# Patient Record
Sex: FEMALE | Race: WHITE | NOT HISPANIC OR LATINO | Employment: OTHER | URBAN - METROPOLITAN AREA
[De-identification: names, ages, dates, MRNs, and addresses within clinical notes are randomized per-mention and may not be internally consistent; named-entity substitution may affect disease eponyms.]

---

## 2017-03-01 ENCOUNTER — OFFICE VISIT (OUTPATIENT)
Dept: CARDIOLOGY | Facility: MEDICAL CENTER | Age: 57
End: 2017-03-01
Payer: COMMERCIAL

## 2017-03-01 VITALS
OXYGEN SATURATION: 93 % | BODY MASS INDEX: 31.07 KG/M2 | WEIGHT: 217 LBS | HEIGHT: 70 IN | HEART RATE: 72 BPM | SYSTOLIC BLOOD PRESSURE: 128 MMHG | DIASTOLIC BLOOD PRESSURE: 78 MMHG

## 2017-03-01 DIAGNOSIS — I47.10 SVT (SUPRAVENTRICULAR TACHYCARDIA): ICD-10-CM

## 2017-03-01 DIAGNOSIS — Z86.79 S/P RADIOFREQUENCY ABLATION OPERATION FOR ARRHYTHMIA: ICD-10-CM

## 2017-03-01 DIAGNOSIS — E11.9 TYPE 2 DIABETES MELLITUS WITHOUT COMPLICATION, WITHOUT LONG-TERM CURRENT USE OF INSULIN (HCC): ICD-10-CM

## 2017-03-01 DIAGNOSIS — Z98.890 S/P RADIOFREQUENCY ABLATION OPERATION FOR ARRHYTHMIA: ICD-10-CM

## 2017-03-01 DIAGNOSIS — J43.1 PANLOBULAR EMPHYSEMA (HCC): ICD-10-CM

## 2017-03-01 LAB — EKG IMPRESSION: NORMAL

## 2017-03-01 PROCEDURE — 93000 ELECTROCARDIOGRAM COMPLETE: CPT | Performed by: INTERNAL MEDICINE

## 2017-03-01 PROCEDURE — 99214 OFFICE O/P EST MOD 30 MIN: CPT | Performed by: INTERNAL MEDICINE

## 2017-03-01 NOTE — PROGRESS NOTES
"Subjective:   Lc Burns is a 56 y.o. female who presents today with h/o RFA of arrhythmia by Dr Gee for a tachycardia ( [facility] AT) in Oct. Arrhythmias have been good. Had an episode of bradycardia Monday after procedure. Event recorder put on by Dr Etienne. Minimal arrhythmias. Has had atypical chest pain since procedure. [facility] forth [facility] cardiology. Has O2 dependent COPD.    Past Medical History   Diagnosis Date   • Cancer (CMS-HCC) 1980s     Hodgkins lymphoma   • Bronchitis    • ASTHMA 2008     recurring   • Pneumonia    • Indigestion current     tx with OTC rolaids   • Arthritis current     knees, hands, elbows   • MI (myocardial infarction) (CMS-HCC)    • Sepsis (CMS-HCC) 2/25/2015   • COPD with acute exacerbation (CMS-HCC) 2/25/2015   • Persistent atrial fibrillation (CMS-HCC) 2/9/2016     History reviewed. No pertinent past surgical history.  Family History   Problem Relation Age of Onset   • Heart Disease Mother    • Other Mother 72     pacemaker   • Lung Disease Father      severe asthma   • Alcohol/Drug Maternal Grandfather    • Lung Disease Paternal Grandmother      History   Smoking status   • Former Smoker   • Quit date: 03/21/2013   Smokeless tobacco   • Not on file     Comment: quit 4 days ago, smoked less than 1ppd     Allergies   Allergen Reactions   • Cephalexin Anaphylaxis     Rxn date: 2013.   • Clindamycin Anaphylaxis     Rxn date: 2013.   • Codeine Hives, Rash and Itching     Rxn date: \"As a child.\"   • Penicillins Hives, Rash and Itching     Rxn date: \"As a child.\"     Outpatient Encounter Prescriptions as of 3/1/2017   Medication Sig Dispense Refill   • atenolol (TENORMIN) 50 MG Tab      • triamcinolone (KENALOG) 0.025 % ointment      • aspirin EC (ECOTRIN) 81 MG Tablet Delayed Response Take 81 mg by mouth every day.     • acetaminophen (TYLENOL) 500 MG Tab Take 1,000 mg by mouth every 6 hours as needed for Moderate Pain.     • metformin " "(GLUCOPHAGE) 1000 MG tablet Take 1 Tab by mouth 2 times a day, with meals. 60 Tab 0   • simvastatin (ZOCOR) 20 MG TABS Take 1 Tab by mouth every evening. 30 Tab 0   • albuterol (VENTOLIN OR PROVENTIL) 108 (90 BASE) MCG/ACT AERS inhalation aerosol Inhale 1-2 Puffs by mouth every four hours as needed for Shortness of Breath. Indications: SOB     • aspirin (ASA) 81 MG Chew Tab chewable tablet Take 81 mg by mouth every day.     • ciprofloxacin (CIPRO) 500 MG Tab 1 TAB TWICE A DAY X 14 DAYS. 28 Tab 0   • triamcinolone acetonide (KENALOG) 0.1 % Ointment        No facility-administered encounter medications on file as of 3/1/2017.     ROS     Objective:   /78 mmHg  Pulse 72  Ht 1.778 m (5' 10\")  Wt 98.431 kg (217 lb)  BMI 31.14 kg/m2  SpO2 93%  LMP 01/01/2006    Physical Exam   Constitutional: She is oriented to person, place, and time. She appears well-developed and well-nourished. No distress.   HENT:   Mouth/Throat: Oropharynx is clear and moist.   Eyes: Conjunctivae and EOM are normal.   Neck: Neck supple. No JVD present. No thyroid mass present.   Cardiovascular: Normal rate, regular rhythm, S1 normal, S2 normal and normal pulses.  PMI is not displaced.  Exam reveals no gallop.    No murmur heard.  Pulses:       Carotid pulses are 2+ on the right side, and 2+ on the left side.       Radial pulses are 2+ on the right side, and 2+ on the left side.        Femoral pulses are 2+ on the right side, and 2+ on the left side.       Dorsalis pedis pulses are 2+ on the right side, and 2+ on the left side.   No peripheral edema.   Pulmonary/Chest: Effort normal and breath sounds normal.   Abdominal: Soft. Normal appearance. She exhibits no abdominal bruit and no mass. There is no hepatosplenomegaly. There is no tenderness.   Musculoskeletal: Normal range of motion. She exhibits no edema.        Lumbar back: She exhibits no tenderness and no spasm.   Neurological: She is alert and oriented to person, place, and time. " She has normal strength.   Skin: Skin is warm and dry. No rash noted. No cyanosis. Nails show no clubbing.   Psychiatric: She has a normal mood and affect.       Assessment:     1. Panlobular emphysema (CMS-[facility])     2. SVT (supraventricular tachycardia) (CMS-[facility])     3. Type 2 diabetes mellitus without complication, without long-term current use of insulin (CMS-HCC)         Medical Decision Making:  Today's Assessment / Status / Plan:     1. Atypical chest pain since procedure. Will get old records for review including RFA. Have encouraged patient to continue to followup with [facility].  2. SVT appears improved or eliminated post RFA. Get old report.  3. Will call her post reviewing records.

## 2017-03-01 NOTE — Clinical Note
"     [facility]   [address]  [place] [address]  Phone: [phone]  Fax: [phone]              Lc Burns  1960    Encounter Date: 3/1/2017    Howard Lou M.D.          PROGRESS NOTE:  Subjective:   Lc Burns is a 56 y.o. female who presents today with h/o RFA of arrhythmia by Dr Gee for a tachycardia ( [facility] AT) in Oct. Arrhythmias have been good. Had an episode of bradycardia Monday after procedure. Event recorder put on by Dr Etienne. Minimal arrhythmias. Has had atypical chest pain since procedure. Has been going [facility] and forth [facility]. Has O2 dependent COPD.    Past Medical History   Diagnosis Date   • Cancer (CMS-HCC) 1980s     Hodgkins lymphoma   • Bronchitis    • ASTHMA 2008     recurring   • Pneumonia    • Indigestion current     tx with OTC rolaids   • Arthritis current     knees, hands, elbows   • MI (myocardial infarction) (CMS-HCC)    • Sepsis (CMS-HCC) 2/25/2015   • COPD with acute exacerbation (CMS-HCC) 2/25/2015   • Persistent atrial fibrillation (CMS-HCC) 2/9/2016     History reviewed. No pertinent past surgical history.  Family History   Problem Relation Age of Onset   • Heart Disease Mother    • Other Mother 72     pacemaker   • Lung Disease Father      severe asthma   • Alcohol/Drug Maternal Grandfather    • Lung Disease Paternal Grandmother      History   Smoking status   • Former Smoker   • Quit date: 03/21/2013   Smokeless tobacco   • Not on file     Comment: quit 4 days ago, smoked less than 1ppd     Allergies   Allergen Reactions   • Cephalexin Anaphylaxis     Rxn date: 2013.   • Clindamycin Anaphylaxis     Rxn date: 2013.   • Codeine Hives, Rash and Itching     Rxn date: \"As a child.\"   • Penicillins Hives, Rash and Itching     Rxn date: \"As a child.\"     Outpatient Encounter Prescriptions as of 3/1/2017   Medication Sig Dispense Refill   • atenolol (TENORMIN) 50 MG Tab      " "  • triamcinolone (KENALOG) 0.025 % ointment      • aspirin EC (ECOTRIN) 81 MG Tablet Delayed Response Take 81 mg by mouth every day.     • acetaminophen (TYLENOL) 500 MG Tab Take 1,000 mg by mouth every 6 hours as needed for Moderate Pain.     • metformin (GLUCOPHAGE) 1000 MG tablet Take 1 Tab by mouth 2 times a day, with meals. 60 Tab 0   • simvastatin (ZOCOR) 20 MG TABS Take 1 Tab by mouth every evening. 30 Tab 0   • albuterol (VENTOLIN OR PROVENTIL) 108 (90 BASE) MCG/ACT AERS inhalation aerosol Inhale 1-2 Puffs by mouth every four hours as needed for Shortness of Breath. Indications: SOB     • aspirin (ASA) 81 MG Chew Tab chewable tablet Take 81 mg by mouth every day.     • ciprofloxacin (CIPRO) 500 MG Tab 1 TAB TWICE A DAY X 14 DAYS. 28 Tab 0   • triamcinolone acetonide (KENALOG) 0.1 % Ointment        No facility-administered encounter medications on file as of 3/1/2017.     ROS     Objective:   /78 mmHg  Pulse 72  Ht 1.778 m (5' 10\")  Wt 98.431 kg (217 lb)  BMI 31.14 kg/m2  SpO2 93%  LMP 01/01/2006    Physical Exam   Constitutional: She is oriented to person, place, and time. She appears well-developed and well-nourished. No distress.   HENT:   Mouth/Throat: Oropharynx is clear and moist.   Eyes: Conjunctivae and EOM are normal.   Neck: Neck supple. No JVD present. No thyroid mass present.   Cardiovascular: Normal rate, regular rhythm, S1 normal, S2 normal and normal pulses.  PMI is not displaced.  Exam reveals no gallop.    No murmur heard.  Pulses:       Carotid pulses are 2+ on the right side, and 2+ on the left side.       Radial pulses are 2+ on the right side, and 2+ on the left side.        Femoral pulses are 2+ on the right side, and 2+ on the left side.       Dorsalis pedis pulses are 2+ on the right side, and 2+ on the left side.   No peripheral edema.   Pulmonary/Chest: Effort normal and breath sounds normal.   Abdominal: Soft. Normal appearance. She exhibits no abdominal bruit and no " mass. There is no hepatosplenomegaly. There is no tenderness.   Musculoskeletal: Normal range of motion. She exhibits no edema.        Lumbar back: She exhibits no tenderness and no spasm.   Neurological: She is alert and oriented to person, place, and time. She has normal strength.   Skin: Skin is warm and dry. No rash noted. No cyanosis. Nails show no clubbing.   Psychiatric: She has a normal mood and affect.       Assessment:     1. Panlobular emphysema (CMS-HCC)     2. SVT (supraventricular tachycardia) (CMS-[facility])     3. Type 2 diabetes mellitus without complication, without long-term current use of insulin (CMS-[facility])         Medical Decision Making:  Today's Assessment / Status / Plan:     1. Atypical chest pain since procedure. Will get old records for review including RFA. Have encouraged patient to continue to followup with [facility].  2. SVT appears improved or eliminated post RFA. Get old report.  3. Will call her post reviewing records.      Gaetano Guardado M.D.  [address] [facility] [address]  VIA Facsimile: [phone]     Brayden Etienne M.D.  [address] [address]  VIA Facsimile: [phone]

## 2017-03-01 NOTE — MR AVS SNAPSHOT
"        Lc Burns   3/1/2017 8:40 AM   Office Visit   MRN: [id number]    Department:  [facility]   Dept Phone:  [phone]    Description:  Female : 1960   Provider:  Howard Lou M.D.           Reason for Visit     Follow-Up           Allergies as of 3/1/2017     Allergen Noted Reactions    Cephalexin 2015   Anaphylaxis    Rxn date: .    Clindamycin 2015   Anaphylaxis    Rxn date: .    Codeine 2015   Hives, Rash, Itching    Rxn date: \"As a child.\"    Penicillins 2015   Hives, Rash, Itching    Rxn date: \"As a child.\"      You were diagnosed with     Panlobular emphysema (CMS-HCC)   [115113]       SVT (supraventricular tachycardia) (CMS-HCC)   [887585]       Type 2 diabetes mellitus without complication, without long-term current use of insulin (CMS-HCC)   [9845443]       S/P radiofrequency ablation operation for arrhythmia   [987134]         Vital Signs     Blood Pressure Pulse Height Weight Body Mass Index Oxygen Saturation    128/78 mmHg 72 1.778 m (5' 10\") 98.431 kg (217 lb) 31.14 kg/m2 93%    Last Menstrual Period Smoking Status                2006 Former Smoker          Basic Information     Date Of Birth Sex Race Ethnicity Preferred Language    1960 Female White Non- English      Problem List              ICD-10-CM Priority Class Noted - Resolved    Panlobular emphysema (CMS-HCC) J43.1   2016 - Present    Overweight E66.3   2016 - Present    Sleep apnea G47.30   2016 - Present    Type 2 diabetes mellitus without complication (CMS-HCC) E11.9   2016 - Present    Coronary artery disease involving native coronary artery I25.10   2016 - Present    Anxiety F41.9   3/21/2016 - Present    Atypical chest pain R07.89   3/21/2016 - Present    Coronary atherosclerosis of native coronary artery I25.10   3/21/2016 - Present    SVT (supraventricular tachycardia) (CMS-HCC) I47.1   2016 - Present    S/P radiofrequency ablation " operation for arrhythmia Z98.890, Z86.79   3/1/2017 - Present      Health Maintenance        Date Due Completion Dates    IMM HEP B VACCINE (1 of 3 - Primary Series) 1960 ---    DIABETES MONOFILAMENT / LE EXAM 4/2/1959 ---    RETINAL SCREENING 10/2/1976 ---    URINE ACR / MICROALBUMIN 10/2/1976 ---    IMM DTaP/Tdap/Td Vaccine (1 - Tdap) 10/2/1977 ---    IMM PNEUMOCOCCAL 19-64 (ADULT) MEDIUM RISK SERIES (1 of 1 - PPSV23) 10/2/1977 ---    PAP SMEAR 10/2/1979 ---    COLONOSCOPY 10/2/2008 ---    FASTING LIPID PROFILE 8/25/2010 8/25/2009    A1C SCREENING 8/25/2015 2/25/2015    IMM INFLUENZA (1) 9/1/2016 ---    MAMMOGRAM 12/15/2016 12/15/2015, 12/9/2015    SERUM CREATININE 3/18/2017 3/18/2016, 11/25/2015, 8/19/2015, 2/26/2015, 2/25/2015, 8/26/2009, 8/25/2009, 8/24/2009            Results       Current Immunizations     No immunizations on file.      Below and/or attached are the medications your provider expects you to take. Review all of your home medications and newly ordered medications with your provider and/or pharmacist. Follow medication instructions as directed by your provider and/or pharmacist. Please keep your medication list with you and share with your provider. Update the information when medications are discontinued, doses are changed, or new medications (including over-the-counter products) are added; and carry medication information at all times in the event of emergency situations     Allergies:  CEPHALEXIN - Anaphylaxis     CLINDAMYCIN - Anaphylaxis     CODEINE - Hives,Rash,Itching     PENICILLINS - Hives,Rash,Itching               Medications  Valid as of: March 01, 2017 -  9:18 AM    Generic Name Brand Name Tablet Size Instructions for use    Acetaminophen (Tab) TYLENOL 500 MG Take 1,000 mg by mouth every 6 hours as needed for Moderate Pain.        Albuterol Sulfate (Aero Soln) albuterol 108 (90 BASE) MCG/ACT Inhale 1-2 Puffs by mouth every four hours as needed for Shortness of Breath.  Indications: SOB        Aspirin (Tablet Delayed Response) ECOTRIN 81 MG Take 81 mg by mouth every day.        Aspirin (Chew Tab) ASA 81 MG Take 81 mg by mouth every day.        Atenolol (Tab) TENORMIN 50 MG         Ciprofloxacin HCl (Tab) CIPRO 500 MG 1 TAB TWICE A DAY X 14 DAYS.        MetFORMIN HCl (Tab) GLUCOPHAGE 1000 MG Take 1 Tab by mouth 2 times a day, with meals.        Simvastatin (Tab) ZOCOR 20 MG Take 1 Tab by mouth every evening.        Triamcinolone Acetonide (Ointment) KENALOG 0.1 %         Triamcinolone Acetonide (Ointment) KENALOG 0.025 %         .                 Medicines prescribed today were sent to:     [id number] - [place], [place] - [address] [place] [address]    Phone: [phone] Fax: [phone]    Open 24 Hours?: No      Medication refill instructions:       If your prescription bottle indicates you have medication refills left, it is not necessary to call your provider’s office. Please contact your pharmacy and they will refill your medication.    If your prescription bottle indicates you do not have any refills left, you may request refills at any time through one of the following ways: The online [employer] system (except Urgent Care), by calling your provider’s office, or by asking your pharmacy to contact your provider’s office with a refill request. Medication refills are processed only during regular business hours and may not be available until the next business day. Your provider may request additional information or to have a follow-up visit with you prior to refilling your medication.   *Please Note: Medication refills are assigned a new Rx number when refilled electronically. Your pharmacy may indicate that no refills were authorized even though a new prescription for the same medication is available at the pharmacy. Please request the medicine by name with the pharmacy before contacting your provider for a refill.           [employer] Access Code:  [email or web address]  Expires: 3/6/2017  9:29 AM    [employer]Del[employer]Mx  A secure, online tool to manage your health information     [employer]’s SinDelantal.Mx® is a secure, online tool that connects you to your personalized health information from the privacy of your home -- day or night - making it very easy for you to manage your healthcare. Once the activation process is completed, you can even access your medical information using the SinDelantal.Mx whitney, which is available for free in the [employer] store or Google Play store.     [email or web address].Mx provides the following levels of access (as shown below):   My Chart Features   [facility] Primary Care Doctor [facility]  Specialists [facility]  Urgent  Care [facility]  Primary Care  Doctor   Email your healthcare team securely and privately 24/7 X X X    Manage appointments: schedule your next appointment; view details of past/upcoming appointments X      Request prescription refills. X      View recent personal medical records, including lab and immunizations X X X X   View health record, including health history, allergies, medications X X X X   Read reports about your outpatient visits, procedures, consult and ER notes X X X X   See your discharge summary, which is a recap of your hospital and/or ER visit that includes your diagnosis, lab results, and care plan. X X       How to register for [email or web address]:  1. Go to  [email or web address].  2. Click on the Sign Up Now box, which takes you to the New Member Sign Up page. You will need to provide the following information:  a. Enter your [email or web address][employer]Mx Access Code exactly as it appears at the top of this page. (You will not need to use this code after you’ve completed the sign-up process. If you do not sign up before the expiration date, you must request a new code.)   b. Enter your date of birth.   c. Enter your home email address.   d. Click Submit, and follow the next screen’s instructions.  3. Create a SinDelantal[employer]Mx ID. This will be your [email or web address][employer]Mx login ID and cannot be  changed, so think of one that is secure and easy to remember.  4. Create a [employer] password. You can change your password at any time.  5. Enter your Password Reset Question and Answer. This can be used at a later time if you forget your password.   6. Enter your e-mail address. This allows you to receive e-mail notifications when new information is available in [employer].  7. Click Sign Up. You can now view your health information.    For assistance activating your [employer] account, call [phone]

## 2017-08-05 ENCOUNTER — HOSPITAL ENCOUNTER (OUTPATIENT)
Facility: MEDICAL CENTER | Age: 57
End: 2017-08-06
Attending: EMERGENCY MEDICINE | Admitting: INTERNAL MEDICINE
Payer: COMMERCIAL

## 2017-08-05 ENCOUNTER — APPOINTMENT (OUTPATIENT)
Dept: RADIOLOGY | Facility: MEDICAL CENTER | Age: 57
End: 2017-08-05
Attending: EMERGENCY MEDICINE
Payer: COMMERCIAL

## 2017-08-05 ENCOUNTER — RESOLUTE PROFESSIONAL BILLING HOSPITAL PROF FEE (OUTPATIENT)
Dept: HOSPITALIST | Facility: MEDICAL CENTER | Age: 57
End: 2017-08-05
Payer: COMMERCIAL

## 2017-08-05 DIAGNOSIS — R07.9 CHEST PAIN, UNSPECIFIED TYPE: ICD-10-CM

## 2017-08-05 LAB
ALBUMIN SERPL BCP-MCNC: 4.2 G/DL (ref 3.2–4.9)
ALBUMIN/GLOB SERPL: 1.3 G/DL
ALP SERPL-CCNC: 75 U/L (ref 30–99)
ALT SERPL-CCNC: 26 U/L (ref 2–50)
ANION GAP SERPL CALC-SCNC: 8 MMOL/L (ref 0–11.9)
AST SERPL-CCNC: 18 U/L (ref 12–45)
BILIRUB SERPL-MCNC: 0.5 MG/DL (ref 0.1–1.5)
BUN SERPL-MCNC: 14 MG/DL (ref 8–22)
CALCIUM SERPL-MCNC: 9.6 MG/DL (ref 8.5–10.5)
CHLORIDE SERPL-SCNC: 104 MMOL/L (ref 96–112)
CO2 SERPL-SCNC: 26 MMOL/L (ref 20–33)
CREAT SERPL-MCNC: 0.72 MG/DL (ref 0.5–1.4)
EKG IMPRESSION: NORMAL
ERYTHROCYTE [DISTWIDTH] IN BLOOD BY AUTOMATED COUNT: 40.6 FL (ref 35.9–50)
GFR SERPL CREATININE-BSD FRML MDRD: >60 ML/MIN/1.73 M 2
GLOBULIN SER CALC-MCNC: 3.3 G/DL (ref 1.9–3.5)
GLUCOSE BLD-MCNC: 149 MG/DL (ref 65–99)
GLUCOSE BLD-MCNC: 184 MG/DL (ref 65–99)
GLUCOSE SERPL-MCNC: 205 MG/DL (ref 65–99)
HCT VFR BLD AUTO: 40.5 % (ref 37–47)
HGB BLD-MCNC: 13.6 G/DL (ref 12–16)
MCH RBC QN AUTO: 30.6 PG (ref 27–33)
MCHC RBC AUTO-ENTMCNC: 33.6 G/DL (ref 33.6–35)
MCV RBC AUTO: 91.2 FL (ref 81.4–97.8)
PLATELET # BLD AUTO: 184 K/UL (ref 164–446)
PMV BLD AUTO: 10.1 FL (ref 9–12.9)
POTASSIUM SERPL-SCNC: 4 MMOL/L (ref 3.6–5.5)
PROT SERPL-MCNC: 7.5 G/DL (ref 6–8.2)
RBC # BLD AUTO: 4.44 M/UL (ref 4.2–5.4)
SODIUM SERPL-SCNC: 138 MMOL/L (ref 135–145)
TROPONIN I SERPL-MCNC: <0.01 NG/ML (ref 0–0.04)
WBC # BLD AUTO: 7.7 K/UL (ref 4.8–10.8)

## 2017-08-05 PROCEDURE — A9270 NON-COVERED ITEM OR SERVICE: HCPCS | Performed by: INTERNAL MEDICINE

## 2017-08-05 PROCEDURE — 99285 EMERGENCY DEPT VISIT HI MDM: CPT

## 2017-08-05 PROCEDURE — 93005 ELECTROCARDIOGRAM TRACING: CPT

## 2017-08-05 PROCEDURE — 36415 COLL VENOUS BLD VENIPUNCTURE: CPT

## 2017-08-05 PROCEDURE — 93005 ELECTROCARDIOGRAM TRACING: CPT | Performed by: INTERNAL MEDICINE

## 2017-08-05 PROCEDURE — 94760 N-INVAS EAR/PLS OXIMETRY 1: CPT

## 2017-08-05 PROCEDURE — 93010 ELECTROCARDIOGRAM REPORT: CPT | Performed by: INTERNAL MEDICINE

## 2017-08-05 PROCEDURE — 84484 ASSAY OF TROPONIN QUANT: CPT | Mod: 91

## 2017-08-05 PROCEDURE — G0378 HOSPITAL OBSERVATION PER HR: HCPCS

## 2017-08-05 PROCEDURE — 82962 GLUCOSE BLOOD TEST: CPT

## 2017-08-05 PROCEDURE — 99220 PR INITIAL OBSERVATION CARE,LEVL III: CPT | Performed by: INTERNAL MEDICINE

## 2017-08-05 PROCEDURE — 71010 DX-CHEST-PORTABLE (1 VIEW): CPT

## 2017-08-05 PROCEDURE — 700105 HCHG RX REV CODE 258: Performed by: INTERNAL MEDICINE

## 2017-08-05 PROCEDURE — 700102 HCHG RX REV CODE 250 W/ 637 OVERRIDE(OP): Performed by: INTERNAL MEDICINE

## 2017-08-05 PROCEDURE — 85027 COMPLETE CBC AUTOMATED: CPT

## 2017-08-05 PROCEDURE — 80053 COMPREHEN METABOLIC PANEL: CPT

## 2017-08-05 RX ORDER — ASPIRIN 81 MG/1
324 TABLET, CHEWABLE ORAL DAILY
Status: DISCONTINUED | OUTPATIENT
Start: 2017-08-05 | End: 2017-08-06 | Stop reason: HOSPADM

## 2017-08-05 RX ORDER — PROMETHAZINE HYDROCHLORIDE 25 MG/1
12.5-25 TABLET ORAL EVERY 4 HOURS PRN
Status: DISCONTINUED | OUTPATIENT
Start: 2017-08-05 | End: 2017-08-06 | Stop reason: HOSPADM

## 2017-08-05 RX ORDER — ALBUTEROL SULFATE 90 UG/1
1-2 AEROSOL, METERED RESPIRATORY (INHALATION) EVERY 4 HOURS PRN
Status: DISCONTINUED | OUTPATIENT
Start: 2017-08-05 | End: 2017-08-06 | Stop reason: HOSPADM

## 2017-08-05 RX ORDER — PROMETHAZINE HYDROCHLORIDE 12.5 MG/1
12.5-25 SUPPOSITORY RECTAL EVERY 4 HOURS PRN
Status: DISCONTINUED | OUTPATIENT
Start: 2017-08-05 | End: 2017-08-06 | Stop reason: HOSPADM

## 2017-08-05 RX ORDER — ASPIRIN 81 MG/1
81 TABLET, CHEWABLE ORAL DAILY
Status: DISCONTINUED | OUTPATIENT
Start: 2017-08-05 | End: 2017-08-05

## 2017-08-05 RX ORDER — BISACODYL 10 MG
10 SUPPOSITORY, RECTAL RECTAL
Status: DISCONTINUED | OUTPATIENT
Start: 2017-08-05 | End: 2017-08-06 | Stop reason: HOSPADM

## 2017-08-05 RX ORDER — SIMVASTATIN 20 MG
20 TABLET ORAL EVERY EVENING
Status: DISCONTINUED | OUTPATIENT
Start: 2017-08-05 | End: 2017-08-06 | Stop reason: HOSPADM

## 2017-08-05 RX ORDER — ATENOLOL 25 MG/1
25 TABLET ORAL DAILY
COMMUNITY
End: 2017-08-15

## 2017-08-05 RX ORDER — ONDANSETRON 2 MG/ML
4 INJECTION INTRAMUSCULAR; INTRAVENOUS EVERY 4 HOURS PRN
Status: DISCONTINUED | OUTPATIENT
Start: 2017-08-05 | End: 2017-08-06 | Stop reason: HOSPADM

## 2017-08-05 RX ORDER — AMOXICILLIN 250 MG
2 CAPSULE ORAL 2 TIMES DAILY
Status: DISCONTINUED | OUTPATIENT
Start: 2017-08-05 | End: 2017-08-06 | Stop reason: HOSPADM

## 2017-08-05 RX ORDER — HEPARIN SODIUM 5000 [USP'U]/ML
5000 INJECTION, SOLUTION INTRAVENOUS; SUBCUTANEOUS EVERY 8 HOURS
Status: DISCONTINUED | OUTPATIENT
Start: 2017-08-05 | End: 2017-08-06

## 2017-08-05 RX ORDER — ASPIRIN 81 MG/1
81 TABLET, CHEWABLE ORAL DAILY
Status: SHIPPED | COMMUNITY
End: 2022-01-10

## 2017-08-05 RX ORDER — SODIUM CHLORIDE 9 MG/ML
INJECTION, SOLUTION INTRAVENOUS CONTINUOUS
Status: DISCONTINUED | OUTPATIENT
Start: 2017-08-05 | End: 2017-08-06

## 2017-08-05 RX ORDER — ONDANSETRON 4 MG/1
4 TABLET, ORALLY DISINTEGRATING ORAL EVERY 4 HOURS PRN
Status: DISCONTINUED | OUTPATIENT
Start: 2017-08-05 | End: 2017-08-06 | Stop reason: HOSPADM

## 2017-08-05 RX ORDER — POLYETHYLENE GLYCOL 3350 17 G/17G
1 POWDER, FOR SOLUTION ORAL
Status: DISCONTINUED | OUTPATIENT
Start: 2017-08-05 | End: 2017-08-06 | Stop reason: HOSPADM

## 2017-08-05 RX ORDER — ACETAMINOPHEN 325 MG/1
650 TABLET ORAL EVERY 6 HOURS PRN
Status: DISCONTINUED | OUTPATIENT
Start: 2017-08-05 | End: 2017-08-06 | Stop reason: HOSPADM

## 2017-08-05 RX ORDER — ASPIRIN 300 MG/1
300 SUPPOSITORY RECTAL DAILY
Status: DISCONTINUED | OUTPATIENT
Start: 2017-08-05 | End: 2017-08-06 | Stop reason: HOSPADM

## 2017-08-05 RX ORDER — DEXTROSE MONOHYDRATE 25 G/50ML
25 INJECTION, SOLUTION INTRAVENOUS
Status: DISCONTINUED | OUTPATIENT
Start: 2017-08-05 | End: 2017-08-06 | Stop reason: HOSPADM

## 2017-08-05 RX ORDER — ATENOLOL 50 MG/1
50 TABLET ORAL
Status: DISCONTINUED | OUTPATIENT
Start: 2017-08-05 | End: 2017-08-06

## 2017-08-05 RX ORDER — ASPIRIN 325 MG
325 TABLET ORAL DAILY
Status: DISCONTINUED | OUTPATIENT
Start: 2017-08-05 | End: 2017-08-06 | Stop reason: HOSPADM

## 2017-08-05 RX ADMIN — ASPIRIN 325 MG: 325 TABLET, COATED ORAL at 17:23

## 2017-08-05 RX ADMIN — SODIUM CHLORIDE: 9 INJECTION, SOLUTION INTRAVENOUS at 17:25

## 2017-08-05 ASSESSMENT — ENCOUNTER SYMPTOMS
BACK PAIN: 0
CHILLS: 0
PALPITATIONS: 0
FEVER: 0
DIZZINESS: 0
SEIZURES: 0
EYE DISCHARGE: 0
COUGH: 0
INSOMNIA: 0
MYALGIAS: 0
FOCAL WEAKNESS: 0
NAUSEA: 0
ORTHOPNEA: 0
BLURRED VISION: 0
STRIDOR: 0
WEIGHT LOSS: 0
NECK PAIN: 0
HEARTBURN: 0
EYE PAIN: 0
DEPRESSION: 0
SPUTUM PRODUCTION: 0
SHORTNESS OF BREATH: 0
NERVOUS/ANXIOUS: 0
VOMITING: 0
ABDOMINAL PAIN: 0
HEADACHES: 0
EYE REDNESS: 0
DIARRHEA: 0

## 2017-08-05 ASSESSMENT — PATIENT HEALTH QUESTIONNAIRE - PHQ9
1. LITTLE INTEREST OR PLEASURE IN DOING THINGS: NOT AT ALL
2. FEELING DOWN, DEPRESSED, IRRITABLE, OR HOPELESS: NOT AT ALL
SUM OF ALL RESPONSES TO PHQ9 QUESTIONS 1 AND 2: 0
SUM OF ALL RESPONSES TO PHQ QUESTIONS 1-9: 0

## 2017-08-05 ASSESSMENT — LIFESTYLE VARIABLES
EVER_SMOKED: YES
ALCOHOL_USE: NO

## 2017-08-05 ASSESSMENT — PAIN SCALES - GENERAL
PAINLEVEL_OUTOF10: 3
PAINLEVEL_OUTOF10: 6
PAINLEVEL_OUTOF10: 2

## 2017-08-05 NOTE — IP AVS SNAPSHOT
Home Care Instructions                                                                                                                  Name:Lc Burns  Medical Record Number:[id number]  CSN: [id number]    YOB: 1960   Age: 56 y.o.  Sex: female  HT:1.829 m (6') WT: 97.4 kg (214 lb 11.7 oz)          Admit Date: 8/5/2017     Discharge Date:   Today's Date: 8/6/2017  Attending Doctor:  Liseth Hutchinson M.D.                  Allergies:  Cephalexin; Clindamycin; Codeine; and Penicillins            Discharge Instructions       Discharge Instructions    Discharged to home by car with relative. Discharged via walking, hospital escort: Yes.  Special equipment needed: Not Applicable    Be sure to schedule a follow-up appointment with your primary care doctor or any specialists as instructed.     Discharge Plan:   Diet Plan: Discussed  Activity Level: Discussed  Confirmed Follow up Appointment: Patient to Call and Schedule Appointment  Confirmed Symptoms Management: Discussed  Medication Reconciliation Updated: Yes  Influenza Vaccine Indication: Patient Refuses    I understand that a diet low in cholesterol, fat, and sodium is recommended for good health. Unless I have been given specific instructions below for another diet, I accept this instruction as my diet prescription.   Other diet:     Special Instructions: None    · Is patient discharged on Warfarin / Coumadin?   No     · Is patient Post Blood Transfusion?  No    Depression / Suicide Risk    As you are discharged from this [facility] facility, it is important to learn how to keep safe from harming yourself.    Recognize the warning signs:  · Abrupt changes in personality, positive or negative- including increase in energy   · Giving away possessions  · Change in eating patterns- significant weight changes-  positive or negative  · Change in sleeping patterns- unable to sleep or sleeping all the time   · Unwillingness or inability to  communicate  · Depression  · Unusual sadness, discouragement and loneliness  · Talk of wanting to die  · Neglect of personal appearance   · Rebelliousness- reckless behavior  · Withdrawal from people/activities they love  · Confusion- inability to concentrate     If you or a loved one observes any of these behaviors or has concerns about self-harm, here's what you can do:  · Talk about it- your feelings and reasons for harming yourself  · Remove any means that you might use to hurt yourself (examples: pills, rope, extension cords, firearm)  · Get professional help from the community (Mental Health, Substance Abuse, psychological counseling)  · Do not be alone:Call your Safe Contact- someone whom you trust who will be there for you.  · Call your local CRISIS HOTLINE 964-7306 or 677-491-4100  · Call your local Children's Mobile Crisis Response Team [place] (269) 495-8110 or www.Hinge  · Call the toll free National Suicide Prevention Hotlines   · National Suicide Prevention Lifeline 854-145-JTSO (6784)  · [employer] Line Network 800-SUICIDE (025-2701)        Your appointments     Oct 11, 2017  8:40 AM   PREVIOUS PATIENT with Heriberto Caceres M.D.   [facility] (--)    [address] [place] [address] [address]  [place] [address] [phone]                 Discharge Medication Instructions:    Below are the medications your physician expects you to take upon discharge:    Review all your home medications and newly ordered medications with your doctor and/or pharmacist. Follow medication instructions as directed by your doctor and/or pharmacist.    Please keep your medication list with you and share with your physician.               Medication List      CONTINUE taking these medications        Instructions    Morning Afternoon Evening Bedtime    acetaminophen 500 MG Tabs   Commonly known as:  TYLENOL        Take 1,000 mg by mouth 1 time daily as needed for Mild Pain or Moderate  Pain.   Dose:  1000 mg                        albuterol 108 (90 BASE) MCG/ACT Aers inhalation aerosol        Inhale 1-2 Puffs by mouth every four hours as needed for Shortness of Breath. Indications: SOB   Dose:  1-2 Puff                        aspirin 81 MG Chew chewable tablet   Commonly known as:  ASA        Take 81 mg by mouth every day.   Dose:  81 mg                        atenolol 25 MG Tabs   Commonly known as:  TENORMIN        Take 25 mg by mouth every day.   Dose:  25 mg                        metformin 500 MG Tabs   Commonly known as:  GLUCOPHAGE        Take 500 mg by mouth 2 times a day, with meals.   Dose:  500 mg                        simvastatin 20 MG Tabs   Commonly known as:  ZOCOR        Take 1 Tab by mouth every evening.   Dose:  20 mg                                Instructions           Diet / Nutrition:    Follow any diet instructions given to you by your doctor or the dietician, including how much salt (sodium) you are allowed each day.    If you are overweight, talk to your doctor about a weight reduction plan.    Activity:    Remain physically active following your doctor's instructions about exercise and activity.    Rest often.     Any time you become even a little tired or short of breath, SIT DOWN and rest.    Worsening Symptoms:    Report any of the following signs and symptoms to the doctor's office immediately:    *Pain of jaw, arm, or neck  *Chest pain not relieved by medication                               *Dizziness or loss of consciousness  *Difficulty breathing even when at rest   *More tired than usual                                       *Bleeding drainage or swelling of surgical site  *Swelling of feet, ankles, legs or stomach                 *Fever (>100ºF)  *Pink or blood tinged sputum  *Weight gain (3lbs/day or 5lbs /week)           *Shock from internal defibrillator (if applicable)  *Palpitations or irregular heartbeats                *Cool and/or numb  extremities    Stroke Awareness    Common Risk Factors for Stroke include:    Age  Atrial Fibrillation  Carotid Artery Stenosis  Diabetes Mellitus  Excessive alcohol consumption  High blood pressure  Overweight   Physical inactivity  Smoking    Warning signs and symptoms of a stroke include:    *Sudden numbness or weakness of the face, arm or leg (especially on one side of the body).  *Sudden confusion, trouble speaking or understanding.  *Sudden trouble seeing in one or both eyes.  *Sudden trouble walking, dizziness, loss of balance or coordination.Sudden severe headache with no known cause.    It is very important to get treatment quickly when a stroke occurs. If you experience any of the above warning signs, call 911 immediately.                   Disclaimer         Quit Smoking / Tobacco Use:    I understand the use of any tobacco products increases my chance of suffering from future heart disease or stroke and could cause other illnesses which may shorten my life. Quitting the use of tobacco products is the single most important thing I can do to improve my health. For further information on smoking / tobacco cessation call a Toll Free Quit Line at 1-640.717.2663 (*National Cancer [place]) or 1-848.317.2029 (American Lung Association) or you can access the web based program at www.lungusa.org.    [place] Tobacco Users Help Line:  (221) 918-5808       Toll Free: 1-522.155.3721  Quit Tobacco Program [facility] (278)620-1398    Crisis Hotline:    [place] Crisis Hotline:  4-253-SZEZMKD or 1-890.526.8074    [place] Crisis Hotline:    1-403.752.3162 or 120-392-0325    Discharge Survey:   Thank you for choosing [facility]. We hope we did everything we could to make your hospital stay a pleasant one. You may be receiving a phone survey and we would appreciate your time and participation in answering the questions. Your input is very valuable to us in our efforts to improve our service to our  patients and their families.        My signature on this form indicates that:    1. I have reviewed and understand the above information.  2. My questions regarding this information have been answered to my satisfaction.  3. I have formulated a plan with my discharge nurse to obtain my prescribed medications for home.                  Disclaimer         __________________________________                     __________       ________                       Patient Signature                                                 Date                    Time

## 2017-08-05 NOTE — ED NOTES
Verbalizes understanding of POC. PIV established, blood sent to lab. VSS, call light within reach.

## 2017-08-05 NOTE — ED NOTES
Pt to triage after EKG.  Chief Complaint   Patient presents with   • Chest Pain     across front of chest, described as tightness, onset yesterday afternoon while cleaning   • Numbness     left arm, tingling   • Jaw Pain   • Shortness of Breath     Pt asked to wait in lobby. Advised of wait time and triage process. Advised to alert RN w/ any changes in condition. Thanked for patience.

## 2017-08-05 NOTE — H&P
Hospital Medicine History and Physical      Date of Service  8/5/2017    Chief Complaint  Chief Complaint   Patient presents with   • Chest Pain     across front of chest, described as tightness, onset yesterday afternoon while cleaning   • Numbness     left arm, tingling   • Jaw Pain   • Shortness of Breath       History of Presenting Illness  Grant is a 56 y.o. female w/h/o atrial fibrillation post ablation, questionable coronary artery disease who presents with chest pain. Per patient she has been having chest pain over the past 2 years. Intermittent in nature. But this time is more persistent starting yesterday. She described the pain as tightness over her chest 5 out of 10 intensity, radiated to her left arm and jaw area. Denied palpitation, shortness of breath or any neurological deficit. The patient see Dr. Green [facility].    Primary Care Physician  Gaetano Guardado M.D.        Code Status  Full code per patient    Review of Systems  Review of Systems   Constitutional: Negative for fever, chills and weight loss.   HENT: Negative for congestion and nosebleeds.    Eyes: Negative for blurred vision, pain, discharge and redness.   Respiratory: Negative for cough, sputum production, shortness of breath and stridor.    Cardiovascular: Positive for chest pain. Negative for palpitations and orthopnea.   Gastrointestinal: Negative for heartburn, nausea, vomiting, abdominal pain and diarrhea.   Genitourinary: Negative for dysuria, urgency and frequency.   Musculoskeletal: Negative for myalgias, back pain and neck pain.   Skin: Negative for itching and rash.   Neurological: Negative for dizziness, focal weakness, seizures and headaches.   Psychiatric/Behavioral: Negative for depression. The patient is not nervous/anxious and does not have insomnia.      Please see HPI, all other systems were reviewed and are negative (AMA/CMS criteria)     Past Medical History  Past Medical History   Diagnosis Date   • Cancer  "(CMS-HCC) 1980s     Hodgkins lymphoma   • Bronchitis    • ASTHMA 2008     recurring   • Pneumonia    • Indigestion current     tx with OTC rolaids   • Arthritis current     knees, hands, elbows   • MI (myocardial infarction) (CMS-HCC)    • Sepsis (CMS-HCC) 2015   • COPD with acute exacerbation (CMS-HCC) 2015   • Persistent atrial fibrillation (CMS-HCC) 2016       Surgical History  No past surgical history on file.    Medications  No current facility-administered medications on file prior to encounter.     Current Outpatient Prescriptions on File Prior to Encounter   Medication Sig Dispense Refill   • acetaminophen (TYLENOL) 500 MG Tab Take 1,000 mg by mouth 1 time daily as needed for Mild Pain or Moderate Pain.     • simvastatin (ZOCOR) 20 MG TABS Take 1 Tab by mouth every evening. 30 Tab 0   • albuterol (VENTOLIN OR PROVENTIL) 108 (90 BASE) MCG/ACT AERS inhalation aerosol Inhale 1-2 Puffs by mouth every four hours as needed for Shortness of Breath. Indications: SOB       Family History  Family History   Problem Relation Age of Onset   • Heart Disease Mother    • Other Mother 72     pacemaker   • Lung Disease Father      severe asthma   • Alcohol/Drug Maternal Grandfather    • Lung Disease Paternal Grandmother          Social History  Social History   Substance Use Topics   • Smoking status: Former Smoker     Quit date: 2013   • Smokeless tobacco: Not on file      Comment: quit 4 days ago, smoked less than 1ppd   • Alcohol Use: Yes       Allergies  Allergies   Allergen Reactions   • Cephalexin Anaphylaxis     Rxn date: .   • Clindamycin Anaphylaxis     Rxn date: .   • Codeine Hives, Rash and Itching     Rxn date: \"As a child.\"   • Penicillins Hives, Rash and Itching     Rxn date: \"As a child.\"        Physical Exam  Laboratory   Hemodynamics  Temp (24hrs), Av.2 °C (97.1 °F), Min:35.3 °C (95.6 °F), Max:37 °C (98.6 °F)   Temperature: 37 °C (98.6 °F)  Pulse  Av.4  Min: 61  Max: 77 " Heart Rate (Monitored): 61  Blood Pressure: 136/70 mmHg, NIBP: 127/68 mmHg      Respiratory      Respiration: 20, Pulse Oximetry: 98 %             Physical Exam   Constitutional: She is oriented to person, place, and time. No distress.   HENT:   Head: Normocephalic and atraumatic.   Mouth/Throat: Oropharynx is clear and moist.   Eyes: Conjunctivae and EOM are normal. Pupils are equal, round, and reactive to light.   Neck: Normal range of motion. Neck supple. No tracheal deviation present. No thyromegaly present.   Cardiovascular: Normal rate and regular rhythm.    No murmur heard.  Pulmonary/Chest: Effort normal and breath sounds normal. No respiratory distress. She has no wheezes.   Abdominal: Soft. Bowel sounds are normal. She exhibits no distension. There is no tenderness.   Musculoskeletal: She exhibits no edema or tenderness.   Neurological: She is alert and oriented to person, place, and time. No cranial nerve deficit.   Skin: Skin is warm and dry. She is not diaphoretic. No erythema.   Psychiatric: She has a normal mood and affect. Her behavior is normal. Thought content normal.       Recent Labs      08/05/17   1237   WBC  7.7   RBC  4.44   HEMOGLOBIN  13.6   HEMATOCRIT  40.5   MCV  91.2   MCH  30.6   MCHC  33.6   RDW  40.6   PLATELETCT  184   MPV  10.1     Recent Labs      08/05/17   1237   SODIUM  138   POTASSIUM  4.0   CHLORIDE  104   CO2  26   GLUCOSE  205*   BUN  14   CREATININE  0.72   CALCIUM  9.6     Recent Labs      08/05/17   1237   ALTSGPT  26   ASTSGOT  18   ALKPHOSPHAT  75   TBILIRUBIN  0.5   GLUCOSE  205*                 Lab Results   Component Value Date    TROPONINI <0.01 08/05/2017       Imaging  DX-CHEST-PORTABLE (1 VIEW)   Final Result      No acute cardiopulmonary findings.        EKG  per my independant read:  QTc: 434, HR: 64, Normal Sinus Rhythm, no ST/T changes     Assessment/Plan         Chest pain (present on admission)  Assessment & Plan  Concerning fall acute coronary  syndrome  Initial troponin EKG were negative  We'll continue to trend the troponin EKG  Nothing by mouth at midnight  With get exercise stress test for her tomorrow  Do not give atenolol for pending stress test    Type 2 diabetes mellitus without complication (CMS-HCC) (present on admission)  Assessment & Plan  On insulin management  Hypoglycemia protocol        Prophylaxis:  sc heparin         This dictation was created using voice recognition software. The accuracy of the dictation is limited to the abilities of the software. I expect there may be some errors of grammar and possibly content.

## 2017-08-05 NOTE — DISCHARGE PLANNING
Care Transition Team Assessment    Information Source  Orientation : Oriented x 4  Information Given By: Patient  Who is responsible for making decisions for patient? : Patient    Readmission Evaluation  Is this a readmission?: No    Elopement Risk  Legal Hold: No  Elopement Risk: Not at Risk for Elopement    Interdisciplinary Discharge Planning  Does Admitting Nurse Feel This Could be a Complex Discharge?: No  Primary Care Physician: Geo  Lives with - Patient's Self Care Capacity: Spouse  Support Systems: Family Member(s)  Housing / Facility: [address]  Do You Take your Prescribed Medications Regularly: Yes  Able to Return to Previous ADL's: Yes  Mobility Issues: No  Prior Services: Home-Independent  Patient Expects to be Discharged to:: home  Assistance Needed: Unknown at this Time  Durable Medical Equipment: Home Oxygen  DME Provider / Phone: Cal    Discharge Preparedness  What is your plan after discharge?: Uncertain - pending medical team collaboration  What are your discharge supports?: Spouse  Prior Functional Level: Ambulatory  Difficulity with ADLs: None  Difficulity with IADLs: None    Functional Assesment  Prior Functional Level: Ambulatory    Finances  Financial Barriers to Discharge: No  Prescription Coverage: No  Prescription Coverage Comments: Patient may need assit with meds    Vision / Hearing Impairment  Vision Impairment : No  Hearing Impairment : No    Values / Beliefs / Concerns  Values / Beliefs Concerns : No    Advance Directive  Advance Directive?: None  Advance Directive offered?: AD Booklet refused    Domestic Abuse  Have you ever been the victim of abuse or violence?: No  Physical Abuse or Sexual Abuse: No  Verbal Abuse or Emotional Abuse: No  Possible Abuse Reported to:: Not Applicable    Psychological Assessment  History of Substance Abuse: Amphetamines  Date Last Used - Amphetamines: 8 years ago         Anticipated Discharge Information  Anticipated discharge disposition:  Home  Discharge Address: face sheet

## 2017-08-05 NOTE — PROGRESS NOTES
Patient up on the floor, Sinus Rhythm on cardiac monitor. Chest pain 3 / 10. Concern about her BP. Discuss to patient about plan of care.

## 2017-08-05 NOTE — ASSESSMENT & PLAN NOTE
Concerning fall acute coronary syndrome  Initial troponin EKG were negative  We'll continue to trend the troponin EKG  Nothing by mouth at midnight  With get exercise stress test for her tomorrow  Do not give atenolol for pending stress test

## 2017-08-05 NOTE — ED PROVIDER NOTES
ED Provider Note    CHIEF COMPLAINT  Chief Complaint   Patient presents with   • Chest Pain     across front of chest, described as tightness, onset yesterday afternoon while cleaning   • Numbness     left arm, tingling   • Jaw Pain   • Shortness of Breath       HPI  Lc Burns is a 56 y.o. female who presents for evaluation of chest pain. The patient states she has developed pain across the front of her chest that started yesterday afternoon while cleaning. She states she has some numbness to left arm some jaw pain, shortness of breath. She's had a history of number of visits to emergency departments for chest pain. She's also had an ablation for dysrhythmia. She is very anxious. She is on chronic oxygen and she's had left lower lobectomy for valley fever. She reportedly 7 angiogram which showed 50 per cent blockage of one of her arteries. She denies any pleuritic pain she denies any nausea vomiting. Nothing specifically makes her symptoms worse or better,    REVIEW OF SYSTEMS  See HPI for further details. All other systems reviewed and negative except as noted above    PAST MEDICAL HISTORY  Past Medical History   Diagnosis Date   • Cancer (CMS-HCC) 1980s     Hodgkins lymphoma   • Bronchitis    • ASTHMA 2008     recurring   • Pneumonia    • Indigestion current     tx with OTC rolaids   • Arthritis current     knees, hands, elbows   • MI (myocardial infarction) (CMS-HCC)    • Sepsis (CMS-HCC) 2/25/2015   • COPD with acute exacerbation (CMS-HCC) 2/25/2015   • Persistent atrial fibrillation (CMS-HCC) 2/9/2016       FAMILY HISTORY  Family History   Problem Relation Age of Onset   • Heart Disease Mother    • Other Mother 72     pacemaker   • Lung Disease Father      severe asthma   • Alcohol/Drug Maternal Grandfather    • Lung Disease Paternal Grandmother        SOCIAL HISTORY  Social History     Social History   • Marital Status:      Spouse Name: N/A   • Number of Children: N/A   • Years of Education: N/A  "    Social History Main Topics   • Smoking status: Former Smoker     Quit date: 03/21/2013   • Smokeless tobacco: Not on file      Comment: quit 4 days ago, smoked less than 1ppd   • Alcohol Use: Yes   • Drug Use: Yes      Comment: former cocaine 'lots of it'   • Sexual Activity: Not on file     Other Topics Concern   • Not on file     Social History Narrative       SURGICAL HISTORY  No past surgical history on file.    CURRENT MEDICATIONS  Home Medications     **Home medications have not yet been reviewed for this encounter**           ALLERGIES  Allergies   Allergen Reactions   • Cephalexin Anaphylaxis     Rxn date: 2013.   • Clindamycin Anaphylaxis     Rxn date: 2013.   • Codeine Hives, Rash and Itching     Rxn date: \"As a child.\"   • Penicillins Hives, Rash and Itching     Rxn date: \"As a child.\"       PHYSICAL EXAM  VITAL SIGNS: /70 mmHg  Pulse 66  Temp(Src) 37 °C (98.6 °F)  Resp 20  Ht 1.829 m (6' 0.01\")  Wt 96.163 kg (212 lb)  BMI 28.75 kg/m2  SpO2 96%  LMP 01/01/2006  Constitutional :  Well developed, Well nourished, No acute distress, Non-toxic appearance.   HENT: Head is atraumatic, moist mucous membranes she has on supplemental oxygen  Eyes: Normal-appearing nonicteric  Neck: Normal range of motion, No tenderness, Supple, No stridor.   Lymphatic: No cervical adenopathy.   Cardiovascular: Normal heart rate, Normal rhythm, No murmurs, No rubs, No gallops.   Thorax & Lungs: Equal breath sounds bilaterally no rales rhonchi  Skin: Warm, Dry, No erythema, No rash.   Abdomen is soft nontender no distention no masses  Extremities no cyanosis or edema  Neurological she is awake alert without focal findings  Psychiatric she is anxious no impairment of judgment is noted    DX-CHEST-PORTABLE (1 VIEW)   Final Result      No acute cardiopulmonary findings.        Results for orders placed or performed during the hospital encounter of 08/05/17   CBC without Differential   Result Value Ref Range    WBC 7.7 " 4.8 - 10.8 K/uL    RBC 4.44 4.20 - 5.40 M/uL    Hemoglobin 13.6 12.0 - 16.0 g/dL    Hematocrit 40.5 37.0 - 47.0 %    MCV 91.2 81.4 - 97.8 fL    MCH 30.6 27.0 - 33.0 pg    MCHC 33.6 33.6 - 35.0 g/dL    RDW 40.6 35.9 - 50.0 fL    Platelet Count 184 164 - 446 K/uL    MPV 10.1 9.0 - 12.9 fL   Complete Metabolic Panel (CMP)   Result Value Ref Range    Sodium 138 135 - 145 mmol/L    Potassium 4.0 3.6 - 5.5 mmol/L    Chloride 104 96 - 112 mmol/L    Co2 26 20 - 33 mmol/L    Anion Gap 8.0 0.0 - 11.9    Glucose 205 (H) 65 - 99 mg/dL    Bun 14 8 - 22 mg/dL    Creatinine 0.72 0.50 - 1.40 mg/dL    Calcium 9.6 8.5 - 10.5 mg/dL    AST(SGOT) 18 12 - 45 U/L    ALT(SGPT) 26 2 - 50 U/L    Alkaline Phosphatase 75 30 - 99 U/L    Total Bilirubin 0.5 0.1 - 1.5 mg/dL    Albumin 4.2 3.2 - 4.9 g/dL    Total Protein 7.5 6.0 - 8.2 g/dL    Globulin 3.3 1.9 - 3.5 g/dL    A-G Ratio 1.3 g/dL   Troponin STAT   Result Value Ref Range    Troponin I <0.01 0.00 - 0.04 ng/mL   ESTIMATED GFR   Result Value Ref Range    GFR If African American >60 >60 mL/min/1.73 m 2    GFR If Non African American >60 >60 mL/min/1.73 m 2   EKG NOW   Result Value Ref Range    Report       [facility] Emergency Dept.    Test Date:  2017  Pt Name:    KATHY NGUYỄN              Department: ER  MRN:        [id number]                      Room:  Gender:     F                            Technician: [id number]  :        [id number]                   Requested By:ER TRIAGE PROTOCOL  Order #:    [id number]                    Reading MD:    Measurements  Intervals                                Axis  Rate:       64                           P:          77  NY:         196                          QRS:        74  QRSD:       78                           T:          77  QT:         420  QTc:        434    Interpretive Statements  SINUS RHYTHM  Compared to ECG 2017 09:09:48  First degree AV block no longer present         EKG Interpretation    Interpreted by  me    Rhythm: normal sinus   Rate: normal  Axis: normal  Ectopy: none  Conduction: normal  ST Segments: no acute change  T Waves: no acute change  Q Waves: none    Clinical Impression: no acute changes and normal EKG, compared to 3/1/2017 1st degree AV block no longer present      COURSE & MEDICAL DECISION MAKING  Pertinent Labs & Imaging studies reviewed. (See chart for details)  Patient is presenting with chest pain. The patient does have a lot of anxiety regarding her underlying cardiac disorder but given description of chest pressure radiating to the arm and jaw I think she should be admitted to rule out acute coronary syndrome. She has no findings consistent with dissection, pulmonary embolus. She will be admitted to the hospital staff who I discussed the patient with. I suspect her pain may be secondary to an atypical etiology and anxiety    FINAL IMPRESSION  1. Chest pain etiology unclear  2.   3.      Electronically signed by: Nahun Escoto, 8/5/2017

## 2017-08-06 ENCOUNTER — APPOINTMENT (OUTPATIENT)
Dept: RADIOLOGY | Facility: MEDICAL CENTER | Age: 57
End: 2017-08-06
Attending: INTERNAL MEDICINE
Payer: COMMERCIAL

## 2017-08-06 VITALS
DIASTOLIC BLOOD PRESSURE: 74 MMHG | SYSTOLIC BLOOD PRESSURE: 139 MMHG | HEIGHT: 72 IN | HEART RATE: 80 BPM | RESPIRATION RATE: 16 BRPM | TEMPERATURE: 98.1 F | OXYGEN SATURATION: 95 % | BODY MASS INDEX: 29.08 KG/M2 | WEIGHT: 214.73 LBS

## 2017-08-06 PROBLEM — R07.9 CHEST PAIN: Status: RESOLVED | Noted: 2017-08-05 | Resolved: 2017-08-06

## 2017-08-06 LAB
EKG IMPRESSION: NORMAL
GLUCOSE BLD-MCNC: 199 MG/DL (ref 65–99)

## 2017-08-06 PROCEDURE — G0378 HOSPITAL OBSERVATION PER HR: HCPCS

## 2017-08-06 PROCEDURE — 82962 GLUCOSE BLOOD TEST: CPT

## 2017-08-06 PROCEDURE — 93017 CV STRESS TEST TRACING ONLY: CPT

## 2017-08-06 ASSESSMENT — PAIN SCALES - GENERAL
PAINLEVEL_OUTOF10: 2
PAINLEVEL_OUTOF10: 2

## 2017-08-06 ASSESSMENT — PATIENT HEALTH QUESTIONNAIRE - PHQ9
SUM OF ALL RESPONSES TO PHQ QUESTIONS 1-9: 0
SUM OF ALL RESPONSES TO PHQ9 QUESTIONS 1 AND 2: 0
1. LITTLE INTEREST OR PLEASURE IN DOING THINGS: NOT AT ALL
2. FEELING DOWN, DEPRESSED, IRRITABLE, OR HOPELESS: NOT AT ALL

## 2017-08-06 ASSESSMENT — LIFESTYLE VARIABLES: REASON UNABLE TO ASSESS: N

## 2017-08-06 NOTE — DISCHARGE INSTRUCTIONS
Discharge Instructions    Discharged to home by car with relative. Discharged via walking, hospital escort: Yes.  Special equipment needed: Not Applicable    Be sure to schedule a follow-up appointment with your primary care doctor or any specialists as instructed.     Discharge Plan:   Diet Plan: Discussed  Activity Level: Discussed  Confirmed Follow up Appointment: Patient to Call and Schedule Appointment  Confirmed Symptoms Management: Discussed  Medication Reconciliation Updated: Yes  Influenza Vaccine Indication: Patient Refuses    I understand that a diet low in cholesterol, fat, and sodium is recommended for good health. Unless I have been given specific instructions below for another diet, I accept this instruction as my diet prescription.   Other diet:     Special Instructions: None    · Is patient discharged on Warfarin / Coumadin?   No     · Is patient Post Blood Transfusion?  No    Depression / Suicide Risk    As you are discharged from this [facility] facility, it is important to learn how to keep safe from harming yourself.    Recognize the warning signs:  · Abrupt changes in personality, positive or negative- including increase in energy   · Giving away possessions  · Change in eating patterns- significant weight changes-  positive or negative  · Change in sleeping patterns- unable to sleep or sleeping all the time   · Unwillingness or inability to communicate  · Depression  · Unusual sadness, discouragement and loneliness  · Talk of wanting to die  · Neglect of personal appearance   · Rebelliousness- reckless behavior  · Withdrawal from people/activities they love  · Confusion- inability to concentrate     If you or a loved one observes any of these behaviors or has concerns about self-harm, here's what you can do:  · Talk about it- your feelings and reasons for harming yourself  · Remove any means that you might use to hurt yourself (examples: pills, rope, extension cords, firearm)  · Get  professional help from the community (Mental Health, Substance Abuse, psychological counseling)  · Do not be alone:Call your Safe Contact- someone whom you trust who will be there for you.  · Call your local CRISIS HOTLINE 815-9021 or 967-878-0769  · Call your local Children's Mobile Crisis Response Team [place] (559) 930-8576 or www.[employer]  · Call the toll free National Suicide Prevention Hotlines   · National Suicide Prevention Lifeline 899-188-FWID (5551)  · National Hope Line Network 800-SUICIDE (417-1754)

## 2017-08-06 NOTE — PROGRESS NOTES
Pt aao x 4. Ricardo. C/o chest discomfort upon assessment, but denies pain medications. Up self. Pt on tele monitor. Poc discussed with pt. Call light within reach. Instructed pt to use call light for assistance. Hourly rounding in use

## 2017-08-06 NOTE — PROGRESS NOTES
MD visited pt,for discharge, IV D/C'd. Discharge instructions provided to pt. Pt states understanding. Pt states all questions have been answered. Copy of discharge provided to pt. Signed copy in chart. Pt states that all personal belongings are in possession. Pt escorted off unit without incident.

## 2017-08-06 NOTE — PROGRESS NOTES
MD visited awaiting for stress test report,NM called,MD cardiologist reminded,pt wants to go home,will call MD when report is ready.

## 2017-08-06 NOTE — PROGRESS NOTES
Pt in bed a&ox4,tele with first degree AVB,no c/o chest pain,poc explained,pt awaiting for stress test.

## 2017-08-06 NOTE — DISCHARGE SUMMARY
[facility] DISCHARGE SUMMARY AND PROGRESS NOTE     PATIENT SUMMARY     Admit Date:  8/5/2017       Discharge Date: 8/06/2017    Service:   [facility]  Attending Physician(s):   Dr. Liseth Hutchinson       Senior Resident(s): Dr. Hong      Primary Diagnosis:   Chest pain    Secondary Diagnoses:                COPD on chronic O2    HPI:       Grant is a 56 y.o. female w/h/o atrial fibrillation post ablation, questionable coronary artery disease who presented with chest pain. Per patient she has been having chest pain over the past 2 years. Intermittent in nature. But this time is more persistent starting yesterday. She described the pain as tightness over her chest 5 out of 10 intensity, radiated to her left arm and jaw area. Denied palpitation, shortness of breath or any neurological deficit. The patient see Dr. Green from [facility].     Hospital Summary:        Patient was improved after ER course but stayed for ACS rule out. She had a treadmill stress test early in the morning on day of discharge, which she tolerated well. She denied new concerns and requested discharge as soon as results were back. Patient had stable vital signs, was ambulating and had stable vital signs on her home O2 of 3L prior to discharge.    Consultants:      None    Procedures:        None    Imaging/ Testing:      EKG with sinus rhythm, First degree AV block    Troponin neg x3    Treadmill stress test preliminary results were faxed over, no evidence of ischemia, some non-specific changes present    Discharge Medications:        (X)  Medication Reconciliation Completed     Medication List      CONTINUE taking these medications       Instructions    acetaminophen 500 MG Tabs   Commonly known as:  TYLENOL    Take 1,000 mg by mouth 1 time daily as needed for Mild Pain or Moderate Pain.   Dose:  1000 mg       albuterol 108 (90 BASE) MCG/ACT Aers inhalation aerosol    Inhale 1-2 Puffs by mouth every four hours as needed  for Shortness of Breath. Indications: SOB   Dose:  1-2 Puff       aspirin 81 MG Chew chewable tablet   Commonly known as:  ASA    Take 81 mg by mouth every day.   Dose:  81 mg       atenolol 25 MG Tabs   Commonly known as:  TENORMIN    Take 25 mg by mouth every day.   Dose:  25 mg       metformin 500 MG Tabs   Commonly known as:  GLUCOPHAGE    Take 500 mg by mouth 2 times a day, with meals.   Dose:  500 mg       simvastatin 20 MG Tabs   Commonly known as:  ZOCOR    Take 1 Tab by mouth every evening.   Dose:  20 mg             Disposition: Stable    Diet: Cardiac    Activity: As tolerated    Instructions:       The patient was instructed to return to the ER in the event of worsening symptoms. I have counseled the patient on the importance of compliance and the patient has agreed to proceed with all medical recommendations and follow up plan indicated above.   The patient understands that all medications come with benefits and risks. Risks may include permanent injury or death and these risks can be minimized with close reassessment and monitoring.        Primary Care Provider:   Dr. Gaetano Guardado  Cardiologist: Dr. Gee    Follow up appointment details :      Follow up with Dr. Guardado within 2 weeks  Follow up with Dr. Gee at soonest available visit    Pending Studies:        Treadmill stress test complete results    #################################################    Interval history/exam for day of discharge:     Patient has no chest pain, no edema, no cough, no fever, no new complaints. She was eating breakfast at time of exam and requests discharge.    Filed Vitals:    08/06/17 0000 08/06/17 0353 08/06/17 0805 08/06/17 1124   BP: 123/55 141/66 130/74 139/74   Pulse: 69 71 79 80   Temp: 36.3 °C (97.3 °F) 36.4 °C (97.5 °F) 36.7 °C (98 °F) 36.7 °C (98.1 °F)   Resp: 14 14 19 16   Height:       Weight:       SpO2: 97% 96% 94% 95%     Weight/BMI: Body mass index is 29.12 kg/(m^2).  Pulse Oximetry: 95 %, O2  (LPM): 3, O2 Delivery: Nasal Cannula    General: well-appearing, obese, seated in chair eating breakfast with O2 in place, pleasant and talkative  CVS: RRR, no extra heart sounds appreciated  PULM: posterior fields with no crackles or wheezing, moving air well  Ext: no edema, clubbing, or cyanosis    Most Recent Labs:    Lab Results   Component Value Date/Time    WBC 7.7 08/05/2017 12:37 PM    RBC 4.44 08/05/2017 12:37 PM    HEMOGLOBIN 13.6 08/05/2017 12:37 PM    HEMATOCRIT 40.5 08/05/2017 12:37 PM    MCV 91.2 08/05/2017 12:37 PM    MCH 30.6 08/05/2017 12:37 PM    MCHC 33.6 08/05/2017 12:37 PM    MPV 10.1 08/05/2017 12:37 PM    NEUTROPHILS-POLYS 59.80 03/18/2016 02:36 AM    LYMPHOCYTES 32.00 03/18/2016 02:36 AM    MONOCYTES 4.70 03/18/2016 02:36 AM    EOSINOPHILS 2.20 03/18/2016 02:36 AM    BASOPHILS 1.10 03/18/2016 02:36 AM      Lab Results   Component Value Date/Time    SODIUM 138 08/05/2017 12:37 PM    POTASSIUM 4.0 08/05/2017 12:37 PM    CHLORIDE 104 08/05/2017 12:37 PM    CO2 26 08/05/2017 12:37 PM    GLUCOSE 205* 08/05/2017 12:37 PM    BUN 14 08/05/2017 12:37 PM    CREATININE 0.72 08/05/2017 12:37 PM      Lab Results   Component Value Date/Time    ALT(SGPT) 26 08/05/2017 12:37 PM    AST(SGOT) 18 08/05/2017 12:37 PM    ALKALINE PHOSPHATASE 75 08/05/2017 12:37 PM    TOTAL BILIRUBIN 0.5 08/05/2017 12:37 PM    LIPASE 18 08/19/2015 05:38 PM    ALBUMIN 4.2 08/05/2017 12:37 PM    GLOBULIN 3.3 08/05/2017 12:37 PM    INR 0.88 03/18/2016 02:36 AM     Lab Results   Component Value Date/Time    PT 12.0 03/18/2016 02:36 AM    INR 0.88 03/18/2016 02:36 AM

## 2017-08-15 ENCOUNTER — OFFICE VISIT (OUTPATIENT)
Dept: CARDIOLOGY | Facility: MEDICAL CENTER | Age: 57
End: 2017-08-15
Payer: COMMERCIAL

## 2017-08-15 VITALS
HEART RATE: 71 BPM | SYSTOLIC BLOOD PRESSURE: 122 MMHG | HEIGHT: 70 IN | BODY MASS INDEX: 31.78 KG/M2 | DIASTOLIC BLOOD PRESSURE: 74 MMHG | OXYGEN SATURATION: 91 % | WEIGHT: 222 LBS

## 2017-08-15 DIAGNOSIS — I47.10 SVT (SUPRAVENTRICULAR TACHYCARDIA): ICD-10-CM

## 2017-08-15 DIAGNOSIS — R07.89 ATYPICAL CHEST PAIN: ICD-10-CM

## 2017-08-15 DIAGNOSIS — I25.10 CORONARY ARTERY DISEASE INVOLVING NATIVE CORONARY ARTERY OF NATIVE HEART WITHOUT ANGINA PECTORIS: ICD-10-CM

## 2017-08-15 PROCEDURE — 93000 ELECTROCARDIOGRAM COMPLETE: CPT | Performed by: INTERNAL MEDICINE

## 2017-08-15 PROCEDURE — 99214 OFFICE O/P EST MOD 30 MIN: CPT | Performed by: INTERNAL MEDICINE

## 2017-08-15 RX ORDER — FLUOCINONIDE 0.5 MG/G
OINTMENT TOPICAL
COMMUNITY
Start: 2017-06-08 | End: 2017-08-15

## 2017-08-15 RX ORDER — ATENOLOL 50 MG/1
50 TABLET ORAL DAILY
COMMUNITY
Start: 2017-08-03 | End: 2018-09-04

## 2017-08-15 RX ORDER — ATORVASTATIN CALCIUM 80 MG/1
80 TABLET, FILM COATED ORAL DAILY
Qty: 90 TAB | Refills: 3 | Status: SHIPPED | OUTPATIENT
Start: 2017-08-15 | End: 2018-03-08

## 2017-08-15 RX ORDER — LISINOPRIL 10 MG/1
TABLET ORAL
COMMUNITY
Start: 2017-07-03 | End: 2017-08-15

## 2017-08-15 ASSESSMENT — ENCOUNTER SYMPTOMS: SHORTNESS OF BREATH: 1

## 2017-08-15 NOTE — PROGRESS NOTES
"Subjective:   Lc Burns is a 56 y.o. female who presents today for follow up of arrhythmia    She had an ablation in [facility] and has not felt good since.      She is 56 yo next month    Everything started 4-5 years ago.  She fell aslepp in chair and woke in sweat.  Went to ER and had a bunch of tests including nuclear stress test and had cardiology care there.  She was seen by [facility] and has not felt good for a couple years.  Heart doctors say lung disease and lung doctors say it is the heart.      Just had ett at [facility] and got to goal hr.  Did not have cp with that.  Sweats profusely.  With cp she gets pain in arm and pain in the jaw.  Poor exercise tolerance.    Chest pain almost every day.  It is not exertional.  She gets occasional palpiations.  She felt this a couple days ago.  She cannot relay whether hr increase and palps is associated with pain.      Past Medical History:   Diagnosis Date   • Persistent atrial fibrillation (CMS-HCC) 2/9/2016   • Sepsis (CMS-HCC) 2/25/2015   • COPD with acute exacerbation (CMS-HCC) 2/25/2015   • ASTHMA 2008    recurring   • Arthritis current    knees, hands, elbows   • Bronchitis    • Cancer (CMS-[facility]) 1980s    Hodgkins lymphoma   • Indigestion current    tx with OTC rolaids   • MI (myocardial infarction)    • Pneumonia      No past surgical history on file.  Family History   Problem Relation Age of Onset   • Heart Disease Mother    • Other Mother 72     pacemaker   • Lung Disease Father      severe asthma   • Alcohol/Drug Maternal Grandfather    • Lung Disease Paternal Grandmother      History   Smoking Status   • Former Smoker   • Quit date: 3/21/2013   Smokeless Tobacco   • Not on file     Comment: quit 4 days ago, smoked less than 1ppd     Allergies   Allergen Reactions   • Cephalexin Anaphylaxis     Rxn date: 2013.   • Clindamycin Anaphylaxis     Rxn date: 2013.   • Codeine Hives, Rash and Itching     Rxn date: \"As a child.\"   • Penicillins Hives, Rash and " "Itching     Rxn date: \"As a child.\"     Outpatient Encounter Prescriptions as of 8/15/2017   Medication Sig Dispense Refill   • atenolol (TENORMIN) 50 MG Tab Take 50 mg by mouth every day.     • atorvastatin (LIPITOR) 80 MG tablet Take 1 Tab by mouth every day. 90 Tab 3   • aspirin (ASA) 81 MG Chew Tab chewable tablet Take 81 mg by mouth every day.     • metformin (GLUCOPHAGE) 500 MG Tab Take 500 mg by mouth every day.     • acetaminophen (TYLENOL) 500 MG Tab Take 1,000 mg by mouth 1 time daily as needed for Mild Pain or Moderate Pain.     • albuterol (VENTOLIN OR PROVENTIL) 108 (90 BASE) MCG/ACT AERS inhalation aerosol Inhale 1-2 Puffs by mouth every four hours as needed for Shortness of Breath. Indications: SOB     • [DISCONTINUED] fluocinonide (LIDEX) 0.05 % Ointment      • [DISCONTINUED] lisinopril (PRINIVIL) 10 MG Tab      • [DISCONTINUED] metformin (GLUCOPHAGE) 1000 MG tablet      • [DISCONTINUED] atenolol (TENORMIN) 25 MG Tab Take 25 mg by mouth every day.     • [DISCONTINUED] simvastatin (ZOCOR) 20 MG TABS Take 1 Tab by mouth every evening. 30 Tab 0     No facility-administered encounter medications on file as of 8/15/2017.      Review of Systems   Respiratory: Positive for shortness of breath.         Objective:   /74   Pulse 71   Ht 1.778 m (5' 10\")   Wt 100.7 kg (222 lb)   LMP 01/01/2006   SpO2 91%   BMI 31.85 kg/m²     Physical Exam   Constitutional: She is oriented to person, place, and time. She appears well-developed and well-nourished. No distress.   HENT:   Head: Normocephalic and atraumatic.   Right Ear: External ear normal.   Left Ear: External ear normal.   Mouth/Throat: Oropharynx is clear and moist.   Eyes: Conjunctivae and EOM are normal. Right eye exhibits no discharge. Left eye exhibits no discharge. No scleral icterus.   Neck: Normal range of motion. Neck supple. No JVD present. No tracheal deviation present. No thyromegaly present.   Cardiovascular: Normal rate, regular rhythm, " normal heart sounds and intact distal pulses.  Exam reveals no gallop and no friction rub.    No murmur heard.  Pulmonary/Chest: Effort normal. No stridor. No respiratory distress. She has no wheezes. She has no rales.   Decreased sounds; long expiratory phase   Abdominal: Soft. She exhibits no distension.   Musculoskeletal: She exhibits no edema or tenderness.   Neurological: She is alert and oriented to person, place, and time. No cranial nerve deficit. Coordination normal.   Skin: Skin is warm and dry. No rash noted. She is not diaphoretic. No erythema. No pallor.   Psychiatric: She has a normal mood and affect. Her behavior is normal. Judgment and thought content normal.   Vitals reviewed.      Assessment:     1. SVT (supraventricular tachycardia) (CMS-HCC)  EKG    atorvastatin (LIPITOR) 80 MG tablet    Echocardiogram Comp w/ Cont   2. Atypical chest pain     3. Coronary artery disease involving native coronary artery of native heart without angina pectoris       50% RCA disease.  rvsp at upper limits normal on echo in 2015; high in 2014  Recent negative stress ecg.  Medical Decision Making:  Today's Assessment / Status / Plan:   Cad- known stenosis- increase intensity of statin.  Her symptoms are really atypical for ischemia though.  Does not appear to be having rapid rates.  Could all be pulmonary htn.  Would like further evaluation for that with echo.

## 2017-08-15 NOTE — MR AVS SNAPSHOT
"        Lc Burns   8/15/2017 2:20 PM   Office Visit   MRN: [id number]    Department:  [facility]   Dept Phone:  [phone]    Description:  Female : 1960   Provider:  Heriberto Caceres M.D.           Reason for Visit     Follow-Up           Allergies as of 8/15/2017     Allergen Noted Reactions    Cephalexin 2015   Anaphylaxis    Rxn date: .    Clindamycin 2015   Anaphylaxis    Rxn date: .    Codeine 2015   Hives, Rash, Itching    Rxn date: \"As a child.\"    Penicillins 2015   Hives, Rash, Itching    Rxn date: \"As a child.\"      You were diagnosed with     SVT (supraventricular tachycardia)   [994186]         Vital Signs     Blood Pressure Pulse Height Weight Body Mass Index Oxygen Saturation    122/74 mmHg 71 1.778 m (5' 10\") 100.699 kg (222 lb) 31.85 kg/m2 91%    Last Menstrual Period Smoking Status                2006 Former Smoker          Basic Information     Date Of Birth Sex Race Ethnicity Preferred Language    1960 Female White Non- English      Problem List              ICD-10-CM Priority Class Noted - Resolved    Panlobular emphysema (CMS-HCC) J43.1   2016 - Present    Overweight E66.3   2016 - Present    Sleep apnea G47.30   2016 - Present    Type 2 diabetes mellitus without complication (CMS-[facility]) E11.9   2016 - Present    Coronary artery disease involving native coronary artery I25.10   2016 - Present    Anxiety F41.9   3/21/2016 - Present    Atypical chest pain R07.89   3/21/2016 - Present    Coronary atherosclerosis of native coronary artery I25.10   3/21/2016 - Present    SVT (supraventricular tachycardia) I47.1   2016 - Present    S/P radiofrequency ablation operation for arrhythmia Z98.890, Z86.79   3/1/2017 - Present      Health Maintenance        Date Due Completion Dates    IMM HEP B VACCINE (1 of 3 - Primary Series) 1960 ---    DIABETES MONOFILAMENT / LE EXAM 1961 ---    RETINAL SCREENING " 10/2/1976 ---    URINE ACR / MICROALBUMIN 10/2/1976 ---    IMM DTaP/Tdap/Td Vaccine (1 - Tdap) 10/2/1977 ---    IMM PNEUMOCOCCAL 19-64 (ADULT) MEDIUM RISK SERIES (1 of 1 - PPSV23) 10/2/1977 ---    PAP SMEAR 10/2/1979 ---    COLONOSCOPY 10/2/2008 ---    FASTING LIPID PROFILE 8/25/2010 8/25/2009    A1C SCREENING 8/25/2015 2/25/2015    MAMMOGRAM 12/9/2016 12/9/2015    IMM INFLUENZA (1) 9/1/2017 ---    SERUM CREATININE 8/5/2018 8/5/2017, 3/18/2016, 11/25/2015, 8/19/2015, 2/26/2015, 2/25/2015, 8/26/2009, 8/25/2009, 8/24/2009            Results       Current Immunizations     No immunizations on file.      Below and/or attached are the medications your provider expects you to take. Review all of your home medications and newly ordered medications with your provider and/or pharmacist. Follow medication instructions as directed by your provider and/or pharmacist. Please keep your medication list with you and share with your provider. Update the information when medications are discontinued, doses are changed, or new medications (including over-the-counter products) are added; and carry medication information at all times in the event of emergency situations     Allergies:  CEPHALEXIN - Anaphylaxis     CLINDAMYCIN - Anaphylaxis     CODEINE - Hives,Rash,Itching     PENICILLINS - Hives,Rash,Itching               Medications  Valid as of: August 15, 2017 -  3:27 PM    Generic Name Brand Name Tablet Size Instructions for use    Acetaminophen (Tab) TYLENOL 500 MG Take 1,000 mg by mouth 1 time daily as needed for Mild Pain or Moderate Pain.        Albuterol Sulfate (Aero Soln) albuterol 108 (90 BASE) MCG/ACT Inhale 1-2 Puffs by mouth every four hours as needed for Shortness of Breath. Indications: SOB        Aspirin (Chew Tab) ASA 81 MG Take 81 mg by mouth every day.        Atenolol (Tab) TENORMIN 50 MG Take 50 mg by mouth every day.        Atorvastatin Calcium (Tab) LIPITOR 80 MG Take 1 Tab by mouth every day.        MetFORMIN  HCl (Tab) GLUCOPHAGE 500 MG Take 500 mg by mouth every day.        .                 Medicines prescribed today were sent to:     [id number] - [place], [place] - [address] [place] [address]    Phone: [phone] Fax: [phone]    Open 24 Hours?: No      Medication refill instructions:       If your prescription bottle indicates you have medication refills left, it is not necessary to call your provider’s office. Please contact your pharmacy and they will refill your medication.    If your prescription bottle indicates you do not have any refills left, you may request refills at any time through one of the following ways: The online [email or web address] system (except Urgent Care), by calling your provider’s office, or by asking your pharmacy to contact your provider’s office with a refill request. Medication refills are processed only during regular business hours and may not be available until the next business day. Your provider may request additional information or to have a follow-up visit with you prior to refilling your medication.   *Please Note: Medication refills are assigned a new Rx number when refilled electronically. Your pharmacy may indicate that no refills were authorized even though a new prescription for the same medication is available at the pharmacy. Please request the medicine by name with the pharmacy before contacting your provider for a refill.        Your To Do List     Future Labs/Procedures Complete By Expires    Echocardiogram Comp w/ Cont  As directed 8/15/2018         [email or web address] Access Code: [email or web address]  Expires: 9/14/2017  3:27 PM    [email or web address]  A secure, online tool to manage your health information     [employer]’s [email or web address]® is a secure, online tool that connects you to your personalized health information from the privacy of your home -- day or night - making it very easy for you to manage your healthcare. Once the activation process is completed, you can  even access your medical information using the Location whitney, which is available for free in the Apple [employer] store or Google Play store.     Location provides the following levels of access (as shown below):   My Chart Features   [facility] Doctor [facility]  Specialists [facility]  Urgent  Care Non-[facility]  Primary Care  Doctor   Email your healthcare team securely and privately 24/7 X X X    Manage appointments: schedule your next appointment; view details of past/upcoming appointments X      Request prescription refills. X      View recent personal medical records, including lab and immunizations X X X X   View health record, including health history, allergies, medications X X X X   Read reports about your outpatient visits, procedures, consult and ER notes X X X X   See your discharge summary, which is a recap of your hospital and/or ER visit that includes your diagnosis, lab results, and care plan. X X       How to register for Location:  1. Go to  [email or web address].  2. Click on the Sign Up Now box, which takes you to the New Member Sign Up page. You will need to provide the following information:  a. Enter your Location Access Code exactly as it appears at the top of this page. (You will not need to use this code after you’ve completed the sign-up process. If you do not sign up before the expiration date, you must request a new code.)   b. Enter your date of birth.   c. Enter your home email address.   d. Click Submit, and follow the next screen’s instructions.  3. Create a Location ID. This will be your Location login ID and cannot be changed, so think of one that is secure and easy to remember.  4. Create a Location password. You can change your password at any time.  5. Enter your Password Reset Question and Answer. This can be used at a later time if you forget your password.   6. Enter your e-mail address. This allows you to receive e-mail notifications when new information is available in  [facility].  7. Click Sign Up. You can now view your health information.    For assistance activating your [facility] account, call [phone]

## 2017-08-16 LAB — EKG IMPRESSION: NORMAL

## 2017-11-17 ENCOUNTER — TELEPHONE (OUTPATIENT)
Dept: CARDIOLOGY | Facility: MEDICAL CENTER | Age: 57
End: 2017-11-17

## 2017-11-18 NOTE — TELEPHONE ENCOUNTER
Attempted to call pt, no answer, detailed vm left, pt has FV with Dr Ang on 11/21/17, informed pt we could just asked Dr Ang during that time.

## 2017-11-18 NOTE — TELEPHONE ENCOUNTER
----- Message from Michael Aguila, Med Ass't sent at 11/17/2017  4:13 PM PST -----  Regarding: Question  Contact: [phone]  SAQIB Patel there L[facility] Martinez wanted to know if Dr. Ang can read her Ech that she is having done on 11/27.    She would like a call back at: [phone].    Thank you so much,    Michael

## 2017-11-27 ENCOUNTER — HOSPITAL ENCOUNTER (OUTPATIENT)
Dept: CARDIOLOGY | Facility: MEDICAL CENTER | Age: 57
End: 2017-11-27
Attending: INTERNAL MEDICINE
Payer: COMMERCIAL

## 2017-11-27 DIAGNOSIS — I47.10 SVT (SUPRAVENTRICULAR TACHYCARDIA): ICD-10-CM

## 2017-11-27 PROCEDURE — 93306 TTE W/DOPPLER COMPLETE: CPT | Mod: 26 | Performed by: INTERNAL MEDICINE

## 2017-11-27 PROCEDURE — 93306 TTE W/DOPPLER COMPLETE: CPT

## 2017-11-28 LAB
LV EJECT FRACT  99904: 60
LV EJECT FRACT MOD 2C 99903: 65.42
LV EJECT FRACT MOD 4C 99902: 67.48
LV EJECT FRACT MOD BP 99901: 65.32

## 2017-12-01 ENCOUNTER — OFFICE VISIT (OUTPATIENT)
Dept: CARDIOLOGY | Facility: MEDICAL CENTER | Age: 57
End: 2017-12-01
Payer: COMMERCIAL

## 2017-12-01 VITALS
HEART RATE: 66 BPM | OXYGEN SATURATION: 92 % | WEIGHT: 220 LBS | DIASTOLIC BLOOD PRESSURE: 82 MMHG | HEIGHT: 70 IN | BODY MASS INDEX: 31.5 KG/M2 | SYSTOLIC BLOOD PRESSURE: 124 MMHG

## 2017-12-01 DIAGNOSIS — I47.10 SVT (SUPRAVENTRICULAR TACHYCARDIA): ICD-10-CM

## 2017-12-01 LAB — EKG IMPRESSION: NORMAL

## 2017-12-01 PROCEDURE — 93000 ELECTROCARDIOGRAM COMPLETE: CPT | Performed by: INTERNAL MEDICINE

## 2017-12-01 PROCEDURE — 99213 OFFICE O/P EST LOW 20 MIN: CPT | Performed by: INTERNAL MEDICINE

## 2017-12-01 RX ORDER — SIMVASTATIN 20 MG
TABLET ORAL
COMMUNITY
Start: 2017-11-16 | End: 2018-11-30

## 2017-12-01 ASSESSMENT — ENCOUNTER SYMPTOMS: PALPITATIONS: 0

## 2017-12-01 NOTE — PROGRESS NOTES
"Subjective:   Lc Burns is a 57 y.o. female who presents today for follow up for a tach and chest pain    She is s/p a tach ablation in the last year. She says she always has cp since that time.  Constant pain in chest.  Echo with rvsp at upper limit of normal.    She Is getting a tingling sensation in left chest and one that goes down the left leg.  Feels like a surge of electricity like when she had the tachycardia.  She can wake with charley horse and cramps        Past Medical History:   Diagnosis Date   • Arthritis current    knees, hands, elbows   • ASTHMA 2008    recurring   • Bronchitis    • Cancer (CMS-HCC) 1980s    Hodgkins lymphoma   • COPD with acute exacerbation (CMS-HCC) 2/25/2015   • Indigestion current    tx with OTC rolaids   • MI (myocardial infarction)    • Persistent atrial fibrillation (CMS-HCC) 2/9/2016   • Pneumonia    • Sepsis (CMS-HCC) 2/25/2015     History reviewed. No pertinent surgical history.  Family History   Problem Relation Age of Onset   • Heart Disease Mother    • Other Mother 72     pacemaker   • Lung Disease Father      severe asthma   • Alcohol/Drug Maternal Grandfather    • Lung Disease Paternal Grandmother      History   Smoking Status   • Former Smoker   • Quit date: 3/21/2013   Smokeless Tobacco   • Never Used     Comment: quit 4 days ago, smoked less than 1ppd     Allergies   Allergen Reactions   • Cephalexin Anaphylaxis     Rxn date: 2013.   • Clindamycin Anaphylaxis     Rxn date: 2013.   • Codeine Hives, Rash and Itching     Rxn date: \"As a child.\"   • Penicillins Hives, Rash and Itching     Rxn date: \"As a child.\"     Outpatient Encounter Prescriptions as of 12/1/2017   Medication Sig Dispense Refill   • simvastatin (ZOCOR) 20 MG Tab      • atenolol (TENORMIN) 50 MG Tab Take 50 mg by mouth every day.     • aspirin (ASA) 81 MG Chew Tab chewable tablet Take 81 mg by mouth every day.     • metformin (GLUCOPHAGE) 500 MG Tab Take 500 mg by mouth every day.     • " "acetaminophen (TYLENOL) 500 MG Tab Take 1,000 mg by mouth 1 time daily as needed for Mild Pain or Moderate Pain.     • albuterol (VENTOLIN OR PROVENTIL) 108 (90 BASE) MCG/ACT AERS inhalation aerosol Inhale 1-2 Puffs by mouth every four hours as needed for Shortness of Breath. Indications: SOB     • atorvastatin (LIPITOR) 80 MG tablet Take 1 Tab by mouth every day. 90 Tab 3     No facility-administered encounter medications on file as of 12/1/2017.      Review of Systems   Cardiovascular: Positive for chest pain. Negative for palpitations.        Objective:   /82   Pulse 66   Ht 1.778 m (5' 10\")   Wt 99.8 kg (220 lb)   LMP 01/01/2006   SpO2 92%   BMI 31.57 kg/m²     Physical Exam   Constitutional: She is oriented to person, place, and time. She appears well-developed and well-nourished. No distress.   HENT:   Head: Normocephalic and atraumatic.   Right Ear: External ear normal.   Left Ear: External ear normal.   Mouth/Throat: Oropharynx is clear and moist.   Eyes: Conjunctivae and EOM are normal. Right eye exhibits no discharge. Left eye exhibits no discharge. No scleral icterus.   Neck: Normal range of motion. Neck supple. No JVD present. No tracheal deviation present. No thyromegaly present.   Cardiovascular: Normal rate, regular rhythm, normal heart sounds and intact distal pulses.  Exam reveals no gallop and no friction rub.    No murmur heard.  Pulmonary/Chest: Effort normal and breath sounds normal. No stridor. No respiratory distress. She has no wheezes. She has no rales.   Abdominal: Soft. She exhibits no distension.   Musculoskeletal: She exhibits no edema or tenderness.   Neurological: She is alert and oriented to person, place, and time. No cranial nerve deficit. Coordination normal.   Skin: Skin is warm and dry. No rash noted. She is not diaphoretic. No erythema. No pallor.   Psychiatric: She has a normal mood and affect. Her behavior is normal. Judgment and thought content normal.   Vitals " reviewed.      Assessment:     1. SVT (supraventricular tachycardia) (CMS-HCC)  EKG       Medical Decision Making:  Today's Assessment / Status / Plan:   A tach appears resolved after ablation  Unclear what sensation in going down the side may be variant migraine  rvsp at upper limits of normal so may be lung pathology.

## 2018-01-12 ENCOUNTER — TELEPHONE (OUTPATIENT)
Dept: CARDIOLOGY | Facility: MEDICAL CENTER | Age: 58
End: 2018-01-12

## 2018-01-12 NOTE — TELEPHONE ENCOUNTER
"Pt walked in stating \" I just don't feel right\".        On O2, appeared in no distress.  Unable to elaborate why she doesn't feel right, but she knows something feels odd.  No c/o pain.  Had written BP's from this AM: 109/63  p 60,  113/66 p 58, 118/59  p 58  109/ 61 p 57.   Gave her reassurance these are normal readings, close to her results from recent OV.    Stated her PCP is not available, does not want to go to ER.      Skin p w d. , no difficulty breathing, sitting quietly.  Advised no appointments available but recommend urgent care for eval of symptoms.  She said she lives close to an urgent care and will go there if she feels it is needed.  "

## 2018-03-08 ENCOUNTER — TELEPHONE (OUTPATIENT)
Dept: CARDIOLOGY | Facility: MEDICAL CENTER | Age: 58
End: 2018-03-08

## 2018-03-08 NOTE — TELEPHONE ENCOUNTER
----- Message from Kristie Webb sent at 3/8/2018  1:45 PM PST -----  Regarding: low h/r, erratic b/p  Contact: [phone]  BETH/brenda    Pt calling to report she is having very low h/r, in mid 50's, b/p is 130/60 - 112/60, erratic.  Pt states she is not getting strong breaths, nausea for a few days, a feeling of fullness in abdomen.  Oxygen 94%, on 3 litres oxygen.   Please call Lc at [phone]

## 2018-03-08 NOTE — TELEPHONE ENCOUNTER
Pt notes HR is low for her 50-55.  She is not sure if it is irregular.  Has been SOB, some nausea and is concerned about erratic BP and low HR.  On atenolol.  Agreed to go to ER if symptoms worsen.  Accepted appt tomorrow morning with AKUA STATON.

## 2018-03-09 ENCOUNTER — OFFICE VISIT (OUTPATIENT)
Dept: CARDIOLOGY | Facility: MEDICAL CENTER | Age: 58
End: 2018-03-09
Payer: COMMERCIAL

## 2018-03-09 VITALS
SYSTOLIC BLOOD PRESSURE: 110 MMHG | DIASTOLIC BLOOD PRESSURE: 70 MMHG | HEART RATE: 72 BPM | WEIGHT: 220 LBS | OXYGEN SATURATION: 92 % | HEIGHT: 70 IN | BODY MASS INDEX: 31.5 KG/M2

## 2018-03-09 DIAGNOSIS — I47.10 SVT (SUPRAVENTRICULAR TACHYCARDIA): ICD-10-CM

## 2018-03-09 DIAGNOSIS — R07.89 ATYPICAL CHEST PAIN: ICD-10-CM

## 2018-03-09 DIAGNOSIS — Z86.79 S/P RADIOFREQUENCY ABLATION OPERATION FOR ARRHYTHMIA: ICD-10-CM

## 2018-03-09 DIAGNOSIS — I25.10 CORONARY ARTERY DISEASE INVOLVING NATIVE CORONARY ARTERY OF NATIVE HEART WITHOUT ANGINA PECTORIS: ICD-10-CM

## 2018-03-09 DIAGNOSIS — Z98.890 S/P RADIOFREQUENCY ABLATION OPERATION FOR ARRHYTHMIA: ICD-10-CM

## 2018-03-09 LAB — EKG IMPRESSION: NORMAL

## 2018-03-09 PROCEDURE — 93000 ELECTROCARDIOGRAM COMPLETE: CPT | Performed by: NURSE PRACTITIONER

## 2018-03-09 PROCEDURE — 99213 OFFICE O/P EST LOW 20 MIN: CPT | Performed by: NURSE PRACTITIONER

## 2018-03-09 ASSESSMENT — ENCOUNTER SYMPTOMS
SHORTNESS OF BREATH: 1
NAUSEA: 1
CHILLS: 0
FEVER: 0
PALPITATIONS: 0
HEADACHES: 1

## 2018-03-09 NOTE — PROGRESS NOTES
Subjective:   Lc Burns is a 57 y.o. female who presents today for bradycardia and multiple other issues. The patient reports that her weight has been as low as 55. She reports that her blood pressure is low today at 110/70. She typically runs 128/80. She sometimes feels like she is going to tip over with her heart rate in the mid 50s. She also reports having a.that is floating in front of her right eye. This comes and goes. She's been having headaches and more shortness of breath. Of note the patient has a history of COPD and is oxygen dependent at 4 L pulsed, 3 L continuous flow. The patient reports having chest pain daily. There is nothing in particular that aggravates the chest pain, however it is sometimes relieved by slowing her breathing and putting her legs up and calming herself. She reports times of being tachycardic into the 120s. She can be sitting at rest. Patient reports that she rides a stationary bike 10-20 miles per day.    An EKG was performed today indicating sinus rhythm, heart rate 60.    The patient has a history of SVT and atrial tachycardia. She is status post radiofrequency ablation by Dr. SRIKANTH Gee in 2016. Patient reports since then she has not felt well.    Past Medical History:   Diagnosis Date   • Arthritis current    knees, hands, elbows   • ASTHMA 2008    recurring   • Bronchitis    • Cancer (CMS-HCC) 1980s    Hodgkins lymphoma   • COPD with acute exacerbation (CMS-HCC) 2/25/2015   • Indigestion current    tx with OTC rolaids   • MI (myocardial infarction)    • Persistent atrial fibrillation (CMS-HCC) 2/9/2016   • Pneumonia    • Sepsis (CMS-HCC) 2/25/2015     No past surgical history on file.  Family History   Problem Relation Age of Onset   • Heart Disease Mother    • Other Mother 72     pacemaker   • Lung Disease Father      severe asthma   • Alcohol/Drug Maternal Grandfather    • Lung Disease Paternal Grandmother      History   Smoking Status   • Former Smoker   • Quit  "date: 3/21/2013   Smokeless Tobacco   • Never Used     Comment: quit 4 days ago, smoked less than 1ppd     Allergies   Allergen Reactions   • Cephalexin Anaphylaxis     Rxn date: 2013.   • Clindamycin Anaphylaxis     Rxn date: 2013.   • Codeine Hives, Rash and Itching     Rxn date: \"As a child.\"   • Penicillins Hives, Rash and Itching     Rxn date: \"As a child.\"     Outpatient Encounter Prescriptions as of 3/9/2018   Medication Sig Dispense Refill   • simvastatin (ZOCOR) 20 MG Tab      • atenolol (TENORMIN) 50 MG Tab Take 50 mg by mouth every day.     • aspirin (ASA) 81 MG Chew Tab chewable tablet Take 81 mg by mouth every day.     • metformin (GLUCOPHAGE) 500 MG Tab Take 500 mg by mouth every day.     • acetaminophen (TYLENOL) 500 MG Tab Take 1,000 mg by mouth 1 time daily as needed for Mild Pain or Moderate Pain.     • albuterol (VENTOLIN OR PROVENTIL) 108 (90 BASE) MCG/ACT AERS inhalation aerosol Inhale 1-2 Puffs by mouth every four hours as needed for Shortness of Breath. Indications: SOB       No facility-administered encounter medications on file as of 3/9/2018.      Review of Systems   Constitutional: Negative for chills and fever.   Eyes:        Pt sees floating dot in front of right eye. Pt reports having diabetic eye exam last month.   Respiratory: Positive for shortness of breath.    Cardiovascular: Positive for chest pain. Negative for palpitations.   Gastrointestinal: Positive for nausea.   Neurological: Positive for headaches.        Objective:   /70   Pulse 72   Ht 1.778 m (5' 10\")   Wt 99.8 kg (220 lb)   LMP 01/01/2006   SpO2 92%   BMI 31.57 kg/m²     Physical Exam   Constitutional: She is oriented to person, place, and time. She appears well-developed and well-nourished.   HENT:   Head: Normocephalic and atraumatic.   Eyes: EOM are normal.   Neck: Normal range of motion. Neck supple.   Cardiovascular: Regular rhythm and normal heart sounds.  Bradycardia present.    No murmur " heard.  Pulses:       Carotid pulses are 2+ on the right side, and 2+ on the left side.       Radial pulses are 2+ on the right side, and 2+ on the left side.   Pulmonary/Chest: Effort normal. She has decreased breath sounds in the right lower field and the left lower field.   Abdominal: Soft. Bowel sounds are normal.   Musculoskeletal: She exhibits no edema.   Neurological: She is alert and oriented to person, place, and time.   Skin: Skin is warm and dry.   Psychiatric: She has a normal mood and affect.       Assessment:   No diagnosis found.    Medical Decision Making:  Today's Assessment / Status / Plan:   1. SVT S/P Ablation  -Continue atenolol 50 mg daily    2. Atypical chest pain with complaints of bradycardia and tachycardia  -ECG today showing sinus rhythm, heart rate 60  -Obtain Holter monitor or the 8 hours  -Outpatient monitor heart rate and blood pressure one time daily and follow-up in 1 month with Holter monitor results    3. CAD  -Continue simvastatin 20 mg nightly and aspirin 81 mg    Collaborating MD is Dr. Yoon. ECG in case up and discussed with M.D.

## 2018-03-09 NOTE — PATIENT INSTRUCTIONS
1. Monitor heart rate and blood pressure one time daily until next visit in one month  2. Obtain Holter monitor for 48 hours to monitor heart rhythm  3. To ER if heart rate is below 50 or above 140.

## 2018-03-22 ENCOUNTER — HOSPITAL ENCOUNTER (OUTPATIENT)
Facility: MEDICAL CENTER | Age: 58
End: 2018-03-22
Attending: ORTHOPAEDIC SURGERY | Admitting: ORTHOPAEDIC SURGERY
Payer: COMMERCIAL

## 2018-04-02 ENCOUNTER — NON-PROVIDER VISIT (OUTPATIENT)
Dept: CARDIOLOGY | Facility: MEDICAL CENTER | Age: 58
End: 2018-04-02
Payer: COMMERCIAL

## 2018-04-02 DIAGNOSIS — I25.10 ATHEROSCLEROSIS OF CORONARY ARTERY, ANGINA PRESENCE UNSPECIFIED, UNSPECIFIED VESSEL OR LESION TYPE, UNSPECIFIED WHETHER NATIVE OR TRANSPLANTED HEART: ICD-10-CM

## 2018-04-04 DIAGNOSIS — I25.10 ATHEROSCLEROSIS OF CORONARY ARTERY, ANGINA PRESENCE UNSPECIFIED, UNSPECIFIED VESSEL OR LESION TYPE, UNSPECIFIED WHETHER NATIVE OR TRANSPLANTED HEART: ICD-10-CM

## 2018-04-05 LAB — EKG IMPRESSION: NORMAL

## 2018-04-05 PROCEDURE — 93224 XTRNL ECG REC UP TO 48 HRS: CPT | Performed by: INTERNAL MEDICINE

## 2018-04-09 ENCOUNTER — OFFICE VISIT (OUTPATIENT)
Dept: CARDIOLOGY | Facility: MEDICAL CENTER | Age: 58
End: 2018-04-09
Payer: COMMERCIAL

## 2018-04-09 VITALS
BODY MASS INDEX: 31.92 KG/M2 | HEART RATE: 74 BPM | WEIGHT: 223 LBS | DIASTOLIC BLOOD PRESSURE: 70 MMHG | HEIGHT: 70 IN | OXYGEN SATURATION: 90 % | SYSTOLIC BLOOD PRESSURE: 110 MMHG

## 2018-04-09 DIAGNOSIS — I49.3 PVC'S (PREMATURE VENTRICULAR CONTRACTIONS): ICD-10-CM

## 2018-04-09 DIAGNOSIS — R00.2 PALPITATIONS: ICD-10-CM

## 2018-04-09 DIAGNOSIS — J43.1 PANLOBULAR EMPHYSEMA (HCC): ICD-10-CM

## 2018-04-09 DIAGNOSIS — I47.10 SVT (SUPRAVENTRICULAR TACHYCARDIA): ICD-10-CM

## 2018-04-09 PROCEDURE — 99214 OFFICE O/P EST MOD 30 MIN: CPT | Performed by: NURSE PRACTITIONER

## 2018-04-09 ASSESSMENT — ENCOUNTER SYMPTOMS
PND: 0
PALPITATIONS: 1
ORTHOPNEA: 0
DIZZINESS: 0
SHORTNESS OF BREATH: 1
WEAKNESS: 0
ABDOMINAL PAIN: 0
CLAUDICATION: 0
COUGH: 0
MYALGIAS: 0

## 2018-04-09 NOTE — PROGRESS NOTES
Chief Complaint   Patient presents with   • Palpitations     follow up holter results       Subjective:   Lc Burns is a 57 y.o. female who presents today to follow-up on palpitations and feeling poorly. She states she feels tired frequently but has severe stage IV COPD and is oxygen dependent.    She was seen by Gisela STATON complaining of some palpitations therefore Holter monitor was ordered. The patient is here for the results.    She continues to complain of fatigue and occasional palpitations or short bursts of fast heart rate. She does have a history of SVT ablation in 2016 done by Dr. Kodi Baird at the [facility]. She denies any rapid, sustained heart rates since having the ablation.    She is on continuous oxygen therapy. She does exercise by riding a stationary bike about 30 minutes daily and tolerates this well without significant shortness of breath.    She complains of some pain over her left breast which is tender to the touch. No exertional, anginal symptoms.    Past Medical History:   Diagnosis Date   • Arthritis current    knees, hands, elbows   • ASTHMA 2008    recurring   • Bronchitis    • Cancer (CMS-HCC) 1980s    Hodgkins lymphoma   • COPD with acute exacerbation (CMS-HCC) 2/25/2015   • Indigestion current    tx with OTC rolaids   • MI (myocardial infarction)    • Persistent atrial fibrillation (CMS-HCC) 2/9/2016   • Pneumonia    • Sepsis (CMS-HCC) 2/25/2015     History reviewed. No pertinent surgical history.  Family History   Problem Relation Age of Onset   • Heart Disease Mother    • Other Mother 72     pacemaker   • Lung Disease Father      severe asthma   • Alcohol/Drug Maternal Grandfather    • Lung Disease Paternal Grandmother      Social History     Social History   • Marital status:      Spouse name: N/A   • Number of children: N/A   • Years of education: N/A     Occupational History   • Not on file.     Social History Main Topics   • Smoking status:  "Former Smoker     Quit date: 3/21/2013   • Smokeless tobacco: Never Used      Comment: quit 4 days ago, smoked less than 1ppd   • Alcohol use Yes   • Drug use: Yes      Comment: former cocaine 'lots of it'   • Sexual activity: Not on file     Other Topics Concern   • Not on file     Social History Narrative   • No narrative on file     Allergies   Allergen Reactions   • Cephalexin Anaphylaxis     Rxn date: 2013.   • Clindamycin Anaphylaxis     Rxn date: 2013.   • Codeine Hives, Rash and Itching     Rxn date: \"As a child.\"   • Penicillins Hives, Rash and Itching     Rxn date: \"As a child.\"     Outpatient Encounter Prescriptions as of 4/9/2018   Medication Sig Dispense Refill   • simvastatin (ZOCOR) 20 MG Tab      • atenolol (TENORMIN) 50 MG Tab Take 50 mg by mouth every day.     • aspirin (ASA) 81 MG Chew Tab chewable tablet Take 81 mg by mouth every day.     • metformin (GLUCOPHAGE) 500 MG Tab Take 500 mg by mouth every day.     • acetaminophen (TYLENOL) 500 MG Tab Take 1,000 mg by mouth 1 time daily as needed for Mild Pain or Moderate Pain.     • albuterol (VENTOLIN OR PROVENTIL) 108 (90 BASE) MCG/ACT AERS inhalation aerosol Inhale 1-2 Puffs by mouth every four hours as needed for Shortness of Breath. Indications: SOB       No facility-administered encounter medications on file as of 4/9/2018.      Review of Systems   Constitutional: Positive for malaise/fatigue.   Respiratory: Positive for shortness of breath (exertion.). Negative for cough.    Cardiovascular: Positive for palpitations (skipping of her heart or bursts of fast heart rate that are brief.). Negative for chest pain, orthopnea, claudication, leg swelling and PND.   Gastrointestinal: Negative for abdominal pain.   Musculoskeletal: Negative for myalgias.   Neurological: Negative for dizziness and weakness.        Objective:   /70   Pulse 74   Ht 1.778 m (5' 10\")   Wt 101.2 kg (223 lb)   LMP 01/01/2006   SpO2 90%   BMI 32.00 kg/m²     Physical " Exam   Constitutional: She is oriented to person, place, and time. She appears well-developed and well-nourished.   HENT:   Head: Normocephalic.   Eyes: EOM are normal.   Neck: No JVD present.   Cardiovascular: Normal rate, regular rhythm and normal heart sounds.    Pulmonary/Chest: Effort normal. She has decreased breath sounds (severely throughout but clear.).   Abdominal: Soft. Bowel sounds are normal.   Musculoskeletal: She exhibits no edema.   Neurological: She is alert and oriented to person, place, and time.   Skin: Skin is warm and dry.   Psychiatric: She has a normal mood and affect.     April 4, 2018: Holter monitor  Interpretive Statements   *  Monitoring for 48 hours.   *  Cardiac rhythm is Sinus.   *  The average heart rate was 73 BPM. The minimum heart rate was 56 BPM.  The maximum heart rate was 128 BPM   *  The patient's rhythm included 25 minutes 40 seconds of sinus bradycardia with minimum heart rate of 56 BPM.  The longest R-R interval was 1.7 seconds occurring at 4:25:26 AM D1.   *  The patient's rhythm included 13 minutes 56 seconds of sinus tachycardia with maximum heart rate of 128 BPM.   * Rare ventricular ectopic activity without any runs.   *  Five atrial runs in which the longest run occurred at 1:31:54 AM D1   consisting of six beats, with maximum heart rate of 111 BPM. The fastest run occurred at 3:08:48 PM D1, consisting of three beats, with maximum heart rate of 176 BPM.  Occasional supraventricular ectopy   *  Diary entries - during monitoring patient pressed event button with no diary entries for those times. Symptoms listed in diary correlates with sinus rhythm without significant ectopy at those times.      Assessment:     1. Palpitations     2. PVC's (premature ventricular contractions)     3. SVT (supraventricular tachycardia) (CMS-HCC)     4. Panlobular emphysema (CMS-HCC)         Medical Decision Making:  Today's Assessment / Status / Plan:     Palpitations: Probably due to  ectopy as she has PACs, PVCs and short runs of atrial tachycardia. She is taking atenolol 25 mg twice a day and we will have her continue this unless she becomes bradycardic. She is reassured that this is benign.    SVT: Status post ablation in 2016. No known recurrence of a rapid sustained heart rate.    Emphysema/COPD: She has severe lung disease and is followed by other providers. She is encouraged to continue her exercise program.    We will have her follow-up in 6 months. She was seen by Dr. Caceres who is left the practice therefore we will have her establish with Dr. Rosalind Orozco. She will follow-up sooner if problems.    Collaborating Provider: Dr. Crooks.    Late entry: We received lab ordered by her primary care physician that was done on descending the 20th 2017: Lipids: Cholesterol 192, triglycerides 146, HDL 49, . [facility] has a metabolic panel: Glucose 212 otherwise within normal limits. Hemoglobin A1c 8.6. TSH 1.0. CBC is within normal limits.    No changes in treatment recommendations based on this lab work. Continue current therapy. Follow-up with primary care regarding diabetes.

## 2018-04-09 NOTE — LETTER
[facility]   [address] [place] [address] [address]  [place] [address]  Phone: [phone]  Fax: [phone]              Lc Burns  1960    Encounter Date: 4/9/2018    TYRONE Vogt          PROGRESS NOTE:  Chief Complaint   Patient presents with   • Palpitations     follow up holter results       Subjective:   Lc Burns is a 57 y.o. female who presents today to follow-up on palpitations and feeling poorly. She states she feels tired frequently but has severe stage IV COPD and is oxygen dependent.    She was seen by Gisela STATON complaining of some palpitations therefore Holter monitor was ordered. The patient is here for the results.    She continues to complain of fatigue and occasional palpitations or short bursts of fast heart rate. She does have a history of SVT ablation in 2016 done by Dr. Kodi Baird at the [place] [facility]. She denies any rapid, sustained heart rates since having the ablation.    She is on continuous oxygen therapy. She does exercise by riding a stationary bike about 30 minutes daily and tolerates this well without significant shortness of breath.    She complains of some pain over her left breast which is tender to the touch. No exertional, anginal symptoms.    Past Medical History:   Diagnosis Date   • Arthritis current    knees, hands, elbows   • ASTHMA 2008    recurring   • Bronchitis    • Cancer (CMS-HCC) 1980s    Hodgkins lymphoma   • COPD with acute exacerbation (CMS-HCC) 2/25/2015   • Indigestion current    tx with OTC rolaids   • MI (myocardial infarction)    • Persistent atrial fibrillation (CMS-HCC) 2/9/2016   • Pneumonia    • Sepsis (CMS-HCC) 2/25/2015     History reviewed. No pertinent surgical history.  Family History   Problem Relation Age of Onset   • Heart Disease Mother    • Other Mother 72     pacemaker   • Lung Disease Father      severe asthma   • Alcohol/Drug Maternal Grandfather    • Lung  "Disease Paternal Grandmother      Social History     Social History   • Marital status:      Spouse name: N/A   • Number of children: N/A   • Years of education: N/A     Occupational History   • Not on file.     Social History Main Topics   • Smoking status: Former Smoker     Quit date: 3/21/2013   • Smokeless tobacco: Never Used      Comment: quit 4 days ago, smoked less than 1ppd   • Alcohol use Yes   • Drug use: Yes      Comment: former cocaine 'lots of it'   • Sexual activity: Not on file     Other Topics Concern   • Not on file     Social History Narrative   • No narrative on file     Allergies   Allergen Reactions   • Cephalexin Anaphylaxis     Rxn date: 2013.   • Clindamycin Anaphylaxis     Rxn date: 2013.   • Codeine Hives, Rash and Itching     Rxn date: \"As a child.\"   • Penicillins Hives, Rash and Itching     Rxn date: \"As a child.\"     Outpatient Encounter Prescriptions as of 4/9/2018   Medication Sig Dispense Refill   • simvastatin (ZOCOR) 20 MG Tab      • atenolol (TENORMIN) 50 MG Tab Take 50 mg by mouth every day.     • aspirin (ASA) 81 MG Chew Tab chewable tablet Take 81 mg by mouth every day.     • metformin (GLUCOPHAGE) 500 MG Tab Take 500 mg by mouth every day.     • acetaminophen (TYLENOL) 500 MG Tab Take 1,000 mg by mouth 1 time daily as needed for Mild Pain or Moderate Pain.     • albuterol (VENTOLIN OR PROVENTIL) 108 (90 BASE) MCG/ACT AERS inhalation aerosol Inhale 1-2 Puffs by mouth every four hours as needed for Shortness of Breath. Indications: SOB       No facility-administered encounter medications on file as of 4/9/2018.      Review of Systems   Constitutional: Positive for malaise/fatigue.   Respiratory: Positive for shortness of breath (exertion.). Negative for cough.    Cardiovascular: Positive for palpitations (skipping of her heart or bursts of fast heart rate that are brief.). Negative for chest pain, orthopnea, claudication, leg swelling and PND.   Gastrointestinal: Negative " "for abdominal pain.   Musculoskeletal: Negative for myalgias.   Neurological: Negative for dizziness and weakness.        Objective:   /70   Pulse 74   Ht 1.778 m (5' 10\")   Wt 101.2 kg (223 lb)   LMP 01/01/2006   SpO2 90%   BMI 32.00 kg/m²      Physical Exam   Constitutional: She is oriented to person, place, and time. She appears well-developed and well-nourished.   HENT:   Head: Normocephalic.   Eyes: EOM are normal.   Neck: No JVD present.   Cardiovascular: Normal rate, regular rhythm and normal heart sounds.    Pulmonary/Chest: Effort normal. She has decreased breath sounds (severely throughout but clear.).   Abdominal: Soft. Bowel sounds are normal.   Musculoskeletal: She exhibits no edema.   Neurological: She is alert and oriented to person, place, and time.   Skin: Skin is warm and dry.   Psychiatric: She has a normal mood and affect.     April 4, 2018: Holter monitor  Interpretive Statements   *  Monitoring for 48 hours.   *  Cardiac rhythm is Sinus.   *  The average heart rate was 73 BPM. The minimum heart rate was 56 BPM.  The maximum heart rate was 128 BPM   *  The patient's rhythm included 25 minutes 40 seconds of sinus bradycardia with minimum heart rate of 56 BPM.  The longest R-R interval was 1.7 seconds occurring at 4:25:26 AM D1.   *  The patient's rhythm included 13 minutes 56 seconds of sinus tachycardia with maximum heart rate of 128 BPM.   * Rare ventricular ectopic activity without any runs.   *  Five atrial runs in which the longest run occurred at 1:31:54 AM D1   consisting of six beats, with maximum heart rate of 111 BPM. The fastest run occurred at 3:08:48 PM D1, consisting of three beats, with maximum heart rate of 176 BPM.  Occasional supraventricular ectopy   *  Diary entries - during monitoring patient pressed event button with no diary entries for those times. Symptoms listed in diary correlates with sinus rhythm without significant ectopy at those times.      Assessment:  "     1. Palpitations     2. PVC's (premature ventricular contractions)     3. SVT (supraventricular tachycardia) (CMS-HCC)     4. Panlobular emphysema (CMS-HCC)         Medical Decision Making:  Today's Assessment / Status / Plan:     Palpitations: Probably due to ectopy as she has PACs, PVCs and short runs of atrial tachycardia. She is taking atenolol 25 mg twice a day and we will have her continue this unless she becomes bradycardic. She is reassured that this is benign.    SVT: Status post ablation in 2016. No known recurrence of a rapid sustained heart rate.    Emphysema/COPD: She has severe lung disease and is followed by other providers. She is encouraged to continue her exercise program.    We will have her follow-up in 6 months. She was seen by Dr. Caceres who is left the practice therefore we will have her establish with Dr. Rosalind Orozco. She will follow-up sooner if problems.    Collaborating Provider: Dr. Crooks.    Late entry: We received lab ordered by her primary care physician that was done on descending the 20th 2017: Lipids: Cholesterol 192, triglycerides 146, HDL 49, . [facility] has a metabolic panel: Glucose 212 otherwise within normal limits. Hemoglobin A1c 8.6. TSH 1.0. CBC is within normal limits.    No changes in treatment recommendations based on this lab work. Continue current therapy. Follow-up with primary care regarding diabetes.      No Recipients

## 2018-06-06 ENCOUNTER — APPOINTMENT (OUTPATIENT)
Dept: RADIOLOGY | Facility: MEDICAL CENTER | Age: 58
End: 2018-06-06
Attending: EMERGENCY MEDICINE
Payer: COMMERCIAL

## 2018-06-06 ENCOUNTER — HOSPITAL ENCOUNTER (OUTPATIENT)
Facility: MEDICAL CENTER | Age: 58
End: 2018-06-07
Attending: EMERGENCY MEDICINE | Admitting: FAMILY MEDICINE
Payer: COMMERCIAL

## 2018-06-06 VITALS
RESPIRATION RATE: 18 BRPM | TEMPERATURE: 97.1 F | OXYGEN SATURATION: 93 % | HEART RATE: 68 BPM | BODY MASS INDEX: 30.13 KG/M2 | SYSTOLIC BLOOD PRESSURE: 152 MMHG | WEIGHT: 222.44 LBS | DIASTOLIC BLOOD PRESSURE: 71 MMHG | HEIGHT: 72 IN

## 2018-06-06 DIAGNOSIS — R07.9 CHEST PAIN, UNSPECIFIED TYPE: ICD-10-CM

## 2018-06-06 DIAGNOSIS — R06.02 SHORTNESS OF BREATH: ICD-10-CM

## 2018-06-06 LAB
ALBUMIN SERPL BCP-MCNC: 4.6 G/DL (ref 3.2–4.9)
ALBUMIN/GLOB SERPL: 1.4 G/DL
ALP SERPL-CCNC: 77 U/L (ref 30–99)
ALT SERPL-CCNC: 22 U/L (ref 2–50)
ANION GAP SERPL CALC-SCNC: 9 MMOL/L (ref 0–11.9)
AST SERPL-CCNC: 14 U/L (ref 12–45)
BASOPHILS # BLD AUTO: 1.1 % (ref 0–1.8)
BASOPHILS # BLD: 0.09 K/UL (ref 0–0.12)
BILIRUB SERPL-MCNC: 0.3 MG/DL (ref 0.1–1.5)
BUN SERPL-MCNC: 16 MG/DL (ref 8–22)
CALCIUM SERPL-MCNC: 9.9 MG/DL (ref 8.5–10.5)
CHLORIDE SERPL-SCNC: 102 MMOL/L (ref 96–112)
CO2 SERPL-SCNC: 26 MMOL/L (ref 20–33)
CREAT SERPL-MCNC: 0.91 MG/DL (ref 0.5–1.4)
EKG IMPRESSION: NORMAL
EOSINOPHIL # BLD AUTO: 0.22 K/UL (ref 0–0.51)
EOSINOPHIL NFR BLD: 2.8 % (ref 0–6.9)
ERYTHROCYTE [DISTWIDTH] IN BLOOD BY AUTOMATED COUNT: 41.1 FL (ref 35.9–50)
GLOBULIN SER CALC-MCNC: 3.3 G/DL (ref 1.9–3.5)
GLUCOSE SERPL-MCNC: 169 MG/DL (ref 65–99)
HCT VFR BLD AUTO: 42.4 % (ref 37–47)
HGB BLD-MCNC: 13.9 G/DL (ref 12–16)
IMM GRANULOCYTES # BLD AUTO: 0.02 K/UL (ref 0–0.11)
IMM GRANULOCYTES NFR BLD AUTO: 0.3 % (ref 0–0.9)
LYMPHOCYTES # BLD AUTO: 2.86 K/UL (ref 1–4.8)
LYMPHOCYTES NFR BLD: 35.9 % (ref 22–41)
MCH RBC QN AUTO: 30 PG (ref 27–33)
MCHC RBC AUTO-ENTMCNC: 32.8 G/DL (ref 33.6–35)
MCV RBC AUTO: 91.6 FL (ref 81.4–97.8)
MONOCYTES # BLD AUTO: 0.46 K/UL (ref 0–0.85)
MONOCYTES NFR BLD AUTO: 5.8 % (ref 0–13.4)
NEUTROPHILS # BLD AUTO: 4.32 K/UL (ref 2–7.15)
NEUTROPHILS NFR BLD: 54.1 % (ref 44–72)
NRBC # BLD AUTO: 0 K/UL
NRBC BLD-RTO: 0 /100 WBC
PLATELET # BLD AUTO: 195 K/UL (ref 164–446)
PMV BLD AUTO: 9.9 FL (ref 9–12.9)
POTASSIUM SERPL-SCNC: 4 MMOL/L (ref 3.6–5.5)
PROT SERPL-MCNC: 7.9 G/DL (ref 6–8.2)
RBC # BLD AUTO: 4.63 M/UL (ref 4.2–5.4)
SODIUM SERPL-SCNC: 137 MMOL/L (ref 135–145)
TROPONIN I SERPL-MCNC: <0.01 NG/ML (ref 0–0.04)
WBC # BLD AUTO: 8 K/UL (ref 4.8–10.8)

## 2018-06-06 PROCEDURE — 80053 COMPREHEN METABOLIC PANEL: CPT

## 2018-06-06 PROCEDURE — 85025 COMPLETE CBC W/AUTO DIFF WBC: CPT

## 2018-06-06 PROCEDURE — 84484 ASSAY OF TROPONIN QUANT: CPT

## 2018-06-06 PROCEDURE — 93005 ELECTROCARDIOGRAM TRACING: CPT | Performed by: EMERGENCY MEDICINE

## 2018-06-06 PROCEDURE — G0378 HOSPITAL OBSERVATION PER HR: HCPCS

## 2018-06-06 PROCEDURE — 304561 HCHG STAT O2

## 2018-06-06 PROCEDURE — 93005 ELECTROCARDIOGRAM TRACING: CPT

## 2018-06-06 PROCEDURE — 99284 EMERGENCY DEPT VISIT MOD MDM: CPT

## 2018-06-06 PROCEDURE — 71045 X-RAY EXAM CHEST 1 VIEW: CPT

## 2018-06-06 RX ORDER — LANOLIN ALCOHOL/MO/W.PET/CERES
CREAM (GRAM) TOPICAL 3 TIMES DAILY PRN
Status: DISCONTINUED | OUTPATIENT
Start: 2018-06-06 | End: 2018-06-07 | Stop reason: HOSPADM

## 2018-06-06 RX ORDER — LANOLIN ALCOHOL/MO/W.PET/CERES
CREAM (GRAM) TOPICAL 3 TIMES DAILY PRN
Status: ON HOLD | COMMUNITY
End: 2019-02-27

## 2018-06-06 RX ORDER — PROMETHAZINE HYDROCHLORIDE 25 MG/1
12.5-25 SUPPOSITORY RECTAL EVERY 4 HOURS PRN
Status: DISCONTINUED | OUTPATIENT
Start: 2018-06-06 | End: 2018-06-07 | Stop reason: HOSPADM

## 2018-06-06 RX ORDER — ATENOLOL 50 MG/1
50 TABLET ORAL DAILY
Status: DISCONTINUED | OUTPATIENT
Start: 2018-06-07 | End: 2018-06-07 | Stop reason: HOSPADM

## 2018-06-06 RX ORDER — ENALAPRILAT 1.25 MG/ML
1.25 INJECTION INTRAVENOUS EVERY 6 HOURS PRN
Status: DISCONTINUED | OUTPATIENT
Start: 2018-06-06 | End: 2018-06-07 | Stop reason: HOSPADM

## 2018-06-06 RX ORDER — ATORVASTATIN CALCIUM 20 MG/1
40 TABLET, FILM COATED ORAL EVERY EVENING
Status: DISCONTINUED | OUTPATIENT
Start: 2018-06-06 | End: 2018-06-06

## 2018-06-06 RX ORDER — BISACODYL 10 MG
10 SUPPOSITORY, RECTAL RECTAL
Status: DISCONTINUED | OUTPATIENT
Start: 2018-06-06 | End: 2018-06-07 | Stop reason: HOSPADM

## 2018-06-06 RX ORDER — NITROGLYCERIN 0.4 MG/1
0.4 TABLET SUBLINGUAL
Status: DISCONTINUED | OUTPATIENT
Start: 2018-06-06 | End: 2018-06-07 | Stop reason: HOSPADM

## 2018-06-06 RX ORDER — POLYETHYLENE GLYCOL 3350 17 G/17G
1 POWDER, FOR SOLUTION ORAL
Status: DISCONTINUED | OUTPATIENT
Start: 2018-06-06 | End: 2018-06-07 | Stop reason: HOSPADM

## 2018-06-06 RX ORDER — AMOXICILLIN 250 MG
2 CAPSULE ORAL 2 TIMES DAILY
Status: DISCONTINUED | OUTPATIENT
Start: 2018-06-06 | End: 2018-06-07 | Stop reason: HOSPADM

## 2018-06-06 RX ORDER — SIMVASTATIN 40 MG
20 TABLET ORAL EVERY EVENING
Status: DISCONTINUED | OUTPATIENT
Start: 2018-06-06 | End: 2018-06-07 | Stop reason: HOSPADM

## 2018-06-06 RX ORDER — LABETALOL HYDROCHLORIDE 5 MG/ML
10 INJECTION, SOLUTION INTRAVENOUS EVERY 4 HOURS PRN
Status: DISCONTINUED | OUTPATIENT
Start: 2018-06-06 | End: 2018-06-07 | Stop reason: HOSPADM

## 2018-06-06 RX ORDER — ACETAMINOPHEN 325 MG/1
650 TABLET ORAL EVERY 6 HOURS PRN
Status: DISCONTINUED | OUTPATIENT
Start: 2018-06-06 | End: 2018-06-07 | Stop reason: HOSPADM

## 2018-06-06 RX ORDER — ALBUTEROL SULFATE 90 UG/1
1-2 AEROSOL, METERED RESPIRATORY (INHALATION)
Status: DISCONTINUED | OUTPATIENT
Start: 2018-06-06 | End: 2018-06-07 | Stop reason: HOSPADM

## 2018-06-06 RX ORDER — PROMETHAZINE HYDROCHLORIDE 25 MG/1
12.5-25 TABLET ORAL EVERY 4 HOURS PRN
Status: DISCONTINUED | OUTPATIENT
Start: 2018-06-06 | End: 2018-06-07 | Stop reason: HOSPADM

## 2018-06-06 RX ORDER — ONDANSETRON 2 MG/ML
4 INJECTION INTRAMUSCULAR; INTRAVENOUS EVERY 4 HOURS PRN
Status: DISCONTINUED | OUTPATIENT
Start: 2018-06-06 | End: 2018-06-07 | Stop reason: HOSPADM

## 2018-06-06 RX ORDER — DEXTROSE MONOHYDRATE 25 G/50ML
25 INJECTION, SOLUTION INTRAVENOUS
Status: DISCONTINUED | OUTPATIENT
Start: 2018-06-06 | End: 2018-06-07 | Stop reason: HOSPADM

## 2018-06-06 RX ORDER — ONDANSETRON 4 MG/1
4 TABLET, ORALLY DISINTEGRATING ORAL EVERY 4 HOURS PRN
Status: DISCONTINUED | OUTPATIENT
Start: 2018-06-06 | End: 2018-06-07 | Stop reason: HOSPADM

## 2018-06-06 RX ORDER — NITROGLYCERIN 0.4 MG/1
0.4 TABLET SUBLINGUAL ONCE
Status: DISCONTINUED | OUTPATIENT
Start: 2018-06-06 | End: 2018-06-07 | Stop reason: HOSPADM

## 2018-06-06 ASSESSMENT — PAIN SCALES - GENERAL: PAINLEVEL_OUTOF10: 4

## 2018-06-06 ASSESSMENT — LIFESTYLE VARIABLES: DO YOU DRINK ALCOHOL: NO

## 2018-06-07 LAB — TROPONIN I SERPL-MCNC: <0.01 NG/ML (ref 0–0.04)

## 2018-06-07 PROCEDURE — G0378 HOSPITAL OBSERVATION PER HR: HCPCS

## 2018-06-07 ASSESSMENT — ENCOUNTER SYMPTOMS
PALPITATIONS: 0
DIAPHORESIS: 0
DIZZINESS: 0
DEPRESSION: 0
HEADACHES: 0
WHEEZING: 0
MYALGIAS: 0
DIARRHEA: 0
FEVER: 0
COUGH: 0
BLURRED VISION: 0
ABDOMINAL PAIN: 0
VOMITING: 0
SORE THROAT: 0
NAUSEA: 0
HEARTBURN: 0
CHILLS: 0
SHORTNESS OF BREATH: 1

## 2018-06-07 NOTE — ED NOTES
Pt refused nitro at 2215, pt states the pain is bearable and does not want any medication at this time, pt was educated by nurse why nitro was ordered but pt still refuses.

## 2018-06-07 NOTE — ED PROVIDER NOTES
ER Provider Note     Scribed for Howard Shipley M.D. by Sp Turner. 6/6/2018, 8:23 PM.    Primary Care Provider: Gaetano Guardado M.D.  Means of Arrival: Walk-In   History obtained from: Patient  History limited by: None     CHIEF COMPLAINT  Chief Complaint   Patient presents with   • Chest Pain     worsening since this am, L-sided radiating to R chest, rates pain 4/10, worse with inspiration   • COPD     oxygen-dependent 3L NC at home, now increased oxygen demands with chest pain 5/10       HPI  Lc Burns is a 57 y.o. female with history of COPD who presents to the Emergency Department for evaluation of left sided chest pain that radiates to the right side onset this morning while walking up stairs that has progressively worsened in severity throughout the day. She endorses associated shortness of breath. The patient is currently on 3L of at home supplemental oxygen. She reports to have taken 10 baby aspirin, 2 rapid release tylenol and 2 buffered aspirin earlier today which provided minimal alleviation of the pain. She states with prior episodes of chest pain, aspirin typically alleviates the pain. The patient has prior medical history of a cardiac event in 2013. She had a cardiac work up at that time, but is unsure of what the results were. The patient additionally had a cardiac ablation about 2 years ago. She also has prior medical history of a lobectomy. Patient is negative for fever. No alleviating or exacerbating factors are identified at this time.     REVIEW OF SYSTEMS  See HPI for further details. All other systems are negative.   C.    PAST MEDICAL HISTORY   has a past medical history of Arthritis (current); ASTHMA (2008); Bronchitis; Cancer (HCC) (1980s); COPD with acute exacerbation ([facility]) (2/25/2015); Hypertension; Indigestion (current); MI (myocardial infarction) ([facility]); Oxygen dependent; Persistent atrial fibrillation ([facility]) (2/9/2016); Pneumonia; and Sepsis ([facility]) (2/25/2015).    SURGICAL  "HISTORY   has a past surgical history that includes other cardiac surgery; wedge resection lung; and cardiac cath, right/left heart.    SOCIAL HISTORY  Social History   Substance Use Topics   • Smoking status: Former Smoker     Quit date: 3/21/2013   • Smokeless tobacco: Never Used      Comment: quit 4 days ago, smoked less than 1ppd   • Alcohol use No      History   Drug Use No     Comment: former cocaine 'lots of it', former meth clean 9 years       FAMILY HISTORY  Family History   Problem Relation Age of Onset   • Heart Disease Mother    • Other Mother 72     pacemaker   • Lung Disease Father      severe asthma   • Alcohol/Drug Maternal Grandfather    • Lung Disease Paternal Grandmother        CURRENT MEDICATIONS  Home Medications     Reviewed by Lauryn Mulligan R.N. (Registered Nurse) on 06/06/18 at 2009  Med List Status: Complete   Medication Last Dose Status   acetaminophen (TYLENOL) 500 MG Tab 6/6/2018 Active   albuterol (VENTOLIN OR PROVENTIL) 108 (90 BASE) MCG/ACT AERS inhalation aerosol 6/6/2018 Active   aspirin (ASA) 81 MG Chew Tab chewable tablet 6/6/2018 Active   atenolol (TENORMIN) 50 MG Tab 6/6/2018 Active   metformin (GLUCOPHAGE) 500 MG Tab 6/6/2018 Active   NS SOLN 60 mL with albuterol 2.5 mg/0.5 mL NEBU 5 mL PRN Active   simvastatin (ZOCOR) 20 MG Tab 6/5/2018 Active   Skin Protectants, Misc. (EUCERIN) Cream 6/5/2018 Active                ALLERGIES  Allergies   Allergen Reactions   • Cephalexin Anaphylaxis     Rxn date: 2013.   • Clindamycin Anaphylaxis     Rxn date: 2013.   • Codeine Hives, Rash and Itching     Rxn date: \"As a child.\"   • Penicillins Hives, Rash and Itching     Rxn date: \"As a child.\"       PHYSICAL EXAM  VITAL SIGNS: /71   Pulse 68   Temp 36.2 °C (97.1 °F)   Resp 18   Ht 1.829 m (6')   Wt 100.9 kg (222 lb 7.1 oz)   LMP 01/01/2006   SpO2 93%   BMI 30.17 kg/m²      Constitutional: Alert in no apparent distress. Nasal canula in place.   HENT: No signs of trauma, " Bilateral external ears normal, Nose normal.   Eyes: Pupils are equal and reactive, Conjunctiva normal, Non-icteric.   Neck: Normal range of motion, No tenderness, Supple, No stridor.   Lymphatic: No lymphadenopathy noted.   Cardiovascular: Regular rate and rhythm, no murmurs.   Thorax & Lungs: Normal breath sounds, No obvious respiratory distress, No wheezing, No chest tenderness.   Abdomen: Bowel sounds normal, Soft, No tenderness, No masses, No pulsatile masses. No peritoneal signs.  Skin: Warm, Dry, No erythema, No rash.   Back: No bony tenderness, No CVA tenderness.   Extremities: Intact distal pulses, No tenderness, No cyanosis. Trace edema in lower extremities.   Musculoskeletal: Good range of motion in all major joints. No tenderness to palpation or major deformities noted.   Neurologic: Alert , Normal motor function, Normal sensory function, No focal deficits noted.   Psychiatric: Affect normal, Judgment normal, Mood normal.     DIAGNOSTIC STUDIES / PROCEDURES    EKG Interpretation:  Interpreted by me    12 Lead EKG interpreted by me to show:  Normal sinus rhythm  Normal sinus rhythm with T-wave inversion noted in lead V2.  There is no ST elevation or depression.  There is no ectopy.  His normal axis.    LABS  Labs Reviewed   CBC WITH DIFFERENTIAL - Abnormal; Notable for the following:        Result Value    MCHC 32.8 (*)     All other components within normal limits   COMP METABOLIC PANEL - Abnormal; Notable for the following:     Glucose 169 (*)     All other components within normal limits   TROPONIN   ESTIMATED GFR     All labs reviewed by me.    RADIOLOGY  DX-CHEST-PORTABLE (1 VIEW)   Final Result      No acute cardiopulmonary abnormality.        The radiologist's interpretation of all radiological studies have been reviewed by me.    COURSE & MEDICAL DECISION MAKING  Pertinent Labs & Imaging studies reviewed. (See chart for details)    This is a 57 y.o. female that presents with chest pain as well as  shortness of breath.  The symptoms are concerning for cardiac etiology.  Based upon my exam I do not believe this is COPD exacerbation.  I will get cardiac enzymes as well as a chest x-ray to evaluate the cause of the patient's chest pain.  She is already taken aspirin.  I will give the patient nitro and assess if this improves the patient's chest pain.    Strict cardiac stress test performed in August of last year shows  Mild nonpsecific ST depressio inferior leads but < 1 mm Mild nonpsecific ST depressio inferior leads but < 1 mm    8:23 PM - Patient seen and examined at bedside. Ordered Troponin, CMP, CBC, Estimated GFR, DX-Chest, and EKG.     9:40 PM - Reviewed patient's lab and radiology results as shown above.     9:45 PM - Paged [facility].    The patient has a normal troponin.  The patient has no elect light derangements.  The patient's glucose is 169.  The patient's chest x-ray shows no acute disease.  Given the patient's history and recent admission to [facility] I believe it is important to admit the patient for a cardiac workup.    9:49 PM - Consult with Dr. Max, [facility], who agrees to admit the patient.    11:45 PM Patient Reevaluated at bedside. She was informed she will be admitted. Patient not agreeable at this time. The patient is leaving against medical advice.  I discussed with the patient the risks of leaving without receiving appropriate care to include permanent disability or death.  I have discussed possible alternatives.  The patient is not intoxicated.  The patient is a capable decision maker and understands the risks and benefits of their decision.  While I certainly disagree with the patient's decision, I respect the patient's autonomy and will not keep them here against their will.  They understand that their decision to leave can be reversed at any time and they can return to us at any time for any reason at all.       The patient will return for new or  worsening symptoms and is stable at the time of discharge.    DISPOSITION:  Patient will be discharged against medical advice.     FOLLOW UP:  Linda Ang M.D.  [address]  [address]  [place] [id number] [phone]    In 2 days        OUTPATIENT MEDICATIONS:  New Prescriptions    No medications on file         FINAL IMPRESSION  1. Chest pain, unspecified type    2. Shortness of breath          Sp HUANG (Rosa Isela), am scribing for, and in the presence of, Howard Shipley M.D..    Electronically signed by: pS Turner (Rosa Isela), 6/6/2018    Howard HUANG M.D. personally performed the services described in this documentation, as scribed by Sp Turner in my presence, and it is both accurate and complete.     The note accurately reflects work and decisions made by me.  Howard Shipley  6/7/2018  2:33 AM

## 2018-06-07 NOTE — ED NOTES
"Pt states  \" I want to leave you have been great but the hospital is giving me anxiety plus I just went to your bathroom and I have germ problems and that bathroom was disgusting\"  Dr. amador notified and both doc and nurse educated patient on reasons why pt would need to stay.  Pt states to nurse\" I am very much ready to go\".  Dr. amador left in room speaking with patient.   "

## 2018-06-07 NOTE — ED NOTES
Pt is cooperative but refuses vitals at this time pt states she is ready to go.  Pt is not verbally aggressive with nurse but is very amendment she does not need to be admitted.    Pt ambulatory with steady gait, pt verbalized understanding of poc and discharge , no questions ,  VSS and pt in nad at this time

## 2018-06-07 NOTE — H&P
"FAMILY MEDICINE HISTORY AND PHYSICAL    Primary Care Physician: Gaetano Guardado M.D.     Patient ID:  Name:             Lc Burns     YOB: 1960  Age:                 57 y.o.  female   MRN:               [id number]    Attending Physician:  Dr. Hutchins   Senior Resident:  Dr. Max    CHIEF COMPLAINT  had concerns including Chest Pain (worsening since this am, L-sided radiating to R chest, rates pain 4/10, worse with inspiration) and COPD (oxygen-dependent 3L NC at home, now increased oxygen demands with chest pain 5/10).    HPI  Lc Burns is a 57 y.o. female who presents with complaints of progressively worsening chest pain that began this morning. She localizes the pain beneath her right clavicle and states it radiates across her chest to her left upper extremity. She describes it as a tightness sensation and states,\"I feel like I have a blockage,\" and rates the pain a 4/10. She denies aggravating or alleviating factors. She reports a history of similar symptoms over the past 2 years, but worse over the past 1 to 2 months. She states the pain today is different than previous episodes because it normally resolves after taking a baby ASA, but this time the pain did not resolve, prompting her to come to the emergency room to be further evaluated. She states she has been under the care of multiple cardiologists in the past for similar symptoms and has not been given a definitive answer regarding the etiology of her chest pain and reports feeling very \"irritated and frustrated\" as she reports \"feeling crappy all the time.\" She recently had a Holter monitor for complaints of palpitations that showed PAC's, PVC's and short runs of atrial tachycardia. She had an echocardiogram in 11/2017 that showed EF 60% with RVSP at upper limits of normal and a cardiac stress test in 8/2017 that showed mild non-specific ST depression in inferior leads, but < 1 mm. She does report increasing SOB today, but " "remains at her baseline home O2 requirement. She states she is not having an exacerbation of her COPD and denies cough, sputum production, fevers, chills. She also denies nausea, vomiting, diaphoresis associated with chest pain. Lastly, she is currently undergoing lots of stress as her 13 year old grand-daughter was admitted to the PICU today for attempting suicide.     Her ED course was significant for a chest x-ray showing borderline heart enlargement with otherwise no acute cardiopulmonary abn. Her trop was WNL x 1 and her EKG showed NSR with T wave inversion in lead V2 and no ST elevation or depression. The remainder of her labs which included a CBC and CMP were WNL. She adamantly refused NTG due to it \"probably costing 1,000 dollars.\"      Review of Systems   Constitutional: Positive for malaise/fatigue. Negative for chills, diaphoresis and fever.   HENT: Negative for sore throat.    Eyes: Negative for blurred vision.   Respiratory: Positive for shortness of breath. Negative for cough and wheezing.    Cardiovascular: Positive for chest pain. Negative for palpitations and leg swelling.   Gastrointestinal: Negative for abdominal pain, diarrhea, heartburn, nausea and vomiting.   Genitourinary: Negative for dysuria and urgency.   Musculoskeletal: Negative for myalgias.   Skin: Positive for itching and rash.   Neurological: Negative for dizziness and headaches.   Psychiatric/Behavioral: Negative for depression.       PAST MEDICAL HISTORY   has a past medical history of Arthritis (current); ASTHMA (2008); Bronchitis; Cancer ([facility]) (1980s); COPD with acute exacerbation ([facility]) (2/25/2015); Hypertension; Indigestion (current); MI (myocardial infarction) ([facility]); Oxygen dependent; Persistent atrial fibrillation ([facility]) (2/9/2016); Pneumonia; and Sepsis ([facility]) (2/25/2015).  Past Medical History:   Diagnosis Date   • Arthritis current    knees, hands, elbows   • ASTHMA 2008    recurring   • Bronchitis    • Cancer ([facility]) 1980s    " "Hodgkins lymphoma   • COPD with acute exacerbation (HCC) 2/25/2015   • Hypertension    • Indigestion current    tx with OTC rolaids   • MI (myocardial infarction) (HCC)    • Oxygen dependent    • Persistent atrial fibrillation (HCC) 2/9/2016   • Pneumonia    • Sepsis (HCC) 2/25/2015   · Stage IV COPD  · Pt states she has never been told she had an MI, but was told \"an abnormality was found.\"  · Pt states she is unsure if she has A-fib as she has been told different things by different doctors.      SURGICAL HISTORY   has a past surgical history that includes other cardiac surgery; wedge resection lung; and cardiac cath, right/left heart.  Past Surgical History:   Procedure Laterality Date   • CARDIAC CATH, RIGHT/LEFT HEART     • OTHER CARDIAC SURGERY     • WEDGE RESECTION LUNG     · Per patient the cardiac cath showed \"70% blockage\" and a stent was not placed      CURRENT MEDICATIONS  Home Medications     Reviewed by Lauryn Mulligan R.N. (Registered Nurse) on 06/06/18 at 2009  Med List Status: Complete   Medication Last Dose Status   acetaminophen (TYLENOL) 500 MG Tab 6/6/2018 Active   albuterol (VENTOLIN OR PROVENTIL) 108 (90 BASE) MCG/ACT AERS inhalation aerosol 6/6/2018 Active   aspirin (ASA) 81 MG Chew Tab chewable tablet 6/6/2018 Active   atenolol (TENORMIN) 50 MG Tab 6/6/2018 Active   metformin (GLUCOPHAGE) 500 MG Tab 6/6/2018 Active   NS SOLN 60 mL with albuterol 2.5 mg/0.5 mL NEBU 5 mL PRN Active   simvastatin (ZOCOR) 20 MG Tab 6/5/2018 Active   Skin Protectants, Misc. (EUCERIN) Cream 6/5/2018 Active              Patient's Medications   New Prescriptions    No medications on file   Previous Medications    ACETAMINOPHEN (TYLENOL) 500 MG TAB    Take 1,000 mg by mouth 1 time daily as needed for Mild Pain or Moderate Pain.    ALBUTEROL (VENTOLIN OR PROVENTIL) 108 (90 BASE) MCG/ACT AERS INHALATION AEROSOL    Inhale 1-2 Puffs by mouth every four hours as needed for Shortness of Breath. Indications: SOB    ASPIRIN " "(ASA) 81 MG CHEW TAB CHEWABLE TABLET    Take 81 mg by mouth every day.    ATENOLOL (TENORMIN) 50 MG TAB    Take 50 mg by mouth every day.    METFORMIN (GLUCOPHAGE) 500 MG TAB    Take 500 mg by mouth every day.    NS SOLN 60 ML WITH ALBUTEROL 2.5 MG/0.5 ML NEBU 5 ML    5 mg/hr by Nebulization route.    SIMVASTATIN (ZOCOR) 20 MG TAB        SKIN PROTECTANTS, MISC. (EUCERIN) CREAM    Apply  to affected area(s) 3 times a day as needed.   Modified Medications    No medications on file   Discontinued Medications    No medications on file       ALLERGIES  Allergies   Allergen Reactions   • Cephalexin Anaphylaxis     Rxn date: 2013.   • Clindamycin Anaphylaxis     Rxn date: 2013.   • Codeine Hives, Rash and Itching     Rxn date: \"As a child.\"   • Penicillins Hives, Rash and Itching     Rxn date: \"As a child.\"       SOCIAL HISTORY   reports that she quit smoking about 5 years ago. She has never used smokeless tobacco. She reports that she does not drink alcohol or use drugs.  Social History     Social History Main Topics   • Smoking status: Former Smoker     Quit date: 3/21/2013   • Smokeless tobacco: Never Used      Comment: quit 4 days ago, smoked less than 1ppd   • Alcohol use No   • Drug use: No      Comment: former cocaine 'lots of it', former meth clean 9 years   • Sexual activity: Not on file       FAMILY HISTORY  Family History   Problem Relation Age of Onset   • Heart Disease Mother    • Other Mother 72     pacemaker   • Lung Disease Father      severe asthma   • Alcohol/Drug Maternal Grandfather    • Lung Disease Paternal Grandmother          PHYSICAL EXAM  VITAL SIGNS: /71   Pulse 68   Temp 36.2 °C (97.1 °F)   Resp 18   Ht 1.829 m (6')   Wt 100.9 kg (222 lb 7.1 oz)   LMP 01/01/2006   SpO2 93%   BMI 30.17 kg/m²    Oxygen Therapy:  Pulse Oximetry: 93 %, O2 (LPM): 4, O2 Delivery:  (on demand)  Vitals:    06/06/18 1908 06/06/18 1934   BP: 152/71    Pulse: 68    Resp: 18    Temp: 36.2 °C (97.1 °F)    SpO2: " 93%    Weight:  100.9 kg (222 lb 7.1 oz)   Height:  1.829 m (6')       Constitutional:   Well developed, Well nourished, No acute distress, Non-toxic.   HENMT:  Normocephalic, Atraumatic, Bilateral external ears normal, Moist mucous membranes, Nose normal. No thyromegaly.  Eyes:  PERRL, EOMI, Conjunctiva normal, No discharge.  Neck:  Normal range of motion, No stridor.  Cardiovascular:  Normal heart rate, Normal rhythm, No murmurs, rubs or gallops. No cyanosis, clubbing or edema.  Lungs:  Respiratory effort is normal. Decreased breath sounds and some intermittent crackles in lower bases  Abdomen: Bowel sounds normal, Soft, No tenderness, No guarding, No rebound.  Skin: Warm, Dry, LLE with eczema along lateral malleolus and distal aspect of tibia  Neurologic: Alert & oriented x 4, Normal motor function, Normal sensory function, No focal deficits noted.  Psychiatric: Affect normal, Judgment normal.     Lab Data Review:  Lab Results   Component Value Date    WBC 8.0 06/06/2018    HEMOGLOBIN 13.9 06/06/2018    HEMATOCRIT 42.4 06/06/2018    PLATELETCT 195 06/06/2018    MCV 91.6 06/06/2018     Lab Results   Component Value Date    SODIUM 137 06/06/2018    POTASSIUM 4.0 06/06/2018    CHLORIDE 102 06/06/2018    CO2 26 06/06/2018    BUN 16 06/06/2018    CREATININE 0.91 06/06/2018    GLUCOSE 169 (H) 06/06/2018                Imaging/Procedures Review:    DX-CHEST-PORTABLE (1 VIEW)   Final Result      No acute cardiopulmonary abnormality.          EKG:   NSR with T wave inversion in lead V2. No ST elevation or depression    Assessment and Plan:   Lc Burns is a 57 y.o. female with a past medical history significant for oxygen dependent COPD, HTN, diabetes and dyslipidemia who presented with complaints of progressively worsening CP that began this morning. Her labs were significant for trop x 1 that was WNL and an EKG showing NSR with T wave inversion in lead V2, and no ST elevation or depression. The patient reports  "frustration and irritation regarding her ongoing chest pain and being unable to figure out its etiology.     #Chest pain  -pt reports over a 2 year hx of chest pain that began after ablation surgery for SVT in 2016  -she reports being seen by multiple cardiologists who have not been able to give her a definitive answer regarding the etiology of her pain  -the pain today was different because it did not resolve with ASA  -she described the pain as a tightness sensation beginning beneath her right clavicle radiating across chest and down her LUE.  -She states the pain is there mostly all the time and she \"feels crappy all the time.\" She denies aggravating or alleviating factors  -Holter monitor in 4/2018 showed PAC's, PVC's and short runs of atrial tachycardia.   -Echo in 11/2017 showed EF 60% with RVSP at upper limits of normal   -Cardiac stress test in 8/2017 showed mild non-specific ST depression in inferior leads, but < 1 mm.   -Trop x 1 WNL     Plan:  -Trend trop x 3  -repeat EKG x 1 and for any episodes of chest pain  -treadmill stress test in the morning  -PRN NTG 0.4 mg SL  -Continue ASA 81 mg a day  -Continue Simvastatin    Chronic Conditions:  #Oxygen Dependent COPD  -patient reports being on her baseline home O2 requirement of 3-4 L  -she denies cough, congestion and/or increasing sputum production  -exam with decreased breath sounds and some intermittent crackles in lower bases, otherwise she was CTA    Plan:  -RT per protocol  -Continue Ventolin  -Continue home O2    #DMII  -pt reports compliance with Metformin    Plan:  -pending A1C  -Hold Metformin and begin sliding scale insulin    #PAC's  #PVC's  -pt denies palpitations    Plan:  -resume Atenolol    #Dyslipidemia  -pt reports compliance with Simvastatin    Plan:  -resume Simvastatin    Core Measures:  Lines: PIV  Song: None  Abx: None  GI PPX: not indicated  DVT PPX: Lovenox   Diet: Diabetic  IVF: None  Code status: FULL CODE    Dispo: Admit to " observation unit for telemonitoring and stress test in the morning.

## 2018-06-07 NOTE — DISCHARGE INSTRUCTIONS
Chest Pain, Nonspecific  It is often hard to give a specific diagnosis for the cause of chest pain. There is always a chance that your pain could be related to something serious, like a heart attack or a blood clot in the lungs. You need to follow up with your caregiver for further evaluation. More lab tests or other studies such as X-rays, electrocardiography, stress testing, or cardiac imaging may be needed to find the cause of your pain.  Most of the time, nonspecific chest pain improves within 2 to 3 days with rest and mild pain medicine. For the next few days, avoid physical exertion or activities that bring on pain. Do not smoke. Avoid drinking alcohol. Call your caregiver for routine follow-up as advised.   SEEK IMMEDIATE MEDICAL CARE IF:  · You develop increased chest pain or pain that radiates to the arm, neck, jaw, back, or abdomen.   · You develop shortness of breath, increased coughing, or you start coughing up blood.   · You have severe back or abdominal pain, nausea, or vomiting.   · You develop severe weakness, fainting, fever, or chills.   Document Released: 12/18/2006 Document Revised: 03/11/2013 Document Reviewed: 06/06/2008  [employer]® Patient Information ©2013 [employer].

## 2018-06-07 NOTE — ED TRIAGE NOTES
"Lc Burns  57 y.o.  Chief Complaint   Patient presents with   • Chest Pain     worsening since this am, L-sided radiating to R chest, rates pain 4/10, worse with inspiration   • COPD     oxygen-dependent 3L NC at home, now increased oxygen demands with chest pain 5/10     EKG completed prior to triage per protocol.    Patient to triage via wheelchair for above. States that symptoms today are \"the worst I've felt in a long time. I'm really having a hard time breathing. I just don't feel like I'm getting enough air.\"    Medications taken today:       Aspirin 1650 mg PO       Tylenol 1000 mg PO         States that pain is unrelieved with medication per orders.    Hx. COPD with oxygen dependency, cardiac disease with last Holter 4/9/18, cardiac ablation    Cardiologist Dr. Caceres.    Charge EMI Mo notified of patient.    Triage process explained to patient, apologized for wait time, and placed in Senior Lounge.  "

## 2018-09-04 ENCOUNTER — OFFICE VISIT (OUTPATIENT)
Dept: CARDIOLOGY | Facility: MEDICAL CENTER | Age: 58
End: 2018-09-04
Payer: COMMERCIAL

## 2018-09-04 VITALS
DIASTOLIC BLOOD PRESSURE: 70 MMHG | HEIGHT: 70 IN | OXYGEN SATURATION: 90 % | HEART RATE: 70 BPM | SYSTOLIC BLOOD PRESSURE: 140 MMHG

## 2018-09-04 DIAGNOSIS — I25.10 ATHEROSCLEROSIS OF NATIVE CORONARY ARTERY OF NATIVE HEART WITHOUT ANGINA PECTORIS: ICD-10-CM

## 2018-09-04 DIAGNOSIS — Z98.890 S/P RADIOFREQUENCY ABLATION OPERATION FOR ARRHYTHMIA: ICD-10-CM

## 2018-09-04 DIAGNOSIS — I47.10 SVT (SUPRAVENTRICULAR TACHYCARDIA): ICD-10-CM

## 2018-09-04 DIAGNOSIS — Z86.79 S/P RADIOFREQUENCY ABLATION OPERATION FOR ARRHYTHMIA: ICD-10-CM

## 2018-09-04 PROBLEM — I49.3 PVC'S (PREMATURE VENTRICULAR CONTRACTIONS): Status: RESOLVED | Noted: 2018-04-09 | Resolved: 2018-09-04

## 2018-09-04 PROCEDURE — 99214 OFFICE O/P EST MOD 30 MIN: CPT | Performed by: INTERNAL MEDICINE

## 2018-09-04 RX ORDER — ATENOLOL 25 MG/1
25 TABLET ORAL DAILY
COMMUNITY
End: 2022-01-10 | Stop reason: SDUPTHER

## 2018-09-04 ASSESSMENT — ENCOUNTER SYMPTOMS
WHEEZING: 0
NERVOUS/ANXIOUS: 0
LOSS OF CONSCIOUSNESS: 0
HEARTBURN: 0
NAUSEA: 0
DEPRESSION: 0
PALPITATIONS: 1
SHORTNESS OF BREATH: 1
ABDOMINAL PAIN: 0
BRUISES/BLEEDS EASILY: 0
EYES NEGATIVE: 1
INSOMNIA: 0
DIZZINESS: 0
COUGH: 0
SINUS PAIN: 0
PND: 0
WEIGHT LOSS: 0

## 2018-09-04 NOTE — PROGRESS NOTES
Chief Complaint   Patient presents with   • Atrial Fibrillation     hospital follow up       Subjective:   Lc Burns is a 57 y.o. female who presents today in follow-up in regards to her palpitations and atrial fibrillation as well as her coronary disease with intervention in 2015 at [facility].    She is reestablishing care with me.  She was seen in the past by my partner Dr. Caceres.  She had an ablation at [facility].  She said her problems have only gotten worse since that time.    She has advanced COPD.  She has been sober for more than 8 years.  Enjoys her marriage.  Feeling very well about her mental health.    Tested for sleep apnea in the past noncompliant because mask is hard to wear    Loves riding her bike.  Loves dancing.  Compliant with meds and not interested in more testing or medications at this point    Past Medical History:   Diagnosis Date   • Arthritis current    knees, hands, elbows   • ASTHMA 2008    recurring   • Bronchitis    • Cancer (HCC) 1980s    Hodgkins lymphoma   • COPD with acute exacerbation ([facility]) 2/25/2015   • Hypertension    • Indigestion current    tx with OTC rolaids   • MI (myocardial infarction) ([facility])    • Oxygen dependent    • Persistent atrial fibrillation ([facility]) 2/9/2016   • Pneumonia    • Sepsis ([facility]) 2/25/2015     Past Surgical History:   Procedure Laterality Date   • CARDIAC CATH, RIGHT/LEFT HEART     • OTHER CARDIAC SURGERY     • WEDGE RESECTION LUNG       Family History   Problem Relation Age of Onset   • Heart Disease Mother    • Other Mother 72        pacemaker   • Lung Disease Father         severe asthma   • Alcohol/Drug Maternal Grandfather    • Lung Disease Paternal Grandmother      Social History     Social History   • Marital status:      Spouse name: N/A   • Number of children: N/A   • Years of education: N/A     Occupational History   • Not on file.     Social History Main Topics   • Smoking status: Former Smoker     Quit date:  "3/21/2013   • Smokeless tobacco: Never Used      Comment: quit 4 days ago, smoked less than 1ppd   • Alcohol use No   • Drug use: No      Comment: former cocaine 'lots of it', former meth clean 9 years   • Sexual activity: Not on file     Other Topics Concern   • Not on file     Social History Narrative   • No narrative on file     Allergies   Allergen Reactions   • Cephalexin Anaphylaxis     Rxn date: 2013.   • Clindamycin Anaphylaxis     Rxn date: 2013.   • Codeine Hives, Rash and Itching     Rxn date: \"As a child.\"   • Penicillins Hives, Rash and Itching     Rxn date: \"As a child.\"     Outpatient Encounter Prescriptions as of 9/4/2018   Medication Sig Dispense Refill   • atenolol (TENORMIN) 25 MG Tab Take 25 mg by mouth every day.     • NS SOLN 60 mL with albuterol 2.5 mg/0.5 mL NEBU 5 mL 5 mg/hr by Nebulization route.     • Skin Protectants, Misc. (EUCERIN) Cream Apply  to affected area(s) 3 times a day as needed.     • simvastatin (ZOCOR) 20 MG Tab      • aspirin (ASA) 81 MG Chew Tab chewable tablet Take 81 mg by mouth every day.     • metformin (GLUCOPHAGE) 500 MG Tab Take 500 mg by mouth 2 times a day.     • acetaminophen (TYLENOL) 500 MG Tab Take 1,000 mg by mouth 1 time daily as needed for Mild Pain or Moderate Pain.     • albuterol (VENTOLIN OR PROVENTIL) 108 (90 BASE) MCG/ACT AERS inhalation aerosol Inhale 1-2 Puffs by mouth every four hours as needed for Shortness of Breath. Indications: SOB     • [DISCONTINUED] atenolol (TENORMIN) 50 MG Tab Take 50 mg by mouth every day.       No facility-administered encounter medications on file as of 9/4/2018.      Review of Systems   Constitutional: Positive for malaise/fatigue. Negative for weight loss.   HENT: Negative for congestion, hearing loss and sinus pain.    Eyes: Negative.    Respiratory: Positive for shortness of breath. Negative for cough and wheezing.    Cardiovascular: Positive for palpitations. Negative for chest pain, leg swelling and PND. " "  Gastrointestinal: Negative for abdominal pain, heartburn and nausea.   Neurological: Negative for dizziness and loss of consciousness.   Endo/Heme/Allergies: Does not bruise/bleed easily.   Psychiatric/Behavioral: Negative for depression. The patient is not nervous/anxious and does not have insomnia.    All other systems reviewed and are negative.       Objective:   /70   Pulse 70   Ht 1.778 m (5' 10\")   LMP 01/01/2006   SpO2 90%     Physical Exam   Constitutional: She is oriented to person, place, and time. She appears well-developed.   Tall, thin, healthy-appearing no acute distress, oxygen with her but she is not wearing it   HENT:   Head: Normocephalic.   Mouth/Throat: No oropharyngeal exudate.   Eyes: Pupils are equal, round, and reactive to light. EOM are normal.   Neck: Neck supple. No JVD present.   Cardiovascular: Normal rate, regular rhythm and intact distal pulses.    No murmur heard.  Pulmonary/Chest: Breath sounds normal. No respiratory distress. She exhibits no tenderness.   Abdominal: Bowel sounds are normal. She exhibits no distension.   Musculoskeletal: She exhibits no edema or tenderness.   Lymphadenopathy:     She has no cervical adenopathy.   Neurological: She is alert and oriented to person, place, and time. She exhibits normal muscle tone. Coordination normal.   Skin: Skin is warm and dry. No rash noted.   Psychiatric: She has a normal mood and affect. Her behavior is normal.       Assessment:     1. SVT (supraventricular tachycardia) (HCC)     2. S/P radiofrequency ablation operation for arrhythmia     3. Atherosclerosis of native coronary artery of native heart without angina pectoris         Medical Decision Making:  Today's Assessment / Status / Plan:     I spent more than 45 minutes going over her extensive records and I saw her many years ago.  Atrial fibrillation  Persistent despite ablation.  Not interested in monitoring this time.  She is aware of the risk of stroke, she is " on aspirin.  We looked at her echo from last fall which is normal and reassuring  We will call her in a week to see how she is doing    Coronary disease  Normal physical exam today.  I requested records from [place].  Talked about diabetes and her neuropathy  She is not interested in tests including nuclear test or angiograms at this point.  If she is having no symptoms I did talk strongly about repeating an angiogram.  She will think about it, we will talk to her next week    Continue medications as is, talked about wellness and mental stability  RTC 6 months or as needed based on her conversation next week

## 2018-09-11 ENCOUNTER — TELEPHONE (OUTPATIENT)
Dept: CARDIOLOGY | Facility: MEDICAL CENTER | Age: 58
End: 2018-09-11

## 2018-09-11 DIAGNOSIS — Z86.79 S/P RADIOFREQUENCY ABLATION OPERATION FOR ARRHYTHMIA: ICD-10-CM

## 2018-09-11 DIAGNOSIS — Z98.890 S/P RADIOFREQUENCY ABLATION OPERATION FOR ARRHYTHMIA: ICD-10-CM

## 2018-09-11 DIAGNOSIS — R07.89 OTHER CHEST PAIN: ICD-10-CM

## 2018-09-11 DIAGNOSIS — I47.10 SVT (SUPRAVENTRICULAR TACHYCARDIA): ICD-10-CM

## 2018-09-11 NOTE — TELEPHONE ENCOUNTER
Linda Ang M.D.  Michelle Cardenas R.N.             Please see my note from last week      Upon chart review, last OV notes from Dr Ang 9/4/18:    Atrial fibrillation  Persistent despite ablation.  Not interested in monitoring this time.  She is aware of the risk of stroke, she is on aspirin.  We looked at her echo from last fall which is normal and reassuring  We will call her in a week to see how she is doing    she is having no symptoms I did talk strongly about repeating an angiogram.  She will think about it, we will talk to her next week     Called pt, pt reports for the last 48h she's been having episodes of SOB, some jaw pain and shoulder pain, she's flying to [place] in 2 days for a wedding and will be there for 4 days but she would like to proceed with Angiogram that Dr Ang discussed with her during her last OV. Informed pt that will let Dr Ang know and will give her a call back to schedule procedure. Discussed ED precautions with pt as well if symptoms worsens.     To Dr Ang

## 2018-09-11 NOTE — TELEPHONE ENCOUNTER
Please set up angiogram, ER precautions too.      Also if not ordered already would like to get a 48 hour Holter, we will have to look at her medications again if she is having more fibrillation.    thx!

## 2018-09-11 NOTE — TELEPHONE ENCOUNTER
Called pt, discussed Dr Ang recommendations, pt agreed, discussed Angiogram procedure with pt, she already had one before and she understands and would like to proceed. Informed pt that Kamilah our  will give her a call to schedule procedure. She agreed w/ Holter Monitor also. Holter Monitor ordered, transferred pt to scheduling [phone] to schedule it.     To Kamilah, please call and schedule Cardiac Cath with pt

## 2018-09-12 NOTE — TELEPHONE ENCOUNTER
Attempted to call pt to discussed above information from Kamilah, no answer, left vm to call back

## 2018-09-12 NOTE — TELEPHONE ENCOUNTER
Called patient to schedule angiogram and she wants to talk with the nurse to see if there is a less invasive test that can be done instead of angiogram. Patient said she doesn't want to scheduled and angiogram at this time. Please give patient a call to discuss.

## 2018-09-12 NOTE — TELEPHONE ENCOUNTER
Called pt, discussed Dr Ang recommendations, she would like to think about it first, she will give me a call back once she'd made a decision. Discussed importance of testing with pt if she's symptomatic, pt understands

## 2018-09-17 NOTE — TELEPHONE ENCOUNTER
Called pt to follow up if she made decision yet, pt reports she has not decided yet, she will give me a call back as soon as she made her decision

## 2018-09-18 NOTE — TELEPHONE ENCOUNTER
Pt called back, very anxious, went over with her again Stress test and Holter Monitor testing Dr Ang recommended. She would like to proceed w/ Chemical Stress test at this time, imaging scheduling phone number provided to pt to call and schedule testing.     NM-Stress test ordered

## 2018-10-03 ENCOUNTER — NON-PROVIDER VISIT (OUTPATIENT)
Dept: CARDIOLOGY | Facility: MEDICAL CENTER | Age: 58
End: 2018-10-03
Payer: COMMERCIAL

## 2018-10-03 DIAGNOSIS — I44.0 FIRST DEGREE AV BLOCK: ICD-10-CM

## 2018-10-03 DIAGNOSIS — I47.10 SVT (SUPRAVENTRICULAR TACHYCARDIA): ICD-10-CM

## 2018-10-05 ENCOUNTER — TELEPHONE (OUTPATIENT)
Dept: CARDIOLOGY | Facility: MEDICAL CENTER | Age: 58
End: 2018-10-05

## 2018-10-05 DIAGNOSIS — I47.10 SVT (SUPRAVENTRICULAR TACHYCARDIA): ICD-10-CM

## 2018-10-05 NOTE — TELEPHONE ENCOUNTER
need holter order, [place] ordered 48 hour holter, dx: svt, s/p ablation   Rula Jefferson, Med Ass't  Michelle Cardenas, R.N.     Holter Monitor ordered.

## 2018-10-08 ENCOUNTER — TELEPHONE (OUTPATIENT)
Dept: CARDIOLOGY | Facility: MEDICAL CENTER | Age: 58
End: 2018-10-08

## 2018-10-08 LAB — EKG IMPRESSION: NORMAL

## 2018-10-08 PROCEDURE — 93224 XTRNL ECG REC UP TO 48 HRS: CPT | Performed by: INTERNAL MEDICINE

## 2018-10-09 NOTE — TELEPHONE ENCOUNTER
Holter Monitor result per Dr Ang:    Interpretive Statements   *  Monitoring started at 9:40 AM and continued for 48 hours. Cardiac rhythm   is Sinus with First Degree AV Block. The average heart rate was 70 BPM.     *  Very rare and benign ectopy.  No concerning arrythmia or block.     *  Diary entries - during monitoring patient pressed event button with no   diary entries.       Electronically Signed On 10-8-2018 11:36:29 PDT by Linda Ang MD       Called pt and notified, pt verbalizes understanding

## 2018-10-17 ENCOUNTER — APPOINTMENT (OUTPATIENT)
Dept: RADIOLOGY | Facility: MEDICAL CENTER | Age: 58
End: 2018-10-17
Attending: INTERNAL MEDICINE
Payer: COMMERCIAL

## 2018-10-30 ENCOUNTER — TELEPHONE (OUTPATIENT)
Dept: HEMATOLOGY ONCOLOGY | Facility: MEDICAL CENTER | Age: 58
End: 2018-10-30

## 2018-10-30 NOTE — TELEPHONE ENCOUNTER
Spoke to patient regarding her [facility] referral, she said she has multiple things going on right now and would like to wait on this. She requested I call her back in a few months.

## 2018-11-30 ENCOUNTER — OFFICE VISIT (OUTPATIENT)
Dept: CARDIOLOGY | Facility: MEDICAL CENTER | Age: 58
End: 2018-11-30
Payer: COMMERCIAL

## 2018-11-30 VITALS
WEIGHT: 207 LBS | OXYGEN SATURATION: 93 % | SYSTOLIC BLOOD PRESSURE: 112 MMHG | BODY MASS INDEX: 29.63 KG/M2 | HEART RATE: 65 BPM | HEIGHT: 70 IN | DIASTOLIC BLOOD PRESSURE: 68 MMHG

## 2018-11-30 DIAGNOSIS — R07.2 PRECORDIAL PAIN: ICD-10-CM

## 2018-11-30 DIAGNOSIS — I25.10 ATHEROSCLEROSIS OF NATIVE CORONARY ARTERY OF NATIVE HEART WITHOUT ANGINA PECTORIS: ICD-10-CM

## 2018-11-30 DIAGNOSIS — I48.0 PAF (PAROXYSMAL ATRIAL FIBRILLATION) (HCC): Chronic | ICD-10-CM

## 2018-11-30 DIAGNOSIS — I47.10 SVT (SUPRAVENTRICULAR TACHYCARDIA): ICD-10-CM

## 2018-11-30 DIAGNOSIS — G47.33 OBSTRUCTIVE SLEEP APNEA SYNDROME: ICD-10-CM

## 2018-11-30 DIAGNOSIS — Z98.890 S/P RADIOFREQUENCY ABLATION OPERATION FOR ARRHYTHMIA: ICD-10-CM

## 2018-11-30 DIAGNOSIS — Z86.79 S/P RADIOFREQUENCY ABLATION OPERATION FOR ARRHYTHMIA: ICD-10-CM

## 2018-11-30 DIAGNOSIS — E78.5 DYSLIPIDEMIA: Chronic | ICD-10-CM

## 2018-11-30 PROCEDURE — 99214 OFFICE O/P EST MOD 30 MIN: CPT | Performed by: INTERNAL MEDICINE

## 2018-11-30 RX ORDER — ROSUVASTATIN CALCIUM 20 MG/1
20 TABLET, COATED ORAL EVERY EVENING
Qty: 90 TAB | Refills: 3 | Status: SHIPPED | OUTPATIENT
Start: 2018-11-30 | End: 2019-01-23

## 2018-11-30 ASSESSMENT — ENCOUNTER SYMPTOMS
BLURRED VISION: 0
CHILLS: 0
HEADACHES: 1
SHORTNESS OF BREATH: 1
WEAKNESS: 0
NECK PAIN: 1
PALPITATIONS: 0
BACK PAIN: 1
ABDOMINAL PAIN: 0
FOCAL WEAKNESS: 0
NAUSEA: 0
BRUISES/BLEEDS EASILY: 0
SORE THROAT: 0
FALLS: 0
FEVER: 0
COUGH: 0
MYALGIAS: 1
DIAPHORESIS: 1
HEARTBURN: 1
PND: 0
DIZZINESS: 0
CLAUDICATION: 0

## 2018-11-30 NOTE — PATIENT INSTRUCTIONS
Please work on increasing these foods in your diet to increase your potassium levels    Highest potassium foods  Dried figs, molasses, seaweed    High potassium foods  Dried fruits (dates, prunes), nuts, avocados, bran cereal, wheat germ, lima beans    Potassium-rich food  Vegetables-spinach, tomatoes, broccoli, winter squash, beets, carrots, cauliflower, potatoes    Fruits-bananas, cantaloupe, kiwis, oranges, mangoes    Meats-ground beef, steak, pork, veal, lamb    *Adapted: Cierra DANIELS. Hypokalemia. [place] Journal of Medicine 1998; 339:451.

## 2018-11-30 NOTE — LETTER
[facility]   [address]  [place] [address]  Phone: [phone]  Fax: [phone]              Lc Burns  1960    Encounter Date: 11/30/2018    Walker Adames M.D.          PROGRESS NOTE:  Chief Complaint   Patient presents with   • Palpitations       Subjective:   Lc Burns is a 58 y.o. female who presents today for follow-up of her history of coronary disease moderate on angiogram paroxysmal atrial fibrillation status post ablation likely with recurrent SVT episodes    sHe has a lot of different complaints atypical chest pains and palpitations    Past Medical History:   Diagnosis Date   • Arthritis current    knees, hands, elbows   • ASTHMA 2008    recurring   • Bronchitis    • Cancer ([facility]) 1980s    Hodgkins lymphoma   • COPD with acute exacerbation ([facility]) 2/25/2015   • Coronary artery disease due to lipid rich plaque - moderate RCA FFR negative    • Dyslipidemia    • Hypertension    • Indigestion current    tx with OTC rolaids   • MI (myocardial infarction) ([facility])    • Oxygen dependent    • PAF (paroxysmal atrial fibrillation) ([facility]) - s/p ablation 2015    • Persistent atrial fibrillation ([facility]) 2/9/2016   • Pneumonia    • Sepsis ([facility]) 2/25/2015     Past Surgical History:   Procedure Laterality Date   • CARDIAC CATH, RIGHT/LEFT HEART     • OTHER CARDIAC SURGERY     • WEDGE RESECTION LUNG       Family History   Problem Relation Age of Onset   • Heart Disease Mother    • Other Mother 72        pacemaker   • Lung Disease Father         severe asthma   • Alcohol/Drug Maternal Grandfather    • Lung Disease Paternal Grandmother      Social History     Social History   • Marital status:      Spouse name: N/A   • Number of children: N/A   • Years of education: N/A     Occupational History   • Not on file.     Social History Main Topics   • Smoking status: Former Smoker     Quit date: 3/21/2013   • Smokeless tobacco: Never Used    "Comment: quit 4 days ago, smoked less than 1ppd   • Alcohol use No   • Drug use: No      Comment: former cocaine 'lots of it', former meth clean 9 years   • Sexual activity: Not on file     Other Topics Concern   • Not on file     Social History Narrative   • No narrative on file     Allergies   Allergen Reactions   • Cephalexin Anaphylaxis     Rxn date: 2013.   • Clindamycin Anaphylaxis     Rxn date: 2013.   • Codeine Hives, Rash and Itching     Rxn date: \"As a child.\"   • Penicillins Hives, Rash and Itching     Rxn date: \"As a child.\"     Outpatient Encounter Prescriptions as of 11/30/2018   Medication Sig Dispense Refill   • rosuvastatin (CRESTOR) 20 MG Tab Take 1 Tab by mouth every evening. 90 Tab 3   • atenolol (TENORMIN) 25 MG Tab Take 25 mg by mouth every day.     • NS SOLN 60 mL with albuterol 2.5 mg/0.5 mL NEBU 5 mL 5 mg/hr by Nebulization route.     • Skin Protectants, Misc. (EUCERIN) Cream Apply  to affected area(s) 3 times a day as needed.     • aspirin (ASA) 81 MG Chew Tab chewable tablet Take 81 mg by mouth every day.     • metformin (GLUCOPHAGE) 500 MG Tab Take 500 mg by mouth 2 times a day.     • acetaminophen (TYLENOL) 500 MG Tab Take 1,000 mg by mouth 1 time daily as needed for Mild Pain or Moderate Pain.     • albuterol (VENTOLIN OR PROVENTIL) 108 (90 BASE) MCG/ACT AERS inhalation aerosol Inhale 1-2 Puffs by mouth every four hours as needed for Shortness of Breath. Indications: SOB     • [DISCONTINUED] simvastatin (ZOCOR) 20 MG Tab        No facility-administered encounter medications on file as of 11/30/2018.      Review of Systems   Constitutional: Positive for diaphoresis and malaise/fatigue. Negative for chills and fever.   HENT: Positive for tinnitus. Negative for sore throat.    Eyes: Negative for blurred vision.   Respiratory: Positive for shortness of breath. Negative for cough.    Cardiovascular: Positive for chest pain. Negative for palpitations, claudication, leg swelling and PND.   " "  Gastrointestinal: Positive for heartburn. Negative for abdominal pain and nausea.   Musculoskeletal: Positive for back pain, joint pain, myalgias and neck pain. Negative for falls.   Skin: Positive for itching and rash.   Neurological: Positive for headaches. Negative for dizziness, focal weakness and weakness.   Endo/Heme/Allergies: Does not bruise/bleed easily.        Objective:   /68 (BP Location: Left arm, Patient Position: Sitting, BP Cuff Size: Adult)   Pulse 65   Ht 1.778 m (5' 10\")   Wt 93.9 kg (207 lb)   LMP 01/01/2006   SpO2 93%   BMI 29.70 kg/m²      Physical Exam   Constitutional: No distress.   HENT:   Mouth/Throat: Oropharynx is clear and moist. No oropharyngeal exudate.   Eyes: No scleral icterus.   Neck: No JVD present.   Cardiovascular: Normal rate and normal heart sounds.  Exam reveals no gallop and no friction rub.    No murmur heard.  Pulmonary/Chest: No respiratory distress. She has no wheezes. She has no rales.   Abdominal: Soft. Bowel sounds are normal.   Musculoskeletal: She exhibits no edema.   Neurological: She is alert.   Skin: No rash noted. She is not diaphoretic.   Psychiatric: She has a normal mood and affect.     We reviewed her extensive records from [place] in the past with the moderate disease on angiogram FFR negative and then her recent Holter monitor tracing that was normal sinus rhythm    Assessment:     1. S/P radiofrequency ablation operation for arrhythmia     2. SVT (supraventricular tachycardia) ([facility])     3. Atherosclerosis of native coronary artery of native heart without angina pectoris  Treadmill Stress   4. Precordial pain  Treadmill Stress   5. PAF (paroxysmal atrial fibrillation) ([facility]) - s/p ablation 2015     6. Obstructive sleep apnea syndrome     7. Dyslipidemia  Lipid Profile       Medical Decision Making:  Today's Assessment / Status / Plan:     It was my pleasure to meet with Ms. Burns.    For atypical chest pain given the moderate disease on " angiogram I think it would be preferred to have a stress test as was ordered by Dr. Ang but the patient is unable to afford this she reports was about $5000 based on her insurance and so she is in agreement to reevaluate with a treadmill stress test which we did do last year and did not show ischemic changes so supposed of her serial testing does not show significant ischemia on EKG done that is overall reassuring again her symptoms are atypical    I will see Ms. Burns back in 6 months time and encouraged her to follow up with us over the phone or e-mail using my MyChart as issues arise.    It is my pleasure to participate in the care of Ms. Burns.  Please do not hesitate to contact me with questions or concerns.    Walker Adames MD PhD [facility]  Cardiologist [facility]        Gaetano Guardado M.D.  [address] [address]  VIA Facsimile: [phone]     Dejuan Geren M.D.  [address] [address]  VIA Facsimile: [phone]

## 2018-12-01 NOTE — PROGRESS NOTES
Chief Complaint   Patient presents with   • Palpitations       Subjective:   Lc Burns is a 58 y.o. female who presents today for follow-up of her history of coronary disease moderate on angiogram paroxysmal atrial fibrillation status post ablation likely with recurrent SVT episodes    sHe has a lot of different complaints atypical chest pains and palpitations    Past Medical History:   Diagnosis Date   • Arthritis current    knees, hands, elbows   • ASTHMA 2008    recurring   • Bronchitis    • Cancer ([facility]) 1980s    Hodgkins lymphoma   • COPD with acute exacerbation ([facility]) 2/25/2015   • Coronary artery disease due to lipid rich plaque - moderate RCA FFR negative    • Dyslipidemia    • Hypertension    • Indigestion current    tx with OTC rolaids   • MI (myocardial infarction) ([facility])    • Oxygen dependent    • PAF (paroxysmal atrial fibrillation) ([facility]) - s/p ablation 2015    • Persistent atrial fibrillation ([facility]) 2/9/2016   • Pneumonia    • Sepsis ([facility]) 2/25/2015     Past Surgical History:   Procedure Laterality Date   • CARDIAC CATH, RIGHT/LEFT HEART     • OTHER CARDIAC SURGERY     • WEDGE RESECTION LUNG       Family History   Problem Relation Age of Onset   • Heart Disease Mother    • Other Mother 72        pacemaker   • Lung Disease Father         severe asthma   • Alcohol/Drug Maternal Grandfather    • Lung Disease Paternal Grandmother      Social History     Social History   • Marital status:      Spouse name: N/A   • Number of children: N/A   • Years of education: N/A     Occupational History   • Not on file.     Social History Main Topics   • Smoking status: Former Smoker     Quit date: 3/21/2013   • Smokeless tobacco: Never Used      Comment: quit 4 days ago, smoked less than 1ppd   • Alcohol use No   • Drug use: No      Comment: former cocaine 'lots of it', former meth clean 9 years   • Sexual activity: Not on file     Other Topics Concern   • Not on file     Social History Narrative   • No  "narrative on file     Allergies   Allergen Reactions   • Cephalexin Anaphylaxis     Rxn date: 2013.   • Clindamycin Anaphylaxis     Rxn date: 2013.   • Codeine Hives, Rash and Itching     Rxn date: \"As a child.\"   • Penicillins Hives, Rash and Itching     Rxn date: \"As a child.\"     Outpatient Encounter Prescriptions as of 11/30/2018   Medication Sig Dispense Refill   • rosuvastatin (CRESTOR) 20 MG Tab Take 1 Tab by mouth every evening. 90 Tab 3   • atenolol (TENORMIN) 25 MG Tab Take 25 mg by mouth every day.     • NS SOLN 60 mL with albuterol 2.5 mg/0.5 mL NEBU 5 mL 5 mg/hr by Nebulization route.     • Skin Protectants, Misc. (EUCERIN) Cream Apply  to affected area(s) 3 times a day as needed.     • aspirin (ASA) 81 MG Chew Tab chewable tablet Take 81 mg by mouth every day.     • metformin (GLUCOPHAGE) 500 MG Tab Take 500 mg by mouth 2 times a day.     • acetaminophen (TYLENOL) 500 MG Tab Take 1,000 mg by mouth 1 time daily as needed for Mild Pain or Moderate Pain.     • albuterol (VENTOLIN OR PROVENTIL) 108 (90 BASE) MCG/ACT AERS inhalation aerosol Inhale 1-2 Puffs by mouth every four hours as needed for Shortness of Breath. Indications: SOB     • [DISCONTINUED] simvastatin (ZOCOR) 20 MG Tab        No facility-administered encounter medications on file as of 11/30/2018.      Review of Systems   Constitutional: Positive for diaphoresis and malaise/fatigue. Negative for chills and fever.   HENT: Positive for tinnitus. Negative for sore throat.    Eyes: Negative for blurred vision.   Respiratory: Positive for shortness of breath. Negative for cough.    Cardiovascular: Positive for chest pain. Negative for palpitations, claudication, leg swelling and PND.   Gastrointestinal: Positive for heartburn. Negative for abdominal pain and nausea.   Musculoskeletal: Positive for back pain, joint pain, myalgias and neck pain. Negative for falls.   Skin: Positive for itching and rash.   Neurological: Positive for headaches. " "Negative for dizziness, focal weakness and weakness.   Endo/Heme/Allergies: Does not bruise/bleed easily.        Objective:   /68 (BP Location: Left arm, Patient Position: Sitting, BP Cuff Size: Adult)   Pulse 65   Ht 1.778 m (5' 10\")   Wt 93.9 kg (207 lb)   LMP 01/01/2006   SpO2 93%   BMI 29.70 kg/m²     Physical Exam   Constitutional: No distress.   HENT:   Mouth/Throat: Oropharynx is clear and moist. No oropharyngeal exudate.   Eyes: No scleral icterus.   Neck: No JVD present.   Cardiovascular: Normal rate and normal heart sounds.  Exam reveals no gallop and no friction rub.    No murmur heard.  Pulmonary/Chest: No respiratory distress. She has no wheezes. She has no rales.   Abdominal: Soft. Bowel sounds are normal.   Musculoskeletal: She exhibits no edema.   Neurological: She is alert.   Skin: No rash noted. She is not diaphoretic.   Psychiatric: She has a normal mood and affect.     We reviewed her extensive records from [place] in the past with the moderate disease on angiogram FFR negative and then her recent Holter monitor tracing that was normal sinus rhythm    Assessment:     1. S/P radiofrequency ablation operation for arrhythmia     2. SVT (supraventricular tachycardia) ([facility])     3. Atherosclerosis of native coronary artery of native heart without angina pectoris  Treadmill Stress   4. Precordial pain  Treadmill Stress   5. PAF (paroxysmal atrial fibrillation) ([facility]) - s/p ablation 2015     6. Obstructive sleep apnea syndrome     7. Dyslipidemia  Lipid Profile       Medical Decision Making:  Today's Assessment / Status / Plan:     It was my pleasure to meet with Ms. Burns.    For atypical chest pain given the moderate disease on angiogram I think it would be preferred to have a stress test as was ordered by Dr. Ang but the patient is unable to afford this she reports was about $5000 based on her insurance and so she is in agreement to reevaluate with a treadmill stress test which we " did do last year and did not show ischemic changes so supposed of her serial testing does not show significant ischemia on EKG done that is overall reassuring again her symptoms are atypical    I will see Ms. Burns back in 6 months time and encouraged her to follow up with us over the phone or e-mail using my MyChart as issues arise.    It is my pleasure to participate in the care of Ms. Burns.  Please do not hesitate to contact me with questions or concerns.    Walker Adames MD PhD FACC  Cardiologist [facility]

## 2019-01-02 ENCOUNTER — TELEPHONE (OUTPATIENT)
Dept: CARDIOLOGY | Facility: MEDICAL CENTER | Age: 59
End: 2019-01-02

## 2019-01-02 DIAGNOSIS — E78.5 DYSLIPIDEMIA: Chronic | ICD-10-CM

## 2019-01-02 NOTE — TELEPHONE ENCOUNTER
Reviewed lab results with patient.  Encouraged patient to increase activity level to increase HDL levels.  She states she has a stationary bike she uses daily for 12-13 miles but walking has decreased due to winter weather.  Also encouraged patient to schedule 6 month follow up with MD and offered scheduling number [phone].  She states no other concerns at this time and is appreciative of information given.

## 2019-01-22 ENCOUNTER — TELEPHONE (OUTPATIENT)
Dept: SCHEDULING | Facility: IMAGING CENTER | Age: 59
End: 2019-01-22

## 2019-01-23 ENCOUNTER — OFFICE VISIT (OUTPATIENT)
Dept: CARDIOLOGY | Facility: MEDICAL CENTER | Age: 59
End: 2019-01-23
Payer: COMMERCIAL

## 2019-01-23 VITALS
DIASTOLIC BLOOD PRESSURE: 70 MMHG | OXYGEN SATURATION: 91 % | BODY MASS INDEX: 29.78 KG/M2 | SYSTOLIC BLOOD PRESSURE: 112 MMHG | WEIGHT: 208 LBS | HEIGHT: 70 IN | HEART RATE: 70 BPM

## 2019-01-23 DIAGNOSIS — Z86.79 S/P RADIOFREQUENCY ABLATION OPERATION FOR ARRHYTHMIA: ICD-10-CM

## 2019-01-23 DIAGNOSIS — I48.0 PAF (PAROXYSMAL ATRIAL FIBRILLATION) (HCC): Chronic | ICD-10-CM

## 2019-01-23 DIAGNOSIS — I25.83 CORONARY ARTERY DISEASE DUE TO LIPID RICH PLAQUE: Chronic | ICD-10-CM

## 2019-01-23 DIAGNOSIS — E78.5 DYSLIPIDEMIA: Chronic | ICD-10-CM

## 2019-01-23 DIAGNOSIS — I47.10 SVT (SUPRAVENTRICULAR TACHYCARDIA): ICD-10-CM

## 2019-01-23 DIAGNOSIS — I25.10 CORONARY ARTERY DISEASE DUE TO LIPID RICH PLAQUE: Chronic | ICD-10-CM

## 2019-01-23 DIAGNOSIS — Z98.890 S/P RADIOFREQUENCY ABLATION OPERATION FOR ARRHYTHMIA: ICD-10-CM

## 2019-01-23 PROCEDURE — 99214 OFFICE O/P EST MOD 30 MIN: CPT | Performed by: INTERNAL MEDICINE

## 2019-01-23 RX ORDER — ALBUTEROL SULFATE 0.63 MG/3ML
SOLUTION RESPIRATORY (INHALATION)
COMMUNITY
End: 2020-11-02

## 2019-01-23 RX ORDER — SIMVASTATIN 20 MG
TABLET ORAL
COMMUNITY
End: 2022-01-10 | Stop reason: SDUPTHER

## 2019-01-23 RX ORDER — ALBUTEROL SULFATE 90 UG/1
AEROSOL, METERED RESPIRATORY (INHALATION)
COMMUNITY
End: 2019-01-23

## 2019-01-23 RX ORDER — SIMVASTATIN 10 MG
TABLET ORAL
COMMUNITY
End: 2019-01-23

## 2019-01-23 ASSESSMENT — ENCOUNTER SYMPTOMS
CHILLS: 0
DIZZINESS: 0
NAUSEA: 0
SORE THROAT: 0
SHORTNESS OF BREATH: 0
FEVER: 0
CLAUDICATION: 0
PALPITATIONS: 1
BLURRED VISION: 0
FOCAL WEAKNESS: 0
COUGH: 0
PND: 0
WEAKNESS: 0
ABDOMINAL PAIN: 0
FALLS: 0
BRUISES/BLEEDS EASILY: 0

## 2019-01-23 NOTE — PATIENT INSTRUCTIONS
We discussed that you should talk with primary care (or endocrinology) about oral medication: empagliflozin (JARDIANCE®  Preferred), dapagliflozin (FARXIGA®), or Canagliflozin (Invokana®); there are cardiovascular benefits but there are also risks.  You may also want to consider liraglutide (Victoza®) which is an injection with cardiovascular benefits but also risks.

## 2019-01-23 NOTE — LETTER
[facility]   [address]  [place] [address]  Phone: [phone]  Fax: [phone]              Lc Burns  1960    Encounter Date: 1/23/2019    Walker Adames M.D.          PROGRESS NOTE:  Chief Complaint   Patient presents with   • Coronary Artery Disease       Subjective:   Lc Burns is a 58 y.o. female who presents today for follow-up of her history of coronary disease which was moderate based on angiogram in 2015 status post A. fib ablation but on long-term oxygen with O2 dependence    In the last couple weeks she has had episodes of chest pain radiating into the left arm and shoulder worsened by ambulation perhaps but also occurring somewhat at rest, previous angiogram showed moderate disease but apparently FFR negative of the RCA    She also has occasional palpitation with prior A. fib ablation but nothing sustained she is not clear if this correlates with the chest pressure    Past Medical History:   Diagnosis Date   • Arthritis current    knees, hands, elbows   • ASTHMA 2008    recurring   • Bronchitis    • Cancer ([facility]) 1980s    Hodgkins lymphoma   • COPD with acute exacerbation ([facility]) 2/25/2015   • Coronary artery disease due to lipid rich plaque - moderate RCA FFR negative    • Dyslipidemia    • Hypertension    • Indigestion current    tx with OTC rolaids   • MI (myocardial infarction) ([facility])    • Oxygen dependent    • PAF (paroxysmal atrial fibrillation) ([facility]) - s/p ablation 2015    • Persistent atrial fibrillation ([facility]) 2/9/2016   • Pneumonia    • Sepsis ([facility]) 2/25/2015     Past Surgical History:   Procedure Laterality Date   • CARDIAC CATH, RIGHT/LEFT HEART     • OTHER CARDIAC SURGERY     • WEDGE RESECTION LUNG       Family History   Problem Relation Age of Onset   • Heart Disease Mother    • Other Mother 72        pacemaker   • Lung Disease Father         severe asthma   • Alcohol/Drug Maternal Grandfather    • Lung  "Disease Paternal Grandmother      Social History     Social History   • Marital status:      Spouse name: N/A   • Number of children: N/A   • Years of education: N/A     Occupational History   • Not on file.     Social History Main Topics   • Smoking status: Former Smoker     Quit date: 3/21/2013   • Smokeless tobacco: Never Used      Comment: quit 4 days ago, smoked less than 1ppd   • Alcohol use No   • Drug use: No      Comment: former cocaine 'lots of it', former meth clean 9 years   • Sexual activity: Not on file     Other Topics Concern   • Not on file     Social History Narrative   • No narrative on file     Allergies   Allergen Reactions   • Cephalexin Anaphylaxis     Rxn date: 2013.   • Clindamycin Anaphylaxis     Rxn date: 2013.   • Keflex Anaphylaxis   • Codeine Hives, Rash and Itching     Rxn date: \"As a child.\"   • Penicillins Hives, Rash and Itching     Rxn date: \"As a child.\"   • Rosuvastatin Myalgia     Outpatient Encounter Prescriptions as of 1/23/2019   Medication Sig Dispense Refill   • simvastatin (ZOCOR) 20 MG Tab simvastatin 20 mg tablet     • albuterol (ACCUNEB) 0.63 MG/3ML nebulizer solution albuterol sulfate     • OXYGEN-HELIUM INH oxygen     • atenolol (TENORMIN) 25 MG Tab Take 25 mg by mouth every day.     • NS SOLN 60 mL with albuterol 2.5 mg/0.5 mL NEBU 5 mL 5 mg/hr by Nebulization route.     • Skin Protectants, Misc. (EUCERIN) Cream Apply  to affected area(s) 3 times a day as needed.     • aspirin (ASA) 81 MG Chew Tab chewable tablet Take 81 mg by mouth every day.     • metformin (GLUCOPHAGE) 500 MG Tab Take 500 mg by mouth 2 times a day.     • acetaminophen (TYLENOL) 500 MG Tab Take 1,000 mg by mouth 1 time daily as needed for Mild Pain or Moderate Pain.     • albuterol (VENTOLIN OR PROVENTIL) 108 (90 BASE) MCG/ACT AERS inhalation aerosol Inhale 1-2 Puffs by mouth every four hours as needed for Shortness of Breath. Indications: SOB     • [DISCONTINUED] albuterol (VENTOLIN HFA) " "108 (90 Base) MCG/ACT Aero Soln inhalation aerosol Ventolin HFA 90 mcg/actuation aerosol inhaler   Inhale 2 puffs every 4 hours by inhalation route.     • [DISCONTINUED] Atenolol (TENORMIN PO) atenolol     • [DISCONTINUED] aspirin 81 MG tablet aspirin     • [DISCONTINUED] simvastatin (ZOCOR) 10 MG Tab simvastatin     • [DISCONTINUED] METFORMIN & DIET MANAGE PROD PO metformin     • [DISCONTINUED] rosuvastatin (CRESTOR) 20 MG Tab Take 1 Tab by mouth every evening. (Patient not taking: Reported on 1/23/2019) 90 Tab 3     No facility-administered encounter medications on file as of 1/23/2019.      Review of Systems   Constitutional: Negative for chills and fever.   HENT: Negative for sore throat.    Eyes: Negative for blurred vision.   Respiratory: Negative for cough and shortness of breath.    Cardiovascular: Positive for chest pain and palpitations. Negative for claudication, leg swelling and PND.   Gastrointestinal: Negative for abdominal pain and nausea.   Musculoskeletal: Negative for falls and joint pain.   Skin: Negative for rash.   Neurological: Negative for dizziness, focal weakness and weakness.   Endo/Heme/Allergies: Does not bruise/bleed easily.        Objective:   /70 (BP Location: Left arm, Patient Position: Sitting, BP Cuff Size: Adult)   Pulse 70   Ht 1.778 m (5' 10\")   Wt 94.3 kg (208 lb)   LMP 01/01/2006   SpO2 91%   BMI 29.84 kg/m²      Physical Exam   Constitutional: No distress.   Wearing supplemental oxygen   HENT:   Mouth/Throat: Oropharynx is clear and moist. No oropharyngeal exudate.   Eyes: No scleral icterus.   Neck: No JVD present.   Cardiovascular: Normal rate and normal heart sounds.  Exam reveals no gallop and no friction rub.    No murmur heard.  Pulmonary/Chest: No respiratory distress. She has no wheezes. She has no rales.   Abdominal: Soft. Bowel sounds are normal.   Musculoskeletal: She exhibits no edema.   Neurological: She is alert.   Skin: No rash noted. She is not " diaphoretic.   Psychiatric: She has a normal mood and affect.       Assessment:     1. Coronary artery disease due to lipid rich plaque - moderate RCA FFR negative 9/2015  CARDIAC CATH   2. Dyslipidemia     3. PAF (paroxysmal atrial fibrillation) ([facility]) - s/p ablation 2015     4. S/P radiofrequency ablation operation for arrhythmia     5. SVT (supraventricular tachycardia) ([facility])         Medical Decision Making:  Today's Assessment / Status / Plan:     It was my pleasure to meet with Ms. Burns.    We discussed that you should talk with primary care (or endocrinology) about oral medication: empagliflozin (JARDIANCE®  Preferred), dapagliflozin (FARXIGA®), or Canagliflozin (Invokana®); there are cardiovascular benefits but there are also risks.  You may also want to consider liraglutide (Victoza®) which is an injection with cardiovascular benefits but also risks.    Blood pressure is well controlled.      She is on appropriate statin.    Given the recent development of worsening chest pains with radiation that are somewhat exertional dependent and her known history of moderate coronary artery disease we discussed the available workup in the past stress testing was unaffordable but given the unstable nature of the chest pains and recent worsening we discussed available options and I believe it is best just to proceed with angiogram she understands the risks and benefits of agrees to proceed    I will see Ms. Burns back in 1 month time and encouraged her to follow up with us over the phone or e-mail using my MyChart as issues arise.    It is my pleasure to participate in the care of Ms. Burns.  Please do not hesitate to contact me with questions or concerns.    Walker Adames MD PhD FAC  Cardiologist [facility]    Her care is highly complex with the complex discussion about angiogram and review of her prior records        Gaetano Guardado M.D.  [address] [address]  VIA  Facsimile: [phone]

## 2019-01-23 NOTE — PROGRESS NOTES
Chief Complaint   Patient presents with   • Coronary Artery Disease       Subjective:   Lc Burns is a 58 y.o. female who presents today for follow-up of her history of coronary disease which was moderate based on angiogram in 2015 status post A. fib ablation but on long-term oxygen with O2 dependence    In the last couple weeks she has had episodes of chest pain radiating into the left arm and shoulder worsened by ambulation perhaps but also occurring somewhat at rest, previous angiogram showed moderate disease but apparently FFR negative of the RCA    She also has occasional palpitation with prior A. fib ablation but nothing sustained she is not clear if this correlates with the chest pressure    Past Medical History:   Diagnosis Date   • Arthritis current    knees, hands, elbows   • ASTHMA 2008    recurring   • Bronchitis    • Cancer ([facility]) 1980s    Hodgkins lymphoma   • COPD with acute exacerbation ([facility]) 2/25/2015   • Coronary artery disease due to lipid rich plaque - moderate RCA FFR negative    • Dyslipidemia    • Hypertension    • Indigestion current    tx with OTC rolaids   • MI (myocardial infarction) ([facility])    • Oxygen dependent    • PAF (paroxysmal atrial fibrillation) ([facility]) - s/p ablation 2015    • Persistent atrial fibrillation ([facility]) 2/9/2016   • Pneumonia    • Sepsis ([facility]) 2/25/2015     Past Surgical History:   Procedure Laterality Date   • CARDIAC CATH, RIGHT/LEFT HEART     • OTHER CARDIAC SURGERY     • WEDGE RESECTION LUNG       Family History   Problem Relation Age of Onset   • Heart Disease Mother    • Other Mother 72        pacemaker   • Lung Disease Father         severe asthma   • Alcohol/Drug Maternal Grandfather    • Lung Disease Paternal Grandmother      Social History     Social History   • Marital status:      Spouse name: N/A   • Number of children: N/A   • Years of education: N/A     Occupational History   • Not on file.     Social History Main Topics   • Smoking status: Former  "Smoker     Quit date: 3/21/2013   • Smokeless tobacco: Never Used      Comment: quit 4 days ago, smoked less than 1ppd   • Alcohol use No   • Drug use: No      Comment: former cocaine 'lots of it', former meth clean 9 years   • Sexual activity: Not on file     Other Topics Concern   • Not on file     Social History Narrative   • No narrative on file     Allergies   Allergen Reactions   • Cephalexin Anaphylaxis     Rxn date: 2013.   • Clindamycin Anaphylaxis     Rxn date: 2013.   • Keflex Anaphylaxis   • Codeine Hives, Rash and Itching     Rxn date: \"As a child.\"   • Penicillins Hives, Rash and Itching     Rxn date: \"As a child.\"   • Rosuvastatin Myalgia     Outpatient Encounter Prescriptions as of 1/23/2019   Medication Sig Dispense Refill   • simvastatin (ZOCOR) 20 MG Tab simvastatin 20 mg tablet     • albuterol (ACCUNEB) 0.63 MG/3ML nebulizer solution albuterol sulfate     • OXYGEN-HELIUM INH oxygen     • atenolol (TENORMIN) 25 MG Tab Take 25 mg by mouth every day.     • NS SOLN 60 mL with albuterol 2.5 mg/0.5 mL NEBU 5 mL 5 mg/hr by Nebulization route.     • Skin Protectants, Misc. (EUCERIN) Cream Apply  to affected area(s) 3 times a day as needed.     • aspirin (ASA) 81 MG Chew Tab chewable tablet Take 81 mg by mouth every day.     • metformin (GLUCOPHAGE) 500 MG Tab Take 500 mg by mouth 2 times a day.     • acetaminophen (TYLENOL) 500 MG Tab Take 1,000 mg by mouth 1 time daily as needed for Mild Pain or Moderate Pain.     • albuterol (VENTOLIN OR PROVENTIL) 108 (90 BASE) MCG/ACT AERS inhalation aerosol Inhale 1-2 Puffs by mouth every four hours as needed for Shortness of Breath. Indications: SOB     • [DISCONTINUED] albuterol (VENTOLIN HFA) 108 (90 Base) MCG/ACT Aero Soln inhalation aerosol Ventolin HFA 90 mcg/actuation aerosol inhaler   Inhale 2 puffs every 4 hours by inhalation route.     • [DISCONTINUED] Atenolol (TENORMIN PO) atenolol     • [DISCONTINUED] aspirin 81 MG tablet aspirin     • [DISCONTINUED] " "simvastatin (ZOCOR) 10 MG Tab simvastatin     • [DISCONTINUED] METFORMIN & DIET MANAGE PROD PO metformin     • [DISCONTINUED] rosuvastatin (CRESTOR) 20 MG Tab Take 1 Tab by mouth every evening. (Patient not taking: Reported on 1/23/2019) 90 Tab 3     No facility-administered encounter medications on file as of 1/23/2019.      Review of Systems   Constitutional: Negative for chills and fever.   HENT: Negative for sore throat.    Eyes: Negative for blurred vision.   Respiratory: Negative for cough and shortness of breath.    Cardiovascular: Positive for chest pain and palpitations. Negative for claudication, leg swelling and PND.   Gastrointestinal: Negative for abdominal pain and nausea.   Musculoskeletal: Negative for falls and joint pain.   Skin: Negative for rash.   Neurological: Negative for dizziness, focal weakness and weakness.   Endo/Heme/Allergies: Does not bruise/bleed easily.        Objective:   /70 (BP Location: Left arm, Patient Position: Sitting, BP Cuff Size: Adult)   Pulse 70   Ht 1.778 m (5' 10\")   Wt 94.3 kg (208 lb)   LMP 01/01/2006   SpO2 91%   BMI 29.84 kg/m²     Physical Exam   Constitutional: No distress.   Wearing supplemental oxygen   HENT:   Mouth/Throat: Oropharynx is clear and moist. No oropharyngeal exudate.   Eyes: No scleral icterus.   Neck: No JVD present.   Cardiovascular: Normal rate and normal heart sounds.  Exam reveals no gallop and no friction rub.    No murmur heard.  Pulmonary/Chest: No respiratory distress. She has no wheezes. She has no rales.   Abdominal: Soft. Bowel sounds are normal.   Musculoskeletal: She exhibits no edema.   Neurological: She is alert.   Skin: No rash noted. She is not diaphoretic.   Psychiatric: She has a normal mood and affect.       Assessment:     1. Coronary artery disease due to lipid rich plaque - moderate RCA FFR negative 9/2015  CARDIAC CATH   2. Dyslipidemia     3. PAF (paroxysmal atrial fibrillation) ([facility]) - s/p ablation 2015   "   4. S/P radiofrequency ablation operation for arrhythmia     5. SVT (supraventricular tachycardia) ([facility])         Medical Decision Making:  Today's Assessment / Status / Plan:     It was my pleasure to meet with Ms. Burns.    We discussed that you should talk with primary care (or endocrinology) about oral medication: empagliflozin (JARDIANCE®  Preferred), dapagliflozin (FARXIGA®), or Canagliflozin (Invokana®); there are cardiovascular benefits but there are also risks.  You may also want to consider liraglutide (Victoza®) which is an injection with cardiovascular benefits but also risks.    Blood pressure is well controlled.      She is on appropriate statin.    Given the recent development of worsening chest pains with radiation that are somewhat exertional dependent and her known history of moderate coronary artery disease we discussed the available workup in the past stress testing was unaffordable but given the unstable nature of the chest pains and recent worsening we discussed available options and I believe it is best just to proceed with angiogram she understands the risks and benefits of agrees to proceed    I will see Ms. Burns back in 1 month time and encouraged her to follow up with us over the phone or e-mail using my MyChart as issues arise.    It is my pleasure to participate in the care of Ms. Burns.  Please do not hesitate to contact me with questions or concerns.    Walker Adames MD PhD FAC  Cardiologist [facility]    Her care is highly complex with the complex discussion about angiogram and review of her prior records

## 2019-01-24 ENCOUNTER — TELEPHONE (OUTPATIENT)
Dept: CARDIOLOGY | Facility: MEDICAL CENTER | Age: 59
End: 2019-01-24

## 2019-01-24 NOTE — TELEPHONE ENCOUNTER
Per patients request, she is scheduled on 2-27-19 for a LHC w/poss with Dr. Cleveland. Patient was told to hold metformin day of procedure and for 48hrs after. Patient to check in at  6:30 for an 8:30 procedure. Updated H&P to be done on admit by NP. Pre admit is scheduled on 2-22-19 at 8:45.

## 2019-01-25 ENCOUNTER — HOSPITAL ENCOUNTER (OUTPATIENT)
Facility: MEDICAL CENTER | Age: 59
End: 2019-01-25
Attending: INTERNAL MEDICINE | Admitting: INTERNAL MEDICINE
Payer: COMMERCIAL

## 2019-01-28 ENCOUNTER — TELEPHONE (OUTPATIENT)
Dept: CARDIOLOGY | Facility: MEDICAL CENTER | Age: 59
End: 2019-01-28

## 2019-01-28 NOTE — TELEPHONE ENCOUNTER
Per patients request, she has been rescheduled from 2-27-19 to 2-8-19 for a [facility] w/ Dr. Cleveland. New check in time is 6:00am for a 7:30 procedure. Pre admit is scheduled on 2-5-19 at 8:45.

## 2019-01-28 NOTE — TELEPHONE ENCOUNTER
"Please call patient   Received: Today Message Contents   Kamilah Nakul Lopez R.N.          Patient would like for you to call her in regards to some questions she has.      Returned patient call.  Pt states she has concerns and questions with the procedure.  Explained to patient [facility] and angiogram definition.  She states she has been at [facility] in the \"last 4 years.  I think it will be good if Dr. Cleveland can look at all my records.\"  Reassurance given to patient that this RN will request for patient's medical records.  She continued to state she has COPD, her lung was resected, and had \"electrical issues\" and had an ablation in the past.  She also had concerns about bleeding and being on Aspirin.  She states, \"they told me to continue to Aspirin throughout my procedure.  I know there's a bleeding risk with Aspirin.\"   Offered patient to be seen by APRN to have questions and concerns addressed.  She is requesting to be seen by DB at 0840 on Wednesday, 01/30/2019.  She is also requesting to have FV with MD moved to a sooner appointment since [facility] is scheduled sooner.  Reassurance given that task will be relayed to schedulers to schedule FV visits.  She states no other concerns or questions at this time and is appreciative of information given.    Task relayed to schedulers to schedule FV visits.  Fax sent to [facility], [phone], with completed status.        "

## 2019-01-30 NOTE — TELEPHONE ENCOUNTER
Received fax from [facility]/Release of Information.  Instructed to send request to [facility] (F: [phone]).      Request fax to, [phone], completed status.    Fax sent to [facility] for reference.

## 2019-01-31 ENCOUNTER — TELEPHONE (OUTPATIENT)
Dept: CARDIOLOGY | Facility: MEDICAL CENTER | Age: 59
End: 2019-01-31

## 2019-02-01 NOTE — TELEPHONE ENCOUNTER
"questions about upcoming procedure   Received: Today Message Contents   Susie Lopez R.N.          JACKIE/Marisela     Patient has a few questions about her procedure next week. She would like a call back at [phone].      Returned patient call.  Pt states she is concerned about the \"bleeding risk.  I don't understand why I'm continuing aspirin through my procedure.  I had an ablation done and it was discontinued.  I want to make sure that my records from [facility] have been received.  I would like to let the doctors know that I have electrical issues in the past.\"  Reassurance given that records have been received and scanned into her chart from [facility] and concerns will be relayed to MD for advise.  She states no other concerns or questions at this time and is appreciative of information given.    Patient concerns relayed to MD for advise.  "

## 2019-02-01 NOTE — TELEPHONE ENCOUNTER
Patient Calls  Walker Adames M.D.   You 16 hours ago (5:16 PM)     It is critical to take aspirin during the cardiac cath to prevent blood clots, angiogram is different than the ablation     Please let her know (Routing comment)      Returned patient call and reviewed MD recommendations.  She verbalizes understanding and states no other concerns or questions at this time.  Pt is appreciative of information given.

## 2019-02-05 ENCOUNTER — APPOINTMENT (OUTPATIENT)
Dept: ADMISSIONS | Facility: MEDICAL CENTER | Age: 59
End: 2019-02-05
Payer: COMMERCIAL

## 2019-02-05 NOTE — TELEPHONE ENCOUNTER
Patient called and cancelled angio for 2-8-19 due to death in the family. Patient said that she will call back to r/s when she gets back to town.

## 2019-02-15 ENCOUNTER — APPOINTMENT (OUTPATIENT)
Dept: INTERNAL MEDICINE | Facility: MEDICAL CENTER | Age: 59
End: 2019-02-15
Payer: COMMERCIAL

## 2019-02-16 NOTE — TELEPHONE ENCOUNTER
Per patients request she has been rescheduled to 2-27-19 for a C w/poss with Dr. Cleveland. Patient was told to hold metformin day of procedure and 48hrs after. Patient to check in at 6:30 for an 8:30 procedure. Updated H&P to be done on admit by NP. Pre admit is scheduled on 2-22-19 at 1:45.

## 2019-02-25 ENCOUNTER — HOSPITAL ENCOUNTER (OUTPATIENT)
Dept: RADIOLOGY | Facility: MEDICAL CENTER | Age: 59
End: 2019-02-25
Attending: INTERNAL MEDICINE | Admitting: INTERNAL MEDICINE
Payer: COMMERCIAL

## 2019-02-25 DIAGNOSIS — Z01.810 PRE-OPERATIVE CARDIOVASCULAR EXAMINATION: ICD-10-CM

## 2019-02-25 DIAGNOSIS — Z01.812 PRE-OPERATIVE LABORATORY EXAMINATION: ICD-10-CM

## 2019-02-25 DIAGNOSIS — Z01.811 PRE-OPERATIVE RESPIRATORY EXAMINATION: ICD-10-CM

## 2019-02-25 LAB
ALBUMIN SERPL BCP-MCNC: 4.3 G/DL (ref 3.2–4.9)
ALBUMIN/GLOB SERPL: 1.2 G/DL
ALP SERPL-CCNC: 74 U/L (ref 30–99)
ALT SERPL-CCNC: 22 U/L (ref 2–50)
ANION GAP SERPL CALC-SCNC: 7 MMOL/L (ref 0–11.9)
APTT PPP: 30.1 SEC (ref 24.7–36)
AST SERPL-CCNC: 15 U/L (ref 12–45)
BILIRUB SERPL-MCNC: 0.4 MG/DL (ref 0.1–1.5)
BUN SERPL-MCNC: 12 MG/DL (ref 8–22)
CALCIUM SERPL-MCNC: 9.9 MG/DL (ref 8.5–10.5)
CHLORIDE SERPL-SCNC: 98 MMOL/L (ref 96–112)
CO2 SERPL-SCNC: 31 MMOL/L (ref 20–33)
CREAT SERPL-MCNC: 0.65 MG/DL (ref 0.5–1.4)
EKG IMPRESSION: NORMAL
ERYTHROCYTE [DISTWIDTH] IN BLOOD BY AUTOMATED COUNT: 41.5 FL (ref 35.9–50)
GLOBULIN SER CALC-MCNC: 3.7 G/DL (ref 1.9–3.5)
GLUCOSE SERPL-MCNC: 220 MG/DL (ref 65–99)
HCT VFR BLD AUTO: 41.5 % (ref 37–47)
HGB BLD-MCNC: 13.5 G/DL (ref 12–16)
INR PPP: 1.01 (ref 0.87–1.13)
MCH RBC QN AUTO: 29.9 PG (ref 27–33)
MCHC RBC AUTO-ENTMCNC: 32.5 G/DL (ref 33.6–35)
MCV RBC AUTO: 91.8 FL (ref 81.4–97.8)
PLATELET # BLD AUTO: 193 K/UL (ref 164–446)
PMV BLD AUTO: 9.9 FL (ref 9–12.9)
POTASSIUM SERPL-SCNC: 3.9 MMOL/L (ref 3.6–5.5)
PROT SERPL-MCNC: 8 G/DL (ref 6–8.2)
PROTHROMBIN TIME: 13.4 SEC (ref 12–14.6)
RBC # BLD AUTO: 4.52 M/UL (ref 4.2–5.4)
SODIUM SERPL-SCNC: 136 MMOL/L (ref 135–145)
WBC # BLD AUTO: 8.3 K/UL (ref 4.8–10.8)

## 2019-02-25 PROCEDURE — 71046 X-RAY EXAM CHEST 2 VIEWS: CPT

## 2019-02-25 PROCEDURE — 85027 COMPLETE CBC AUTOMATED: CPT

## 2019-02-25 PROCEDURE — 36415 COLL VENOUS BLD VENIPUNCTURE: CPT

## 2019-02-25 PROCEDURE — 85730 THROMBOPLASTIN TIME PARTIAL: CPT

## 2019-02-25 PROCEDURE — 85610 PROTHROMBIN TIME: CPT

## 2019-02-25 PROCEDURE — 80053 COMPREHEN METABOLIC PANEL: CPT

## 2019-02-25 PROCEDURE — 93010 ELECTROCARDIOGRAM REPORT: CPT | Performed by: INTERNAL MEDICINE

## 2019-02-25 PROCEDURE — 93005 ELECTROCARDIOGRAM TRACING: CPT

## 2019-02-26 ENCOUNTER — TELEPHONE (OUTPATIENT)
Dept: CARDIOLOGY | Facility: MEDICAL CENTER | Age: 59
End: 2019-02-26

## 2019-02-26 NOTE — TELEPHONE ENCOUNTER
Called and spoke with pt regarding glucose. She states it has been in the 240s for a week now usually spiking in the afternoon. Advised pt to F/U with PCP regarding this. Pt verbalized understanding.

## 2019-02-26 NOTE — TELEPHONE ENCOUNTER
----- Message from IVANIA Lainez sent at 2/26/2019  7:51 AM PST -----      ----- Message -----  From: IVANIA Lainez  Sent: 2/25/2019   3:21 PM  To: Lacie Toro R.N.    Glucose 220 on pre-cath labs. FU with PCP and and patient on this. SC

## 2019-02-27 ENCOUNTER — HOSPITAL ENCOUNTER (OUTPATIENT)
Facility: MEDICAL CENTER | Age: 59
End: 2019-02-27
Attending: INTERNAL MEDICINE | Admitting: INTERNAL MEDICINE
Payer: COMMERCIAL

## 2019-02-27 VITALS
RESPIRATION RATE: 12 BRPM | WEIGHT: 216.71 LBS | HEIGHT: 70 IN | SYSTOLIC BLOOD PRESSURE: 148 MMHG | DIASTOLIC BLOOD PRESSURE: 76 MMHG | BODY MASS INDEX: 31.03 KG/M2 | TEMPERATURE: 97.2 F | OXYGEN SATURATION: 99 % | HEART RATE: 63 BPM

## 2019-02-27 DIAGNOSIS — Z95.5 STENTED CORONARY ARTERY: ICD-10-CM

## 2019-02-27 LAB — EKG IMPRESSION: NORMAL

## 2019-02-27 PROCEDURE — C1894 INTRO/SHEATH, NON-LASER: HCPCS

## 2019-02-27 PROCEDURE — 360979 HCHG DIAGNOSTIC CATH

## 2019-02-27 PROCEDURE — 160002 HCHG RECOVERY MINUTES (STAT)

## 2019-02-27 PROCEDURE — C1725 CATH, TRANSLUMIN NON-LASER: HCPCS

## 2019-02-27 PROCEDURE — A9270 NON-COVERED ITEM OR SERVICE: HCPCS

## 2019-02-27 PROCEDURE — 93458 L HRT ARTERY/VENTRICLE ANGIO: CPT | Mod: 26,59 | Performed by: INTERNAL MEDICINE

## 2019-02-27 PROCEDURE — 99152 MOD SED SAME PHYS/QHP 5/>YRS: CPT | Performed by: INTERNAL MEDICINE

## 2019-02-27 PROCEDURE — 304952 HCHG R 2 PADS

## 2019-02-27 PROCEDURE — 700117 HCHG RX CONTRAST REV CODE 255: Performed by: INTERNAL MEDICINE

## 2019-02-27 PROCEDURE — 307093 HCHG TR BAND RADIAL

## 2019-02-27 PROCEDURE — 99152 MOD SED SAME PHYS/QHP 5/>YRS: CPT

## 2019-02-27 PROCEDURE — C1769 GUIDE WIRE: HCPCS

## 2019-02-27 PROCEDURE — 700101 HCHG RX REV CODE 250

## 2019-02-27 PROCEDURE — 92928 PRQ TCAT PLMT NTRAC ST 1 LES: CPT | Mod: RC | Performed by: INTERNAL MEDICINE

## 2019-02-27 PROCEDURE — 93005 ELECTROCARDIOGRAM TRACING: CPT | Performed by: INTERNAL MEDICINE

## 2019-02-27 PROCEDURE — 700111 HCHG RX REV CODE 636 W/ 250 OVERRIDE (IP)

## 2019-02-27 PROCEDURE — 700102 HCHG RX REV CODE 250 W/ 637 OVERRIDE(OP)

## 2019-02-27 PROCEDURE — C1887 CATHETER, GUIDING: HCPCS

## 2019-02-27 PROCEDURE — 93458 L HRT ARTERY/VENTRICLE ANGIO: CPT

## 2019-02-27 PROCEDURE — 93010 ELECTROCARDIOGRAM REPORT: CPT | Performed by: INTERNAL MEDICINE

## 2019-02-27 PROCEDURE — 99153 MOD SED SAME PHYS/QHP EA: CPT

## 2019-02-27 PROCEDURE — C1874 STENT, COATED/COV W/DEL SYS: HCPCS

## 2019-02-27 PROCEDURE — C9600 PERC DRUG-EL COR STENT SING: HCPCS | Mod: RC

## 2019-02-27 RX ORDER — LIDOCAINE HYDROCHLORIDE 20 MG/ML
INJECTION, SOLUTION INFILTRATION; PERINEURAL
Status: COMPLETED
Start: 2019-02-27 | End: 2019-02-27

## 2019-02-27 RX ORDER — MIDAZOLAM HYDROCHLORIDE 1 MG/ML
INJECTION INTRAMUSCULAR; INTRAVENOUS
Status: COMPLETED
Start: 2019-02-27 | End: 2019-02-27

## 2019-02-27 RX ORDER — HEPARIN SODIUM,PORCINE 1000/ML
VIAL (ML) INJECTION
Status: COMPLETED
Start: 2019-02-27 | End: 2019-02-27

## 2019-02-27 RX ORDER — VERAPAMIL HYDROCHLORIDE 2.5 MG/ML
INJECTION, SOLUTION INTRAVENOUS
Status: COMPLETED
Start: 2019-02-27 | End: 2019-02-27

## 2019-02-27 RX ORDER — SODIUM CHLORIDE 9 MG/ML
INJECTION, SOLUTION INTRAVENOUS CONTINUOUS
Status: ACTIVE | OUTPATIENT
Start: 2019-02-27 | End: 2019-02-27

## 2019-02-27 RX ORDER — BIVALIRUDIN 250 MG/5ML
INJECTION, POWDER, LYOPHILIZED, FOR SOLUTION INTRAVENOUS
Status: COMPLETED
Start: 2019-02-27 | End: 2019-02-27

## 2019-02-27 RX ORDER — SODIUM CHLORIDE 9 MG/ML
INJECTION, SOLUTION INTRAVENOUS
Status: DISCONTINUED | OUTPATIENT
Start: 2019-02-27 | End: 2019-02-27

## 2019-02-27 RX ADMIN — IOHEXOL 128 ML: 350 INJECTION, SOLUTION INTRAVENOUS at 09:22

## 2019-02-27 RX ADMIN — BIVALIRUDIN 250 MG: 250 INJECTION, POWDER, LYOPHILIZED, FOR SOLUTION INTRAVENOUS at 09:13

## 2019-02-27 RX ADMIN — LIDOCAINE HYDROCHLORIDE: 20 INJECTION, SOLUTION INFILTRATION; PERINEURAL at 08:53

## 2019-02-27 RX ADMIN — HEPARIN SODIUM: 1000 INJECTION, SOLUTION INTRAVENOUS; SUBCUTANEOUS at 08:53

## 2019-02-27 RX ADMIN — HEPARIN SODIUM 2000 UNITS: 1000 INJECTION, SOLUTION INTRAVENOUS; SUBCUTANEOUS at 08:50

## 2019-02-27 RX ADMIN — FENTANYL CITRATE 100 MCG: 50 INJECTION, SOLUTION INTRAMUSCULAR; INTRAVENOUS at 08:54

## 2019-02-27 RX ADMIN — NITROGLYCERIN 10 ML: 20 INJECTION INTRAVENOUS at 08:53

## 2019-02-27 RX ADMIN — MIDAZOLAM HYDROCHLORIDE 2 MG: 1 INJECTION, SOLUTION INTRAMUSCULAR; INTRAVENOUS at 09:18

## 2019-02-27 RX ADMIN — VERAPAMIL HYDROCHLORIDE 2.5 MG: 2.5 INJECTION, SOLUTION INTRAVENOUS at 08:53

## 2019-02-27 RX ADMIN — BIVALIRUDIN 250 MG: 250 INJECTION, POWDER, LYOPHILIZED, FOR SOLUTION INTRAVENOUS at 10:04

## 2019-02-27 RX ADMIN — MIDAZOLAM HYDROCHLORIDE 2 MG: 1 INJECTION, SOLUTION INTRAMUSCULAR; INTRAVENOUS at 08:54

## 2019-02-27 RX ADMIN — TICAGRELOR 180 MG: 90 TABLET ORAL at 09:27

## 2019-02-27 ASSESSMENT — ENCOUNTER SYMPTOMS
DIZZINESS: 0
NERVOUS/ANXIOUS: 1
HEADACHES: 0
WHEEZING: 0
FALLS: 0
COUGH: 0
LOSS OF CONSCIOUSNESS: 0
ORTHOPNEA: 0
PALPITATIONS: 0
BLURRED VISION: 0
FOCAL WEAKNESS: 0
SHORTNESS OF BREATH: 1
BACK PAIN: 1
WEAKNESS: 0
DOUBLE VISION: 0

## 2019-02-27 NOTE — H&P
"History:  Primary Diagnosis: worsen chest pain      HPI:  58 years old female patient of Dr. Adames with significant history of . Recent cardiac imaging showed Coronary artery disease moderate RCA FFR negative on 9/12/2015, dyslipidemia, paroxysmal atrial fibrillation status post ablation 2015, and SVT..     Currently patient denies shortness of breath or palpitations. Patient does complain of symptoms of  Chest pain.     Medical history:   Past Medical History:   Diagnosis Date   • Arthritis current    knees, hands, elbows   • ASTHMA 2008    recurring   • Breath shortness 02/25/2019    uses oxygen at 3 liters/min cont. \"Preferred\" oxygen company   • Bronchitis    • Cancer ([facility]) 1980s    Hodgkins lymphoma   • COPD with acute exacerbation ([facility]) 2/25/2015   • Coronary artery disease due to lipid rich plaque - moderate RCA FFR negative    • Dyslipidemia    • High cholesterol 02/25/2019   • Hypertension    • Indigestion current    tx with OTC rolaids   • MI (myocardial infarction) ([facility])    • Oxygen dependent    • PAF (paroxysmal atrial fibrillation) ([facility]) - s/p ablation 2015    • Persistent atrial fibrillation ([facility]) 2/9/2016   • Pneumonia    • Sepsis ([facility]) 2/25/2015       Surgical history:   Past Surgical History:   Procedure Laterality Date   • CARDIAC CATH, RIGHT/LEFT HEART     • OTHER CARDIAC SURGERY     • WEDGE RESECTION LUNG         Social history:   History   Smoking Status   • Former Smoker   • Quit date: 3/21/2013   Smokeless Tobacco   • Never Used     Comment: quit 4 days ago, smoked less than 1ppd      History   Alcohol Use No      History   Drug Use No     Comment: former cocaine 'lots of it', former meth clean 9 years        Family history:   Family History   Problem Relation Age of Onset   • Heart Disease Mother    • Other Mother 72        pacemaker   • Lung Disease Father         severe asthma   • Alcohol/Drug Maternal Grandfather    • Lung Disease Paternal Grandmother        Allergies:   Allergies " "  Allergen Reactions   • Cephalexin Anaphylaxis     Rxn date: 2013.   • Clindamycin Anaphylaxis     Rxn date: 2013.   • Keflex Anaphylaxis   • Codeine Hives, Rash and Itching     Rxn date: \"As a child.\"   • Penicillins Hives, Rash and Itching     Rxn date: \"As a child.\"   • Rosuvastatin Myalgia       Home medications:   Current Facility-Administered Medications:   •  NS infusion, , Intravenous, PPU Continuous, Walker Adames M.D.  •  HEPARIN 1000 UNITS/ML OR USE ONLY, , , ,   •  LIDOCAINE HCL 2 % INJ SOLN, , , ,   •  HEPARIN (PORCINE) IN NACL 2-0.9 UNIT/ML-% INJ SOLN, , , ,   •  NITROGLYCERIN 2 MG IV SOLN, , , ,     Review of Systems:  Review of Systems   Constitutional: Negative for malaise/fatigue.   Eyes: Negative for blurred vision and double vision.   Respiratory: Positive for shortness of breath (on chronic oxygen ). Negative for cough and wheezing.    Cardiovascular: Positive for chest pain. Negative for palpitations, orthopnea and leg swelling.   Musculoskeletal: Positive for back pain. Negative for falls.   Neurological: Negative for dizziness, focal weakness, loss of consciousness, weakness and headaches.   Psychiatric/Behavioral: The patient is nervous/anxious.    All other systems reviewed and are negative.      Physical Examination:  Physical Exam   Constitutional: She appears well-developed and well-nourished.   Neck: No JVD present.   Cardiovascular: Normal rate, regular rhythm and normal heart sounds.    No murmur heard.  Pulmonary/Chest: Effort normal. She has decreased breath sounds.   Abdominal: Soft. Bowel sounds are normal.   Musculoskeletal: She exhibits no edema.   Neurological: She is alert.   Skin: She is not diaphoretic. No erythema.     CXR  2/25/19  No acute cardiopulmonary disease.    Plan:  Left cardiac cath with possible intervention     The risks, benefits, and alternatives to coronary angiography with IV sedation were discussed in great detail. Specific risks mentioned include " bleeding, infection, kidney damage, allergic reaction, cardiac perforation with possible tamponade requiring pericardiocentesis or possibly open heart surgery. In addition, we discussed that 10% of patients will experience small to moderate bruising at the site of the arterial puncture. Lastly, the risks of heart attack, stroke and death were discussed; the risk of major complications such as heart attack or stroke caused by the angiogram is approximately 1%; the risk of death is approximately 1 in 1000. The patient verbalized understanding of the potential complications and wishes to proceed with this procedure.    The risks/benefits of the procedure will be further discussed with the consenting physician performing the procedure.

## 2019-02-27 NOTE — PROCEDURES
DATE OF SERVICE:  02/27/2019    REFERRING PHYSICIAN:  Walker Adames MD    PROCEDURES:  1.  Left heart catheterization.  2.  Coronary angiography.  3.  PTCA/stent placement of the mid right coronary artery.  4.  Left ventriculogram.  5.  Monitoring of conscious sedation.    PREPROCEDURE DIAGNOSIS:  Chest pain with history of coronary artery disease.    POSTPROCEDURE DIAGNOSES:  1.  Essentially single-vessel coronary artery disease with high-grade mid   right coronary artery stenosis and additional nonobstructive [id number]   right coronary artery stenosis.  2.  Successful percutaneous transluminal coronary angioplasty/stent placement   of the mid right coronary artery with 3.0x18 mm Xience Stephanie drug-eluting stent.  3.  Normal left ventricular systolic function with ejection fraction of 55%.  4.  Elevated left ventricular end-diastolic pressure.    INDICATION:  The patient is a 58-year-old patient with past medical history   significant for coronary artery disease with prior cardiac catheterization in   2015 with identified mid right coronary artery lesion with negative fractional   flow reserve, atrial fibrillation with history of ablation on no   anticoagulation.  The patient has continued to experience chest pain since her   angiogram.  Due to her persistent chest pain, she was rescheduled for cardiac   catheterization.    DESCRIPTION OF PROCEDURE:  After informed consent was signed by the patient,   the patient was brought to the cardiac catheterization laboratory.  She was   prepped and draped in the usual sterile manner.  The right wrist area was   anesthetized with 2% Xylocaine.  A 6-Tajik sheath was inserted into the right   radial artery using modified Seldinger technique under ultrasound guidance.    Intra-arterial verapamil and nitroglycerin were given.  IV heparin was given.    A 4-Tajik pigtail catheter was positioned into the left ventricle.  Left   angiography was performed.  This was  exchanged for a 4-Portuguese JL 3.5 catheter,   which was positioned into the left main coronary artery.  Coronary   angiography was performed.  This was exchanged for a 4-Portuguese JR4 catheter,   which was positioned into right coronary artery.  Coronary angiography was   performed.  IV Angiomax was started.  This catheter was exchanged for a JR4   guide catheter, which was positioned into the right coronary artery.  A   Prowater wire was used to cross the identified stenosis.  The stenosis was   predilated with 2.5x15 mm Emerge balloon.  A 3.0x18 mm Xience Stephanie   drug-eluting stent was successfully positioned and deployed.  The stent was   postdilated with 3.0x15 mm NC TREK balloon.  The patient tolerated the   procedure well.  At the end of procedure, all wires, balloons, guide, and   sheaths were removed.  Hemoband was placed on the right wrist.  She was given   oral Brilinta and transferred to PPU to telemetry in stable condition.    HEMODYNAMIC DATA:  Hemodynamic data shows aortic pressures of 140/70 with mean   of 100 mmHg and /10 with LVEDP of 20 mmHg.    AORTIC VALVE:  There was no significant gradient noted.    LEFT VENTRICULOGRAM:  A 10 mL of contrast were delivered for 3 seconds.    Ejection fraction was estimated to be 55%.  There was no segmental wall motion   abnormalities noted.    ANGIOGRAM:  Left main coronary:  Left main coronary artery is a long, large caliber vessel   free of disease.  Left anterior descending artery:  Left anterior descending artery is a long,   large caliber vessel free of disease.  There is a moderate caliber diagonal   branch noted.  Left anterior descending artery and diagonal branch are free of   disease.  Left circumflex artery:  Left circumflex artery is a nondominant, large   caliber vessel with bifurcating first obtuse marginal branch.  Small branch   coming off of the RV free wall branch.  Distally, there is a large bifurcating   obtuse marginal branch noted.  Right  coronary artery:  Right coronary artery is a dominant large caliber   vessel with mid concentric 80% stenosis.  Mid to distal portion, there was a   concentric 50% stenosis.  Distally, there is a long, moderate caliber   posterior descending artery and short bifurcating posterior lateral branch   noted free of disease.    PERCUTANEOUS INTERVENTION:  Mid right coronary artery concentric 80% stenosis   with 0% residual.  Predilatation with 2.5x15 mm Emerge balloon.  Stent with   3.0x18 mm Xience Stephanie drug-eluting stent, postdilatation with 3.0x15 mm NC   TREK balloon.    IMPRESSION:  1.  Essentially single-vessel coronary artery disease with high-grade mid   right coronary artery stenosis and additional nonobstructive [id number]   right coronary artery stenosis.  2.  Successful percutaneous transluminal coronary angioplasty/stent placement   of the mid right coronary artery with 3.0x18 mm Xience Stephanie drug-eluting stent.  3.  Normal left ventricular systolic function with ejection fraction of 55%.  4.  Elevated left ventricular end-diastolic pressure.    RECOMMENDATIONS:  Recommend medical therapy in addition of Brilinta.  She qualifies for same day discharge. Her and her  agrees with plan.    SEDATION: The patient's sedation was managed by myself with continuous   face to face time with the patient for 15 minutes from 08:28 to 08:43.       ____________________________________     MD PREETHI LANGSTON / FRANCA    DD:  02/27/2019 09:29:19  DT:  02/27/2019 09:43:05    D#:  [id number]  Job#:  [id number]

## 2019-02-27 NOTE — PROGRESS NOTES
[facility]    Delivered Brilinta 90 mg BID #60 to pt.  Rx came to $5 with coupon card; collected payment.  Provided education and answered pt questions.    Timmy Andujar, PharmD

## 2019-02-27 NOTE — OR NURSING
0951: Patient from cath lab to PPU via [facility] s/p L hear cath with stent placement. Patient is awake. VSS. RUE CMS intact. R TR band intact. Vascular access care management per MD order (see assessment). No c/o pain or nausea at this time. Will monitor closely. Vital signs per MD order. Angiomax gtt infusing started in cath lab per MD order (see MAR for rate).   1005: VSS. R radial TRband intact. Family at bedside. Post procedure care explained. Patient is on home 3 LNC.   1015: EKG done at bedside.   1030: Patient ambulated to and from the bathroom without a problem. Angiomax gtt at 3.9 ml/hr X 4 hours.   1130: VSS. R radial site intact. Patient tolerated PO intake well.   1215: Air completely off R radial hemoband per MD order. Gauze and tegaderm dressing to R radial site.  RUE circulation, movement and sensation intact.   1300: Rep from Meds to Beds at bedside.   1430: VSS. R radial TR band intact. Angiomax gtt DC'd.   1445: Redet APN at bedside to update patient.   1500: DC instructions given. Questions answered. Family at bedside. R radial site soft,CDI. No c/o pain or nausea. Patient wide awake. VSS. Patient met criteria for discharge.

## 2019-02-27 NOTE — DISCHARGE INSTRUCTIONS
ACTIVITY: Rest and take it easy for the first 24 hours.  A responsible adult is recommended to remain with you during that time.  It is normal to feel sleepy.  We encourage you to not do anything that requires balance, judgment or coordination.    MILD FLU-LIKE SYMPTOMS ARE NORMAL. YOU MAY EXPERIENCE GENERALIZED MUSCLE ACHES, THROAT IRRITATION, HEADACHE AND/OR SOME NAUSEA.    FOR 24 HOURS DO NOT:  Drive, operate machinery or run household appliances.  Drink beer or alcoholic beverages.   Make important decisions or sign legal documents.      DIET: To avoid nausea, slowly advance diet as tolerated, avoiding spicy or greasy foods for the first day.  Add more substantial food to your diet according to your physician's instructions.  Babies can be fed formula or breast milk as soon as they are hungry.  INCREASE FLUIDS AND FIBER TO AVOID CONSTIPATION.    SURGICAL DRESSING/BATHING:     Keep the dressing clean and dry for 24 hours.  May remove dressing tomorrow  and can shower.  Do not submerge site under water for 1 week.  No heavy lifting and limit movement for 2 days.      FOLLOW-UP APPOINTMENT:  A follow-up appointment should be arranged with your doctor *; call to schedule.    You should CALL YOUR PHYSICIAN if you develop:  Fever greater than 101 degrees F.  Pain not relieved by medication, or persistent nausea or vomiting.  Excessive bleeding (blood soaking through dressing) or unexpected drainage from the wound.  Extreme redness or swelling around the incision site, drainage of pus or foul smelling drainage.  Inability to urinate or empty your bladder within 8 hours.  Problems with breathing or chest pain.    You should call 911 if you develop problems with breathing or chest pain.  If you are unable to contact your doctor or surgical center, you should go to the nearest emergency room or urgent care center.      Physician's telephone #: [phone]    If any questions arise, call your doctor.  If your doctor is not  available, please feel free to call the Surgical Center at [phone].  The Center is open Monday through Friday from 7AM to 7PM.  You can also call the HEALTH HOTLINE open 24 hours/day, 7 days/week and speak to a nurse at [phone], or toll free at [phone].    A registered nurse may call you a few days after your surgery to see how you are doing after your procedure.    MEDICATIONS: Resume taking daily medication.  Take prescribed pain medication with food.  If no medication is prescribed, you may take non-aspirin pain medication if needed.  PAIN MEDICATION CAN BE VERY CONSTIPATING.  Take a stool softener or laxative such as senokot, pericolace, or milk of magnesia if needed.      If your physician has prescribed pain medication that includes Acetaminophen (Tylenol), do not take additional Acetaminophen (Tylenol) while taking the prescribed medication.    Depression / Suicide Risk    As you are discharged from this [facility] facility, it is important to learn how to keep safe from harming yourself.    Recognize the warning signs:  · Abrupt changes in personality, positive or negative- including increase in energy   · Giving away possessions  · Change in eating patterns- significant weight changes-  positive or negative  · Change in sleeping patterns- unable to sleep or sleeping all the time   · Unwillingness or inability to communicate  · Depression  · Unusual sadness, discouragement and loneliness  · Talk of wanting to die  · Neglect of personal appearance   · Rebelliousness- reckless behavior  · Withdrawal from people/activities they love  · Confusion- inability to concentrate     If you or a loved one observes any of these behaviors or has concerns about self-harm, here's what you can do:  · Talk about it- your feelings and reasons for harming yourself  · Remove any means that you might use to hurt yourself (examples: pills, rope, extension cords, firearm)  · Get professional help from the  community (Mental Health, Substance Abuse, psychological counseling)  · Do not be alone:Call your Safe Contact- someone whom you trust who will be there for you.  · Call your local CRISIS HOTLINE 144-5674 or 432-473-3251  · Call your local Children's Mobile Crisis Response Team [place] (541) 787-5424 or www.[employer]  · Call the toll free National Suicide Prevention Hotlines   · National Suicide Prevention Lifeline 324-369-KDNE (0363)  · [facility] Line Network 800-SUICIDE (072-5551)                    Radial Catherization Discharge Instructions      · Do not subject hand/arm to any forceful movements for 24 hours    i.e. supporting weight when rising from the chair or bed.   · Do not drive a car for 24 hours  · You may remove the dressing tomorrow  · You may shower on the day following your procedure.  Do not take a tub bath or submerge the puncture site in water for 3 days following the procedure.  · No Lifting more than 3-5 pounds with affected wrist for 5 days  · Follow up with  in  2-4 weeks.  · Increase fluids for 2 days post procedure.  · Continue all previous medications unless otherwise instructed.    If bleeding should occur following discharge:  · Sit down and apply firm pressure to site with your fingers for 10 minutes  · If the bleeding stops, continue to sit quietly, keeping your wrist straight for 2 hours.  Notify physician as soon as possible ( [phone])  · If bleeding does not stop after 10 minutes, or if there is a large amount of bleeding or spurting, call 911 immediately.  Do not drive yourself to the hospital.

## 2019-02-28 ENCOUNTER — TELEPHONE (OUTPATIENT)
Dept: CARDIOLOGY | Facility: MEDICAL CENTER | Age: 59
End: 2019-02-28

## 2019-02-28 NOTE — TELEPHONE ENCOUNTER
"This patient was a same day discharge post PCI and needs a follow up call today.     Overall, pt states she is doing well but has some chest discomfort she seems to be anxious about. Pt reports 2-3/10 constant cp since this morning. No cp at discharge yesterday. Reports symptoms are similar to cp prior to stent. Angina not worse or better with rest of exertion. Somewhat anxious about her stents \"being blocked\" with the mild angina now present. She also says she is \"very SOB\" but understanding that this is probably from Brilinta. She has been taking the Brilinta as prescribed. On continuous flow O2 at 2.5-3L with saturations around 93%. This is her usual flow rate but says she can normally be on room air for a few hours a day but says she can't tolerate any room air currently due to SOB. Discussed that this should resolve over time.     BP and HR: 135/70, HR low this morning 55 with some dizziness and lightheadedness about 9:30-10am. HR 66 bpm currently during phone conversation.     Incision site: pt has removed dressing and states it looks great. Denies any concerning symptoms.        To JI and RT    "

## 2019-03-01 NOTE — TELEPHONE ENCOUNTER
Certainly it could be the Brilinta so she may have to switch to Effient, we can discuss in follow-up or if she would like she can start Effient    Her angiogram went perfectly with great result so the artery should be fine I suspect it is more related to a possible side effect from Brilinta    Please let her know    Thank you

## 2019-03-01 NOTE — TELEPHONE ENCOUNTER
"S/w Dr. Cleveland and explained the details of our call. He says that if pts chest pain persist until weekend or worsens at any time should go to ER and may need to be re-cathed. If improves, keep appt 3/05.    Pt notified of the above. She again sounded anxious about this. Pt asked several \"what if\" questions and asked if the doctor looked at \"all of her arteries\" during the procedure. Reassured pt that this is always done and answered questions if able. Pt repeated instructions on going to ER if worsening or persists. Pt again, seemed very anxious but appreciated call.         "

## 2019-03-04 NOTE — TELEPHONE ENCOUNTER
Spoke w/pt regarding SOB she states that it has not worsened but it hasn't improved either. Let her know about CW recommendations and she states she will discuss with him tomorrow during her FV.

## 2019-03-05 ENCOUNTER — OFFICE VISIT (OUTPATIENT)
Dept: CARDIOLOGY | Facility: MEDICAL CENTER | Age: 59
End: 2019-03-05
Payer: COMMERCIAL

## 2019-03-05 VITALS
WEIGHT: 208 LBS | SYSTOLIC BLOOD PRESSURE: 130 MMHG | DIASTOLIC BLOOD PRESSURE: 74 MMHG | BODY MASS INDEX: 29.78 KG/M2 | OXYGEN SATURATION: 96 % | HEIGHT: 70 IN | HEART RATE: 74 BPM

## 2019-03-05 DIAGNOSIS — I25.10 CORONARY ARTERY DISEASE DUE TO LIPID RICH PLAQUE: Chronic | ICD-10-CM

## 2019-03-05 DIAGNOSIS — Z86.79 S/P RADIOFREQUENCY ABLATION OPERATION FOR ARRHYTHMIA: ICD-10-CM

## 2019-03-05 DIAGNOSIS — I48.0 PAF (PAROXYSMAL ATRIAL FIBRILLATION) (HCC): Chronic | ICD-10-CM

## 2019-03-05 DIAGNOSIS — I47.10 SVT (SUPRAVENTRICULAR TACHYCARDIA): ICD-10-CM

## 2019-03-05 DIAGNOSIS — I25.83 CORONARY ARTERY DISEASE DUE TO LIPID RICH PLAQUE: Chronic | ICD-10-CM

## 2019-03-05 DIAGNOSIS — Z98.890 S/P RADIOFREQUENCY ABLATION OPERATION FOR ARRHYTHMIA: ICD-10-CM

## 2019-03-05 DIAGNOSIS — E78.5 DYSLIPIDEMIA: Chronic | ICD-10-CM

## 2019-03-05 DIAGNOSIS — Z95.5 PRESENCE OF DRUG COATED STENT IN RIGHT CORONARY ARTERY: Chronic | ICD-10-CM

## 2019-03-05 PROCEDURE — 99214 OFFICE O/P EST MOD 30 MIN: CPT | Performed by: INTERNAL MEDICINE

## 2019-03-05 RX ORDER — CLOPIDOGREL BISULFATE 75 MG/1
75 TABLET ORAL DAILY
Qty: 90 TAB | Refills: 3 | Status: SHIPPED | OUTPATIENT
Start: 2019-03-05 | End: 2019-11-20

## 2019-03-05 NOTE — LETTER
"     [facility]   [address]  [place] [address]  Phone: [phone]  Fax: [phone]              Lc Briggs  1960    Encounter Date: 3/5/2019    Walker Adames M.D.          PROGRESS NOTE:  Chief Complaint   Patient presents with   • Coronary Artery Disease       Subjective:   Lc Briggs is a 58 y.o. female who presents today for follow-up of her recent cardiac catheterization where she was found to have progressive stenosis of RCA and underwent successful stenting    Unfortunately she had significant dyspnea on the Brilinta    She is still concerned as she has ongoing chest discomfort    Past Medical History:   Diagnosis Date   • Arthritis current    knees, hands, elbows   • ASTHMA 2008    recurring   • Breath shortness 02/25/2019    uses oxygen at 3 liters/min cont. \"Preferred\" oxygen company   • Bronchitis    • Cancer ([facility]) 1980s    Hodgkins lymphoma   • COPD with acute exacerbation ([facility]) 2/25/2015   • Coronary artery disease due to lipid rich plaque - moderate RCA FFR negative    • Dyslipidemia    • High cholesterol 02/25/2019   • Hypertension    • Indigestion current    tx with OTC rolaids   • MI (myocardial infarction) ([facility])    • Oxygen dependent    • PAF (paroxysmal atrial fibrillation) ([facility]) - s/p ablation 2015    • Persistent atrial fibrillation ([facility]) 2/9/2016   • Pneumonia    • Sepsis ([facility]) 2/25/2015     Past Surgical History:   Procedure Laterality Date   • CARDIAC CATH, RIGHT/LEFT HEART     • OTHER CARDIAC SURGERY     • WEDGE RESECTION LUNG       Family History   Problem Relation Age of Onset   • Heart Disease Mother    • Other Mother 72        pacemaker   • Lung Disease Father         severe asthma   • Alcohol/Drug Maternal Grandfather    • Lung Disease Paternal Grandmother      Social History     Social History   • Marital status:      Spouse name: N/A   • Number of children: N/A   • Years " "of education: N/A     Occupational History   • Not on file.     Social History Main Topics   • Smoking status: Former Smoker     Quit date: 3/21/2013   • Smokeless tobacco: Never Used      Comment: quit 4 days ago, smoked less than 1ppd   • Alcohol use No   • Drug use: No      Comment: former cocaine 'lots of it', former meth clean 9 years   • Sexual activity: Not on file     Other Topics Concern   • Not on file     Social History Narrative   • No narrative on file     Allergies   Allergen Reactions   • Cephalexin Anaphylaxis     Rxn date: 2013.   • Clindamycin Anaphylaxis     Rxn date: 2013.   • Keflex Anaphylaxis   • Codeine Hives, Rash and Itching     Rxn date: \"As a child.\"   • Penicillins Hives, Rash and Itching     Rxn date: \"As a child.\"   • Rosuvastatin Myalgia     Outpatient Encounter Prescriptions as of 3/5/2019   Medication Sig Dispense Refill   • clopidogrel (PLAVIX) 75 MG Tab Take 1 Tab by mouth every day. 90 Tab 3   • metFORMIN (GLUCOPHAGE) 500 MG Tab Take 1 Tab by mouth 2 times a day. 60 Tab 0   • simvastatin (ZOCOR) 20 MG Tab simvastatin 20 mg tablet     • albuterol (ACCUNEB) 0.63 MG/3ML nebulizer solution albuterol sulfate     • OXYGEN-HELIUM INH oxygen     • atenolol (TENORMIN) 25 MG Tab Take 25 mg by mouth every day.     • aspirin (ASA) 81 MG Chew Tab chewable tablet Take 81 mg by mouth every day.     • albuterol (VENTOLIN OR PROVENTIL) 108 (90 BASE) MCG/ACT AERS inhalation aerosol Inhale 1-2 Puffs by mouth every four hours as needed for Shortness of Breath. Indications: SOB     • [DISCONTINUED] ticagrelor (BRILINTA) 90 MG Tab tablet Take 1 Tab by mouth 2 Times a Day. 60 Tab 11   • [DISCONTINUED] ticagrelor (BRILINTA) 90 MG Tab tablet Take 1 Tab by mouth 2 Times a Day. (Patient not taking: Reported on 3/5/2019) 60 Tab 0     No facility-administered encounter medications on file as of 3/5/2019.      Review of Systems   Constitutional: Positive for malaise/fatigue. Negative for chills and fever.   " "  HENT: Negative for sore throat.    Eyes: Negative for blurred vision.   Respiratory: Positive for shortness of breath. Negative for cough.    Cardiovascular: Positive for chest pain. Negative for palpitations, claudication, leg swelling and PND.   Gastrointestinal: Negative for abdominal pain and nausea.   Musculoskeletal: Negative for falls and joint pain.   Skin: Negative for rash.   Neurological: Negative for dizziness, focal weakness and weakness.   Endo/Heme/Allergies: Does not bruise/bleed easily.        Objective:   /74 (BP Location: Left arm, Patient Position: Sitting, BP Cuff Size: Adult)   Pulse 74   Ht 1.778 m (5' 10\")   Wt 94.3 kg (208 lb)   LMP 01/01/2006   SpO2 96%   BMI 29.84 kg/m²      Physical Exam   Constitutional: No distress.   HENT:   Mouth/Throat: Oropharynx is clear and moist. No oropharyngeal exudate.   Eyes: No scleral icterus.   Neck: No JVD present.   Cardiovascular: Normal rate and normal heart sounds.  Exam reveals no gallop and no friction rub.    No murmur heard.  Pulmonary/Chest: No respiratory distress. She has no wheezes. She has no rales.   Abdominal: Soft. Bowel sounds are normal.   Musculoskeletal: She exhibits no edema.   Neurological: She is alert.   Skin: No rash noted. She is not diaphoretic.   Psychiatric: She has a normal mood and affect.       Assessment:     1. Coronary artery disease due to lipid rich plaque - moderate RCA FFR negative 9/2015  DX-CHEST-2 VIEWS   2. Dyslipidemia     3. PAF (paroxysmal atrial fibrillation) ([facility]) - s/p ablation 2015     4. S/P radiofrequency ablation operation for arrhythmia     5. SVT (supraventricular tachycardia) ([facility])     6. Presence of drug coated stent in right coronary artery  REFERRAL TO INTENSIVE CARDIAC REHAB/CARDIAC REHAB       Medical Decision Making:  Today's Assessment / Status / Plan:     It was my pleasure to meet with Ms. Grant Briggs.    Blood pressure is well controlled.      She is on appropriate " statin.    We specifically discussed and she understands the importance of dual antiplatelet therapy as well as the risk.  We will switch her to Plavix given the concern for dyspnea with the Brilinta this should improve over the next 2 days    I will see Ms. Grant Briggs back in 1 month time and encouraged her to follow up with us over the phone or e-mail using my MyChart as issues arise.    It is my pleasure to participate in the care of Ms. Grant Briggs.  Please do not hesitate to contact me with questions or concerns.    Walker Adames MD PhD [facility]  Cardiologist [facility]    Please note that this dictation was created using voice recognition software. I have worked with consultants from the vendor as well as technical experts from [facility] to optimize the interface. I have made every reasonable attempt to correct obvious errors, but I expect that there are errors of grammar and possibly content I did not discover before finalizing the note.           Gaetano Guardado M.D.  [address] [place] [id number]  VIA Facsimile: [phone]

## 2019-03-06 ENCOUNTER — TELEPHONE (OUTPATIENT)
Dept: CARDIOLOGY | Facility: MEDICAL CENTER | Age: 59
End: 2019-03-06

## 2019-03-06 ENCOUNTER — APPOINTMENT (OUTPATIENT)
Dept: RADIOLOGY | Facility: MEDICAL CENTER | Age: 59
End: 2019-03-06
Attending: INTERNAL MEDICINE
Payer: COMMERCIAL

## 2019-03-07 NOTE — TELEPHONE ENCOUNTER
"clopidogrel instruction review   Received: Today Message Contents   Kristie Lopez R.N.   Phone Number: [phone]          JACKIE/thierry     Pt calling for review of clopidogrel dosing instructions. Please call pt at [phone]      Per MD instructions, take 400mg loading dose today and 75mg thereafter.  Pt verbalizes understanding.  Pt states she would like MD to know that she \"decided against cxr because of financial issues financial issues.\"  Reassurance given to pt that update will be relayed to MD.  She states no other concerns or questions at this time and is appreciative of information given.    Pt update relayed to MD.  "

## 2019-03-20 ASSESSMENT — ENCOUNTER SYMPTOMS
COUGH: 0
FALLS: 0
SORE THROAT: 0
CHILLS: 0
DIZZINESS: 0
ABDOMINAL PAIN: 0
WEAKNESS: 0
FOCAL WEAKNESS: 0
NAUSEA: 0
BLURRED VISION: 0
PND: 0
CLAUDICATION: 0
PALPITATIONS: 0
SHORTNESS OF BREATH: 1
BRUISES/BLEEDS EASILY: 0
FEVER: 0

## 2019-03-21 NOTE — PROGRESS NOTES
"Chief Complaint   Patient presents with   • Coronary Artery Disease       Subjective:   Lc Briggs is a 58 y.o. female who presents today for follow-up of her recent cardiac catheterization where she was found to have progressive stenosis of RCA and underwent successful stenting    Unfortunately she had significant dyspnea on the Brilinta    She is still concerned as she has ongoing chest discomfort    Past Medical History:   Diagnosis Date   • Arthritis current    knees, hands, elbows   • ASTHMA 2008    recurring   • Breath shortness 02/25/2019    uses oxygen at 3 liters/min cont. \"Preferred\" oxygen company   • Bronchitis    • Cancer ([facility]) 1980s    Hodgkins lymphoma   • COPD with acute exacerbation ([facility]) 2/25/2015   • Coronary artery disease due to lipid rich plaque - moderate RCA FFR negative    • Dyslipidemia    • High cholesterol 02/25/2019   • Hypertension    • Indigestion current    tx with OTC rolaids   • MI (myocardial infarction) ([facility])    • Oxygen dependent    • PAF (paroxysmal atrial fibrillation) ([facility]) - s/p ablation 2015    • Persistent atrial fibrillation ([facility]) 2/9/2016   • Pneumonia    • Sepsis ([facility]) 2/25/2015     Past Surgical History:   Procedure Laterality Date   • CARDIAC CATH, RIGHT/LEFT HEART     • OTHER CARDIAC SURGERY     • WEDGE RESECTION LUNG       Family History   Problem Relation Age of Onset   • Heart Disease Mother    • Other Mother 72        pacemaker   • Lung Disease Father         severe asthma   • Alcohol/Drug Maternal Grandfather    • Lung Disease Paternal Grandmother      Social History     Social History   • Marital status:      Spouse name: N/A   • Number of children: N/A   • Years of education: N/A     Occupational History   • Not on file.     Social History Main Topics   • Smoking status: Former Smoker     Quit date: 3/21/2013   • Smokeless tobacco: Never Used      Comment: quit 4 days ago, smoked less than 1ppd   • Alcohol use No   • Drug use: No      " "Comment: former cocaine 'lots of it', former meth clean 9 years   • Sexual activity: Not on file     Other Topics Concern   • Not on file     Social History Narrative   • No narrative on file     Allergies   Allergen Reactions   • Cephalexin Anaphylaxis     Rxn date: 2013.   • Clindamycin Anaphylaxis     Rxn date: 2013.   • Keflex Anaphylaxis   • Codeine Hives, Rash and Itching     Rxn date: \"As a child.\"   • Penicillins Hives, Rash and Itching     Rxn date: \"As a child.\"   • Rosuvastatin Myalgia     Outpatient Encounter Prescriptions as of 3/5/2019   Medication Sig Dispense Refill   • clopidogrel (PLAVIX) 75 MG Tab Take 1 Tab by mouth every day. 90 Tab 3   • metFORMIN (GLUCOPHAGE) 500 MG Tab Take 1 Tab by mouth 2 times a day. 60 Tab 0   • simvastatin (ZOCOR) 20 MG Tab simvastatin 20 mg tablet     • albuterol (ACCUNEB) 0.63 MG/3ML nebulizer solution albuterol sulfate     • OXYGEN-HELIUM INH oxygen     • atenolol (TENORMIN) 25 MG Tab Take 25 mg by mouth every day.     • aspirin (ASA) 81 MG Chew Tab chewable tablet Take 81 mg by mouth every day.     • albuterol (VENTOLIN OR PROVENTIL) 108 (90 BASE) MCG/ACT AERS inhalation aerosol Inhale 1-2 Puffs by mouth every four hours as needed for Shortness of Breath. Indications: SOB     • [DISCONTINUED] ticagrelor (BRILINTA) 90 MG Tab tablet Take 1 Tab by mouth 2 Times a Day. 60 Tab 11   • [DISCONTINUED] ticagrelor (BRILINTA) 90 MG Tab tablet Take 1 Tab by mouth 2 Times a Day. (Patient not taking: Reported on 3/5/2019) 60 Tab 0     No facility-administered encounter medications on file as of 3/5/2019.      Review of Systems   Constitutional: Positive for malaise/fatigue. Negative for chills and fever.   HENT: Negative for sore throat.    Eyes: Negative for blurred vision.   Respiratory: Positive for shortness of breath. Negative for cough.    Cardiovascular: Positive for chest pain. Negative for palpitations, claudication, leg swelling and PND.   Gastrointestinal: Negative for " "abdominal pain and nausea.   Musculoskeletal: Negative for falls and joint pain.   Skin: Negative for rash.   Neurological: Negative for dizziness, focal weakness and weakness.   Endo/Heme/Allergies: Does not bruise/bleed easily.        Objective:   /74 (BP Location: Left arm, Patient Position: Sitting, BP Cuff Size: Adult)   Pulse 74   Ht 1.778 m (5' 10\")   Wt 94.3 kg (208 lb)   LMP 01/01/2006   SpO2 96%   BMI 29.84 kg/m²     Physical Exam   Constitutional: No distress.   HENT:   Mouth/Throat: Oropharynx is clear and moist. No oropharyngeal exudate.   Eyes: No scleral icterus.   Neck: No JVD present.   Cardiovascular: Normal rate and normal heart sounds.  Exam reveals no gallop and no friction rub.    No murmur heard.  Pulmonary/Chest: No respiratory distress. She has no wheezes. She has no rales.   Abdominal: Soft. Bowel sounds are normal.   Musculoskeletal: She exhibits no edema.   Neurological: She is alert.   Skin: No rash noted. She is not diaphoretic.   Psychiatric: She has a normal mood and affect.       Assessment:     1. Coronary artery disease due to lipid rich plaque - moderate RCA FFR negative 9/2015  DX-CHEST-2 VIEWS   2. Dyslipidemia     3. PAF (paroxysmal atrial fibrillation) ([facility]) - s/p ablation 2015     4. S/P radiofrequency ablation operation for arrhythmia     5. SVT (supraventricular tachycardia) ([facility])     6. Presence of drug coated stent in right coronary artery  REFERRAL TO INTENSIVE CARDIAC REHAB/CARDIAC REHAB       Medical Decision Making:  Today's Assessment / Status / Plan:     It was my pleasure to meet with Ms. Grant Briggs.    Blood pressure is well controlled.      She is on appropriate statin.    We specifically discussed and she understands the importance of dual antiplatelet therapy as well as the risk.  We will switch her to Plavix given the concern for dyspnea with the Brilinta this should improve over the next 2 days    I will see Ms. Grant Briggs back in 1 " month time and encouraged her to follow up with us over the phone or e-mail using my MyChart as issues arise.    It is my pleasure to participate in the care of Ms. Grant Briggs.  Please do not hesitate to contact me with questions or concerns.    Walker Adames MD PhD [facility]  Cardiologist [facility]    Please note that this dictation was created using voice recognition software. I have worked with consultants from the vendor as well as technical experts from [facility] to optimize the interface. I have made every reasonable attempt to correct obvious errors, but I expect that there are errors of grammar and possibly content I did not discover before finalizing the note.

## 2019-03-28 ENCOUNTER — TELEPHONE (OUTPATIENT)
Dept: CARDIOLOGY | Facility: MEDICAL CENTER | Age: 59
End: 2019-03-28

## 2019-03-28 NOTE — TELEPHONE ENCOUNTER
"still having issues after stent placement   Received: Today Message Contents   DENNIS Connor/Marisela     Patient had stent put in back in late February, patient said she's still having issues and would like a call back. Pt. #[phone].      Returned patient call.  She states she was not sure what the timeline should be for recovery after her procedure.  She states, \"I still have chest pain but it is not near as bad.  It is minimal compared to what it was.  I still have SOB but it has dissipated since switching my medications, so it may be from my COPD.\"  Reassurance given to patient that concerns will be relayed to MD for advise.  She states no other concerns or questions at this time and is appreciative of information given.    Concerns relayed to MD for advise.  "

## 2019-03-28 NOTE — TELEPHONE ENCOUNTER
I think she is still expected to have shortness of breath from her lungs also it still is early to understand the full benefit from the stenting but I am pleased that she is feeling quite a bit better with the switch of medicine.    No changes for now hopefully she could do cardiac rehab    Please let her know    It is my pleasure to participate in the care of Ms. Grant Briggs.  Please do not hesitate to contact me with questions or concerns.    Walker Adames MD PhD [facility]  Cardiologist [facility]    Please note that this dictation was created using voice recognition software. I have worked with consultants from the vendor as well as technical experts from [facility] to optimize the interface. I have made every reasonable attempt to correct obvious errors, but I expect that there are errors of grammar and possibly content I did not discover before finalizing the note.

## 2019-03-30 NOTE — TELEPHONE ENCOUNTER
patient is still waiting for a call back   Received: Today Message Contents   DENNIS Connor/Marisela       Patient spoke to you yesterday, she was expecting a call back yesterday afternoon. She is still waiting for a call back. She would appreciate a call back before the end of the day so she doesn't have to wait all weekend. She apologized, she said she understands you are very busy, but she is very worried. She can be reached at [phone].      Returned patient call and reviewed MD recommendations.  She is clarifying the status of ICR Referral. Reviewed status with patient.  She is requesting for [facility] phone number to follow up with referral, [phone].  She verbalizes understanding and states no other concerns or questions at this time.  Pt is appreciative of information given.

## 2019-05-08 ENCOUNTER — APPOINTMENT (OUTPATIENT)
Dept: RADIOLOGY | Facility: MEDICAL CENTER | Age: 59
End: 2019-05-08
Attending: FAMILY MEDICINE
Payer: COMMERCIAL

## 2019-05-29 ENCOUNTER — OFFICE VISIT (OUTPATIENT)
Dept: CARDIOLOGY | Facility: MEDICAL CENTER | Age: 59
End: 2019-05-29
Payer: COMMERCIAL

## 2019-05-29 VITALS
BODY MASS INDEX: 29.78 KG/M2 | SYSTOLIC BLOOD PRESSURE: 118 MMHG | HEIGHT: 70 IN | HEART RATE: 66 BPM | OXYGEN SATURATION: 92 % | DIASTOLIC BLOOD PRESSURE: 70 MMHG | WEIGHT: 208 LBS

## 2019-05-29 DIAGNOSIS — Z98.890 S/P RADIOFREQUENCY ABLATION OPERATION FOR ARRHYTHMIA: ICD-10-CM

## 2019-05-29 DIAGNOSIS — I25.10 CORONARY ARTERY DISEASE DUE TO LIPID RICH PLAQUE: Chronic | ICD-10-CM

## 2019-05-29 DIAGNOSIS — Z86.79 S/P RADIOFREQUENCY ABLATION OPERATION FOR ARRHYTHMIA: ICD-10-CM

## 2019-05-29 DIAGNOSIS — Z95.5 PRESENCE OF DRUG COATED STENT IN RIGHT CORONARY ARTERY: Chronic | ICD-10-CM

## 2019-05-29 DIAGNOSIS — E78.5 DYSLIPIDEMIA: Chronic | ICD-10-CM

## 2019-05-29 DIAGNOSIS — I48.0 PAF (PAROXYSMAL ATRIAL FIBRILLATION) (HCC): Chronic | ICD-10-CM

## 2019-05-29 DIAGNOSIS — I25.83 CORONARY ARTERY DISEASE DUE TO LIPID RICH PLAQUE: Chronic | ICD-10-CM

## 2019-05-29 DIAGNOSIS — I47.10 SVT (SUPRAVENTRICULAR TACHYCARDIA): ICD-10-CM

## 2019-05-29 PROCEDURE — 99214 OFFICE O/P EST MOD 30 MIN: CPT | Performed by: INTERNAL MEDICINE

## 2019-05-29 RX ORDER — NITROGLYCERIN 0.4 MG/1
0.4 TABLET SUBLINGUAL PRN
Qty: 25 TAB | Refills: 11 | Status: SHIPPED | OUTPATIENT
Start: 2019-05-29 | End: 2020-03-02 | Stop reason: SDUPTHER

## 2019-05-29 NOTE — LETTER
"     [facility]   [address]  [place] [address]  Phone: [phone]  Fax: [phone]              Lc Briggs  1960    Encounter Date: 5/29/2019    Walker Adames M.D.          PROGRESS NOTE:  Chief Complaint   Patient presents with   • Coronary Artery Disease       Subjective:   Lc Briggs is a 58 y.o. female who presents today for follow-up of her history of coronary disease status post stenting to RCA earlier this year    She is been feeling better as far as her breathing still not 100% still has occasional chest discomfort    She is noticed that her blood sugars are elevated she was wondering if this was due to plavix      Past Medical History:   Diagnosis Date   • Arthritis current    knees, hands, elbows   • ASTHMA 2008    recurring   • Breath shortness 02/25/2019    uses oxygen at 3 liters/min cont. \"[employer]erred\" oxygen company   • Bronchitis    • Cancer ([facility]) 1980s    Hodgkins lymphoma   • COPD with acute exacerbation ([facility]) 2/25/2015   • Coronary artery disease due to lipid rich plaque - moderate RCA FFR negative    • Dyslipidemia    • High cholesterol 02/25/2019   • Hypertension    • Indigestion current    tx with OTC rolaids   • MI (myocardial infarction) ([facility])    • Oxygen dependent    • PAF (paroxysmal atrial fibrillation) ([facility]) - s/p ablation 2015    • Persistent atrial fibrillation ([facility]) 2/9/2016   • Pneumonia    • Sepsis ([facility]) 2/25/2015     Past Surgical History:   Procedure Laterality Date   • CARDIAC CATH, RIGHT/LEFT HEART     • OTHER CARDIAC SURGERY     • WEDGE RESECTION LUNG       Family History   Problem Relation Age of Onset   • Heart Disease Mother    • Other Mother 72        pacemaker   • Lung Disease Father         severe asthma   • Alcohol/Drug Maternal Grandfather    • Lung Disease Paternal Grandmother      Social History     Social History   • Marital status:      Spouse name: N/A   " "  • Number of children: N/A   • Years of education: N/A     Occupational History   • Not on file.     Social History Main Topics   • Smoking status: Former Smoker     Quit date: 3/21/2013   • Smokeless tobacco: Never Used      Comment: quit 4 days ago, smoked less than 1ppd   • Alcohol use No   • Drug use: No      Comment: former cocaine 'lots of it', former meth clean 9 years   • Sexual activity: Not on file     Other Topics Concern   • Not on file     Social History Narrative   • No narrative on file     Allergies   Allergen Reactions   • Cephalexin Anaphylaxis     Rxn date: 2013.   • Clindamycin Anaphylaxis     Rxn date: 2013.   • Keflex Anaphylaxis   • Codeine Hives, Rash and Itching     Rxn date: \"As a child.\"   • Penicillins Hives, Rash and Itching     Rxn date: \"As a child.\"   • Rosuvastatin Myalgia     Outpatient Encounter Prescriptions as of 5/29/2019   Medication Sig Dispense Refill   • nitroglycerin (NITROSTAT) 0.4 MG SL Tab Place 1 Tab under tongue as needed for Chest Pain. 25 Tab 11   • clopidogrel (PLAVIX) 75 MG Tab Take 1 Tab by mouth every day. 90 Tab 3   • metFORMIN (GLUCOPHAGE) 500 MG Tab Take 1 Tab by mouth 2 times a day. 60 Tab 0   • simvastatin (ZOCOR) 20 MG Tab simvastatin 20 mg tablet     • albuterol (ACCUNEB) 0.63 MG/3ML nebulizer solution albuterol sulfate     • OXYGEN-HELIUM INH oxygen     • atenolol (TENORMIN) 25 MG Tab Take 25 mg by mouth every day.     • aspirin (ASA) 81 MG Chew Tab chewable tablet Take 81 mg by mouth every day.     • albuterol (VENTOLIN OR PROVENTIL) 108 (90 BASE) MCG/ACT AERS inhalation aerosol Inhale 1-2 Puffs by mouth every four hours as needed for Shortness of Breath. Indications: SOB       No facility-administered encounter medications on file as of 5/29/2019.      Review of Systems   Constitutional: Negative for chills and fever.   HENT: Negative for sore throat.    Eyes: Negative for blurred vision.   Respiratory: Positive for shortness of breath. Negative for " "cough.    Cardiovascular: Positive for chest pain. Negative for palpitations, claudication, leg swelling and PND.   Gastrointestinal: Negative for abdominal pain and nausea.   Musculoskeletal: Negative for falls and joint pain.   Skin: Negative for rash.   Neurological: Negative for dizziness, focal weakness and weakness.   Endo/Heme/Allergies: Does not bruise/bleed easily.        Objective:   /70 (BP Location: Left arm, Patient Position: Sitting, BP Cuff Size: Adult)   Pulse 66   Ht 1.778 m (5' 10\")   Wt 94.3 kg (208 lb)   LMP 2006   SpO2 92%   BMI 29.84 kg/m²      Physical Exam   Constitutional: No distress.   HENT:   Mouth/Throat: Oropharynx is clear and moist. No oropharyngeal exudate.   Eyes: No scleral icterus.   Neck: No JVD present.   Cardiovascular: Normal rate and normal heart sounds.  Exam reveals no gallop and no friction rub.    No murmur heard.  Pulmonary/Chest: No respiratory distress. She has no wheezes. She has no rales.   Using supplemental oxygen   Abdominal: Soft. Bowel sounds are normal.   Musculoskeletal: She exhibits no edema.   Neurological: She is alert.   Skin: No rash noted. She is not diaphoretic.   Psychiatric: She has a normal mood and affect.     We reviewed in person the most recent labs  Recent Results (from the past 4032 hour(s))   EKG    Collection Time: 19 11:37 AM   Result Value Ref Range    Report       [facility]    Test Date:  2019  Pt Name:    KATHY PHILLIPS      Department: [facility]  MRN:        [id number]                      Room:  Gender:     Female                       Technician: [facility]  :        1960                   Requested By:PATRICIA CUMMINGS  Order #:    [id number]                    Reading MD: Eugenio Sands MD    Measurements  Intervals                                Axis  Rate:       64                           P:          55  KY:         204                          QRS:        67  QRSD:       82                   "         T:          72  QT:         408  QTc:        421    Interpretive Statements  SINUS RHYTHM  FIRST DEGREE AV BLOCK  ATRIAL PREMATURE COMPLEX  Compared to ECG 06/06/2018 19:21:29  T-wave abnormality no longer present    Electronically Signed On 2- 15:44:55 PST by Eugenio Sands MD     Comp Metabolic Panel    Collection Time: 02/25/19 11:48 AM   Result Value Ref Range    Sodium 136 135 - 145 mmol/L    Potassium 3.9 3.6 - 5.5 mmol/L    Chloride 98 96 - 112 mmol/L    Co2 31 20 - 33 mmol/L    Anion Gap 7.0 0.0 - 11.9    Glucose 220 (H) 65 - 99 mg/dL    Bun 12 8 - 22 mg/dL    Creatinine 0.65 0.50 - 1.40 mg/dL    Calcium 9.9 8.5 - 10.5 mg/dL    AST(SGOT) 15 12 - 45 U/L    ALT(SGPT) 22 2 - 50 U/L    Alkaline Phosphatase 74 30 - 99 U/L    Total Bilirubin 0.4 0.1 - 1.5 mg/dL    Albumin 4.3 3.2 - 4.9 g/dL    Total Protein 8.0 6.0 - 8.2 g/dL    Globulin 3.7 (H) 1.9 - 3.5 g/dL    A-G Ratio 1.2 g/dL   CBC WITHOUT DIFFERENTIAL    Collection Time: 02/25/19 11:48 AM   Result Value Ref Range    WBC 8.3 4.8 - 10.8 K/uL    RBC 4.52 4.20 - 5.40 M/uL    Hemoglobin 13.5 12.0 - 16.0 g/dL    Hematocrit 41.5 37.0 - 47.0 %    MCV 91.8 81.4 - 97.8 fL    MCH 29.9 27.0 - 33.0 pg    MCHC 32.5 (L) 33.6 - 35.0 g/dL    RDW 41.5 35.9 - 50.0 fL    Platelet Count 193 164 - 446 K/uL    MPV 9.9 9.0 - 12.9 fL   PT    Collection Time: 02/25/19 11:48 AM   Result Value Ref Range    PT 13.4 12.0 - 14.6 sec    INR 1.01 0.87 - 1.13   APTT    Collection Time: 02/25/19 11:48 AM   Result Value Ref Range    APTT 30.1 24.7 - 36.0 sec   ESTIMATED GFR    Collection Time: 02/25/19 11:48 AM   Result Value Ref Range    GFR If African American >60 >60 mL/min/1.73 m 2    GFR If Non African American >60 >60 mL/min/1.73 m 2   EKG STAT    Collection Time: 02/27/19 10:12 AM   Result Value Ref Range    Report       [facility]    Test Date:  2019-02-27  Pt Name:    KATHY PHILLIPS      Department: [facility]  MRN:        [id number]                      Room:        PPUPOOL  Gender:     Female                       Technician: CHARLES  :        1960                   Requested By:PATRICIA CUMMINGS  Order #:    [id number]                    Reading MD: Stan French MD    Measurements  Intervals                                Axis  Rate:       58                           P:          59  PA:         222                          QRS:        68  QRSD:       85                           T:          74  QT:         449  QTc:        442    Interpretive Statements  SINUS BRADYCARDIA  PROLONGED PA INTERVAL  LOW VOLTAGE, PRECORDIAL LEADS  Compared to ECG 2019 11:37:37  Low QRS voltage now present  Sinus rhythm no longer present  Atrial premature complex(es) no longer present    Electronically Signed On 2019 13:09:52 PST by Stan French MD         Assessment:     1. Presence of drug coated stent in right coronary artery      2. S/P radiofrequency ablation operation for arrhythmia     3. PAF (paroxysmal atrial fibrillation) ([facility]) - s/p ablation      4. Dyslipidemia     5. Coronary artery disease due to lipid rich plaque - moderate RCA FFR negative 2015     6. SVT (supraventricular tachycardia) ([facility])         Medical Decision Making:  Today's Assessment / Status / Plan:     It was my pleasure to meet with Ms. Grant Briggs.    She is done well after his return from think her chest pains could be more related to her lungs when she had a good result from the stenting and improved after we switch from the Brilinta to Plavix    She is on appropriate statin.    Blood pressure is well controlled.      I will see Ms. Grant Briggs back in 6 months time and encouraged her to follow up with us over the phone or e-mail using my MyChart as issues arise.    It is my pleasure to participate in the care of Ms. Grant Briggs.  Please do not hesitate to contact me with questions or concerns.    Walker Adames MD PhD FACC  Cardiologist [facility]    Please note that this dictation was created using voice recognition software. I have worked with consultants from the vendor as well as technical experts from [employer] to optimize the interface. I have made every reasonable attempt to correct obvious errors, but I expect that there are errors of grammar and possibly content I did not discover before finalizing the note.         Gaetano Guardado M.D.  [address] [place] [address]  VIA Facsimile: [phone]

## 2019-05-29 NOTE — PROGRESS NOTES
"Chief Complaint   Patient presents with   • Coronary Artery Disease       Subjective:   Lc Briggs is a 58 y.o. female who presents today for follow-up of her history of coronary disease status post stenting to RCA earlier this year    She is been feeling better as far as her breathing still not 100% still has occasional chest discomfort    She is noticed that her blood sugars are elevated she was wondering if this was due to plavix      Past Medical History:   Diagnosis Date   • Arthritis current    knees, hands, elbows   • ASTHMA 2008    recurring   • Breath shortness 02/25/2019    uses oxygen at 3 liters/min cont. \"[employer]erred\" oxygen company   • Bronchitis    • Cancer ([facility]) 1980s    Hodgkins lymphoma   • COPD with acute exacerbation ([facility]) 2/25/2015   • Coronary artery disease due to lipid rich plaque - moderate RCA FFR negative    • Dyslipidemia    • High cholesterol 02/25/2019   • Hypertension    • Indigestion current    tx with OTC rolaids   • MI (myocardial infarction) ([facility])    • Oxygen dependent    • PAF (paroxysmal atrial fibrillation) ([facility]) - s/p ablation 2015    • Persistent atrial fibrillation ([facility]) 2/9/2016   • Pneumonia    • Sepsis ([facility]) 2/25/2015     Past Surgical History:   Procedure Laterality Date   • CARDIAC CATH, RIGHT/LEFT HEART     • OTHER CARDIAC SURGERY     • WEDGE RESECTION LUNG       Family History   Problem Relation Age of Onset   • Heart Disease Mother    • Other Mother 72        pacemaker   • Lung Disease Father         severe asthma   • Alcohol/Drug Maternal Grandfather    • Lung Disease Paternal Grandmother      Social History     Social History   • Marital status:      Spouse name: N/A   • Number of children: N/A   • Years of education: N/A     Occupational History   • Not on file.     Social History Main Topics   • Smoking status: Former Smoker     Quit date: 3/21/2013   • Smokeless tobacco: Never Used      Comment: quit 4 days ago, smoked less than 1ppd   • " "Alcohol use No   • Drug use: No      Comment: former cocaine 'lots of it', former meth clean 9 years   • Sexual activity: Not on file     Other Topics Concern   • Not on file     Social History Narrative   • No narrative on file     Allergies   Allergen Reactions   • Cephalexin Anaphylaxis     Rxn date: 2013.   • Clindamycin Anaphylaxis     Rxn date: 2013.   • Keflex Anaphylaxis   • Codeine Hives, Rash and Itching     Rxn date: \"As a child.\"   • Penicillins Hives, Rash and Itching     Rxn date: \"As a child.\"   • Rosuvastatin Myalgia     Outpatient Encounter Prescriptions as of 5/29/2019   Medication Sig Dispense Refill   • nitroglycerin (NITROSTAT) 0.4 MG SL Tab Place 1 Tab under tongue as needed for Chest Pain. 25 Tab 11   • clopidogrel (PLAVIX) 75 MG Tab Take 1 Tab by mouth every day. 90 Tab 3   • metFORMIN (GLUCOPHAGE) 500 MG Tab Take 1 Tab by mouth 2 times a day. 60 Tab 0   • simvastatin (ZOCOR) 20 MG Tab simvastatin 20 mg tablet     • albuterol (ACCUNEB) 0.63 MG/3ML nebulizer solution albuterol sulfate     • OXYGEN-HELIUM INH oxygen     • atenolol (TENORMIN) 25 MG Tab Take 25 mg by mouth every day.     • aspirin (ASA) 81 MG Chew Tab chewable tablet Take 81 mg by mouth every day.     • albuterol (VENTOLIN OR PROVENTIL) 108 (90 BASE) MCG/ACT AERS inhalation aerosol Inhale 1-2 Puffs by mouth every four hours as needed for Shortness of Breath. Indications: SOB       No facility-administered encounter medications on file as of 5/29/2019.      Review of Systems   Constitutional: Negative for chills and fever.   HENT: Negative for sore throat.    Eyes: Negative for blurred vision.   Respiratory: Positive for shortness of breath. Negative for cough.    Cardiovascular: Positive for chest pain. Negative for palpitations, claudication, leg swelling and PND.   Gastrointestinal: Negative for abdominal pain and nausea.   Musculoskeletal: Negative for falls and joint pain.   Skin: Negative for rash.   Neurological: Negative " "for dizziness, focal weakness and weakness.   Endo/Heme/Allergies: Does not bruise/bleed easily.        Objective:   /70 (BP Location: Left arm, Patient Position: Sitting, BP Cuff Size: Adult)   Pulse 66   Ht 1.778 m (5' 10\")   Wt 94.3 kg (208 lb)   LMP 2006   SpO2 92%   BMI 29.84 kg/m²     Physical Exam   Constitutional: No distress.   HENT:   Mouth/Throat: Oropharynx is clear and moist. No oropharyngeal exudate.   Eyes: No scleral icterus.   Neck: No JVD present.   Cardiovascular: Normal rate and normal heart sounds.  Exam reveals no gallop and no friction rub.    No murmur heard.  Pulmonary/Chest: No respiratory distress. She has no wheezes. She has no rales.   Using supplemental oxygen   Abdominal: Soft. Bowel sounds are normal.   Musculoskeletal: She exhibits no edema.   Neurological: She is alert.   Skin: No rash noted. She is not diaphoretic.   Psychiatric: She has a normal mood and affect.     We reviewed in person the most recent labs  Recent Results (from the past 4032 hour(s))   EKG    Collection Time: 19 11:37 AM   Result Value Ref Range    Report       [facility]    Test Date:  2019  Pt Name:    KATHY NGUYỄN JACQUELINE      Department: [facility]  MRN:        [id number]                      Room:  Gender:     Female                       Technician: [facility]  :        1960                   Requested By:PATRICIA CUMMINGS  Order #:    [id number]                    Reading MD: Eugenio Sands MD    Measurements  Intervals                                Axis  Rate:       64                           P:          55  NM:         204                          QRS:        67  QRSD:       82                           T:          72  QT:         408  QTc:        421    Interpretive Statements  SINUS RHYTHM  FIRST DEGREE AV BLOCK  ATRIAL PREMATURE COMPLEX  Compared to ECG 2018 19:21:29  T-wave abnormality no longer present    Electronically Signed On 2019 15:44:55 PST by " Eugenio Sands MD     Comp Metabolic Panel    Collection Time: 19 11:48 AM   Result Value Ref Range    Sodium 136 135 - 145 mmol/L    Potassium 3.9 3.6 - 5.5 mmol/L    Chloride 98 96 - 112 mmol/L    Co2 31 20 - 33 mmol/L    Anion Gap 7.0 0.0 - 11.9    Glucose 220 (H) 65 - 99 mg/dL    Bun 12 8 - 22 mg/dL    Creatinine 0.65 0.50 - 1.40 mg/dL    Calcium 9.9 8.5 - 10.5 mg/dL    AST(SGOT) 15 12 - 45 U/L    ALT(SGPT) 22 2 - 50 U/L    Alkaline Phosphatase 74 30 - 99 U/L    Total Bilirubin 0.4 0.1 - 1.5 mg/dL    Albumin 4.3 3.2 - 4.9 g/dL    Total Protein 8.0 6.0 - 8.2 g/dL    Globulin 3.7 (H) 1.9 - 3.5 g/dL    A-G Ratio 1.2 g/dL   CBC WITHOUT DIFFERENTIAL    Collection Time: 19 11:48 AM   Result Value Ref Range    WBC 8.3 4.8 - 10.8 K/uL    RBC 4.52 4.20 - 5.40 M/uL    Hemoglobin 13.5 12.0 - 16.0 g/dL    Hematocrit 41.5 37.0 - 47.0 %    MCV 91.8 81.4 - 97.8 fL    MCH 29.9 27.0 - 33.0 pg    MCHC 32.5 (L) 33.6 - 35.0 g/dL    RDW 41.5 35.9 - 50.0 fL    Platelet Count 193 164 - 446 K/uL    MPV 9.9 9.0 - 12.9 fL   PT    Collection Time: 19 11:48 AM   Result Value Ref Range    PT 13.4 12.0 - 14.6 sec    INR 1.01 0.87 - 1.13   APTT    Collection Time: 19 11:48 AM   Result Value Ref Range    APTT 30.1 24.7 - 36.0 sec   ESTIMATED GFR    Collection Time: 19 11:48 AM   Result Value Ref Range    GFR If African American >60 >60 mL/min/1.73 m 2    GFR If Non African American >60 >60 mL/min/1.73 m 2   EKG STAT    Collection Time: 19 10:12 AM   Result Value Ref Range    Report       [facility]    Test Date:  2019  Pt Name:    KATHY DELMA PHILLIPS      Department: [facility]  MRN:        [id number]                      Room:       [facility]  Gender:     Female                       Technician: CHARLES  :        1960                   Requested By:PATRICIA CUMMINGS  Order #:    [id number]                    Reading MD: Stan French MD    Measurements  Intervals                                 Axis  Rate:       58                           P:          59  NV:         222                          QRS:        68  QRSD:       85                           T:          74  QT:         449  QTc:        442    Interpretive Statements  SINUS BRADYCARDIA  PROLONGED NV INTERVAL  LOW VOLTAGE, PRECORDIAL LEADS  Compared to ECG 02/25/2019 11:37:37  Low QRS voltage now present  Sinus rhythm no longer present  Atrial premature complex(es) no longer present    Electronically Signed On 2- 13:09:52 PST by Stan French MD         Assessment:     1. Presence of drug coated stent in right coronary artery 2019     2. S/P radiofrequency ablation operation for arrhythmia     3. PAF (paroxysmal atrial fibrillation) ([facility]) - s/p ablation 2015     4. Dyslipidemia     5. Coronary artery disease due to lipid rich plaque - moderate RCA FFR negative 9/2015     6. SVT (supraventricular tachycardia) ([facility])         Medical Decision Making:  Today's Assessment / Status / Plan:     It was my pleasure to meet with Ms. Grant Briggs.    She is done well after his return from think her chest pains could be more related to her lungs when she had a good result from the stenting and improved after we switch from the Brilinta to Plavix    She is on appropriate statin.    Blood pressure is well controlled.      I will see Ms. Grant Briggs back in 6 months time and encouraged her to follow up with us over the phone or e-mail using my MyChart as issues arise.    It is my pleasure to participate in the care of Ms. Grant Briggs.  Please do not hesitate to contact me with questions or concerns.    Walker Adames MD PhD FAC  Cardiologist [facility]    Please note that this dictation was created using voice recognition software. I have worked with consultants from the vendor as well as technical experts from [facility] to optimize the interface. I have made every reasonable attempt to correct  obvious errors, but I expect that there are errors of grammar and possibly content I did not discover before finalizing the note.

## 2019-05-30 ENCOUNTER — APPOINTMENT (OUTPATIENT)
Dept: RADIOLOGY | Facility: MEDICAL CENTER | Age: 59
End: 2019-05-30
Attending: FAMILY MEDICINE
Payer: COMMERCIAL

## 2019-05-31 ENCOUNTER — HOSPITAL ENCOUNTER (OUTPATIENT)
Dept: RADIOLOGY | Facility: MEDICAL CENTER | Age: 59
End: 2019-05-31

## 2019-06-06 ASSESSMENT — ENCOUNTER SYMPTOMS
COUGH: 0
FEVER: 0
BLURRED VISION: 0
SHORTNESS OF BREATH: 1
NAUSEA: 0
PND: 0
FALLS: 0
WEAKNESS: 0
BRUISES/BLEEDS EASILY: 0
CLAUDICATION: 0
SORE THROAT: 0
PALPITATIONS: 0
FOCAL WEAKNESS: 0
ABDOMINAL PAIN: 0
CHILLS: 0
DIZZINESS: 0

## 2019-06-08 ENCOUNTER — HOSPITAL ENCOUNTER (OUTPATIENT)
Dept: RADIOLOGY | Facility: MEDICAL CENTER | Age: 59
End: 2019-06-08
Attending: FAMILY MEDICINE
Payer: COMMERCIAL

## 2019-06-08 DIAGNOSIS — Z12.31 SCREENING MAMMOGRAM, ENCOUNTER FOR: ICD-10-CM

## 2019-06-08 PROCEDURE — 77067 SCR MAMMO BI INCL CAD: CPT

## 2019-07-30 ENCOUNTER — OFFICE VISIT (OUTPATIENT)
Dept: PULMONOLOGY | Facility: HOSPICE | Age: 59
End: 2019-07-30
Payer: COMMERCIAL

## 2019-07-30 ENCOUNTER — HOSPITAL ENCOUNTER (OUTPATIENT)
Dept: RADIOLOGY | Facility: MEDICAL CENTER | Age: 59
End: 2019-07-30

## 2019-07-30 VITALS
OXYGEN SATURATION: 93 % | HEIGHT: 70 IN | SYSTOLIC BLOOD PRESSURE: 122 MMHG | DIASTOLIC BLOOD PRESSURE: 60 MMHG | HEART RATE: 70 BPM | RESPIRATION RATE: 16 BRPM | BODY MASS INDEX: 30.06 KG/M2 | TEMPERATURE: 97 F | WEIGHT: 210 LBS

## 2019-07-30 DIAGNOSIS — Z12.2 ENCOUNTER FOR SCREENING FOR MALIGNANT NEOPLASM OF RESPIRATORY ORGANS: ICD-10-CM

## 2019-07-30 DIAGNOSIS — J96.11 CHRONIC RESPIRATORY FAILURE WITH HYPOXIA (HCC): ICD-10-CM

## 2019-07-30 DIAGNOSIS — J44.9 CHRONIC OBSTRUCTIVE PULMONARY DISEASE, UNSPECIFIED COPD TYPE (HCC): ICD-10-CM

## 2019-07-30 DIAGNOSIS — Z87.891 HISTORY OF TOBACCO USE: ICD-10-CM

## 2019-07-30 PROCEDURE — 99204 OFFICE O/P NEW MOD 45 MIN: CPT | Performed by: INTERNAL MEDICINE

## 2019-07-30 ASSESSMENT — ENCOUNTER SYMPTOMS
PND: 0
CONSTIPATION: 0
DOUBLE VISION: 0
NECK PAIN: 0
SPUTUM PRODUCTION: 0
WEIGHT LOSS: 0
PHOTOPHOBIA: 0
EYE DISCHARGE: 0
BACK PAIN: 1
STRIDOR: 0
SHORTNESS OF BREATH: 1
EYE REDNESS: 0
MYALGIAS: 0
HEARTBURN: 0
ORTHOPNEA: 0
HEADACHES: 0
SINUS PAIN: 0
HEMOPTYSIS: 0
TREMORS: 0
PALPITATIONS: 0
DIARRHEA: 0
NAUSEA: 0
FALLS: 0
VOMITING: 0
COUGH: 0
CLAUDICATION: 0
DIAPHORESIS: 0
EYE PAIN: 0
DIZZINESS: 0
CHILLS: 0
FOCAL WEAKNESS: 0
SORE THROAT: 0
WHEEZING: 0
BLURRED VISION: 0
SPEECH CHANGE: 0
FEVER: 0
DEPRESSION: 0
ABDOMINAL PAIN: 0
WEAKNESS: 0

## 2019-07-30 NOTE — PROGRESS NOTES
Chief Complaint   Patient presents with   • Establish Care     last seen CARMEN Guardado MD previous [facility] pulmonary/sleep records in media pervious pma 11/5/15 DX COPD        HPI: This patient is a 58 y.o. female presenting for evaluation of COPD.  The patient was previously followed by pulmonary at [place] but is establishing care with us due to change in insurance coverage.  Her past medical history includes mild obstructive sleep apnea, no longer on supplemental oxygen, atrial arrhythmia status post ablation in 2015 with ongoing evaluation and cardiology, coronary artery disease status post drug-eluting stent most recently in February 2019 to the RCA, type 2 diabetes, COPD complicated by chronic hypoxic respiratory failure.  The patient is a former tobacco user with greater than 35-pack-year history and quit in 2013.  She does not drink alcohol and has no history of illicit drug use.  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  There is no family history of autoimmune or pulmonary disease that the patient is aware of.  Her COPD regimen includes Stiolto Respimat and Combivent as needed.  Her last COPD exacerbation was greater than 3 years ago.  She is active walking a minimum of 1 mile through her job on a daily basis.  Pulmonary function test in December 2017 at [place] showed an FEV1 of 0.81 L prebronchodilator with bronchodilator response and improvement to 1.19 L which is 35% of predicted, FEV1/FVC ratio 43, TLC of 116% predicted and a DLCO of 40% predicted.  Echocardiogram from November 2017 showed left ventricular ejection fraction of 60%, normal diastolic function and RVSP estimated at 36 mmHg.  Last chest x-ray February 25, 2019 shows no acute cardiopulmonary abnormalities.  The patient has no acute complaints today.  She does need refill of her Stiolto which she typically gets samples from as well as Combivent.  She is only using oxygen at night following a repeat sleep study  "done several years ago during which she tells me she did not sleep and was subsequently taken off CPAP.  She does have dyspnea on exertion when walking through the halls of the [place] at work requiring use of her bronchodilator on average 1-2 times daily.  She denies chest pain, chronic cough, fevers, chills, night sweats, weight loss..    Past Medical History:   Diagnosis Date   • Arthritis current    knees, hands, elbows   • ASTHMA 2008    recurring   • Atrial fibrillation ([facility])    • Breath shortness 02/25/2019    uses oxygen at 3 liters/min cont. \"Preferred\" oxygen company   • Bronchitis    • Cancer ([facility]) 1980s    Hodgkins lymphoma   • COPD with acute exacerbation ([facility]) 2/25/2015   • Coronary artery disease due to lipid rich plaque - moderate RCA FFR negative    • Diabetes ([facility])    • Dyslipidemia    • High cholesterol 02/25/2019   • Hypertension    • Indigestion current    tx with OTC rolaids   • MI (myocardial infarction) ([facility])    • Oxygen dependent    • PAF (paroxysmal atrial fibrillation) ([facility]) - s/p ablation 2015    • Persistent atrial fibrillation ([facility]) 2/9/2016   • Pneumonia    • Pulmonary emphysema ([facility])    • Pulmonary nodule    • Restless leg syndrome    • Sepsis ([facility]) 2/25/2015   • Sleep apnea        Social History     Social History   • Marital status:      Spouse name: N/A   • Number of children: N/A   • Years of education: N/A     Occupational History   • Not on file.     Social History Main Topics   • Smoking status: Former Smoker     Packs/day: 1.00     Years: 35.00     Types: Cigarettes     Quit date: 3/21/2013   • Smokeless tobacco: Never Used      Comment: quit 4 days ago, smoked less than 1ppd   • Alcohol use No   • Drug use: No      Comment: former cocaine 'lots of it', former meth clean 9 years   • Sexual activity: Not on file     Other Topics Concern   • Not on file     Social History Narrative   • No narrative on file       Family History   Problem Relation Age of Onset   • Heart " Disease Mother    • Other Mother 72        pacemaker   • Lung Disease Father         severe asthma   • Alcohol/Drug Maternal Grandfather    • Lung Disease Paternal Grandmother        Current Outpatient Prescriptions on File Prior to Visit   Medication Sig Dispense Refill   • clopidogrel (PLAVIX) 75 MG Tab Take 1 Tab by mouth every day. 90 Tab 3   • metFORMIN (GLUCOPHAGE) 500 MG Tab Take 1 Tab by mouth 2 times a day. (Patient taking differently: Take 1,000 mg by mouth 2 times a day.) 60 Tab 0   • simvastatin (ZOCOR) 20 MG Tab simvastatin 20 mg tablet     • OXYGEN-HELIUM INH oxygen     • atenolol (TENORMIN) 25 MG Tab Take 25 mg by mouth every day.     • aspirin (ASA) 81 MG Chew Tab chewable tablet Take 81 mg by mouth every day.     • nitroglycerin (NITROSTAT) 0.4 MG SL Tab Place 1 Tab under tongue as needed for Chest Pain. 25 Tab 11   • albuterol (ACCUNEB) 0.63 MG/3ML nebulizer solution albuterol sulfate     • albuterol (VENTOLIN OR PROVENTIL) 108 (90 BASE) MCG/ACT AERS inhalation aerosol Inhale 1-2 Puffs by mouth every four hours as needed for Shortness of Breath. Indications: SOB       No current facility-administered medications on file prior to visit.        Allergies: Cephalexin; Clindamycin; Keflex; Codeine; Penicillins; and Rosuvastatin    ROS:   Review of Systems   Constitutional: Negative for chills, diaphoresis, fever, malaise/fatigue and weight loss.   HENT: Negative for congestion, ear discharge, ear pain, hearing loss, nosebleeds, sinus pain, sore throat and tinnitus.    Eyes: Negative for blurred vision, double vision, photophobia, pain, discharge and redness.   Respiratory: Positive for shortness of breath. Negative for cough, hemoptysis, sputum production, wheezing and stridor.    Cardiovascular: Negative for chest pain, palpitations, orthopnea, claudication, leg swelling and PND.   Gastrointestinal: Negative for abdominal pain, constipation, diarrhea, heartburn, nausea and vomiting.   Genitourinary:  "Negative for dysuria and urgency.   Musculoskeletal: Positive for back pain. Negative for falls, joint pain, myalgias and neck pain.   Skin: Negative for itching and rash.   Neurological: Negative for dizziness, tremors, speech change, focal weakness, weakness and headaches.   Endo/Heme/Allergies: Negative for environmental allergies.   Psychiatric/Behavioral: Negative for depression.       /60 (BP Location: Left arm, Patient Position: Sitting, BP Cuff Size: Large adult)   Pulse 70   Temp 36.1 °C (97 °F) (Temporal)   Resp 16   Ht 1.778 m (5' 10\")   Wt 95.3 kg (210 lb)   SpO2 93%     Physical Exam:  Physical Exam   Constitutional: She is oriented to person, place, and time. She appears well-developed and well-nourished.   Moderately obese   HENT:   Head: Normocephalic and atraumatic.   Left Ear: External ear normal.   Mouth/Throat: Oropharynx is clear and moist. No oropharyngeal exudate.   Eyes: Pupils are equal, round, and reactive to light. Conjunctivae and EOM are normal. No scleral icterus.   Neck: Neck supple. No JVD present. No tracheal deviation present.   Cardiovascular: Normal rate, regular rhythm and normal heart sounds.  Exam reveals no gallop and no friction rub.    No murmur heard.  Pulmonary/Chest: Effort normal. No accessory muscle usage. No respiratory distress. She has no wheezes. She has no rales.   Decreased bs throughout   Abdominal: Soft. She exhibits no distension. There is no tenderness.   Musculoskeletal: Normal range of motion. She exhibits no edema, tenderness or deformity.   Lymphadenopathy:     She has no cervical adenopathy.   Neurological: She is alert and oriented to person, place, and time. No cranial nerve deficit. Gait normal.   Skin: Skin is warm and dry. No rash noted. No cyanosis. Nails show no clubbing.   Psychiatric: She has a normal mood and affect.       PFTs as reviewed by me personally: as per HPI  Imaging as reviewed by me personally: as per " HPI    Assessment:  1. Chronic respiratory failure with hypoxia (HCC)  DME [facility] [facility]   2. Chronic obstructive pulmonary disease, unspecified COPD type (HCC)  ipratropium-albuterol (COMBIVENT RESPIMAT)  MCG/ACT Aero Soln   3. History of tobacco use     4. Encounter for screening for malignant neoplasm of respiratory organs  CT-CHEST W/O LOW DOSE       Plan:  1.  This is secondary to severe COPD based on pulmonary function test from 2017.  She is currently compliant with supplemental oxygen and I recommend she continue this.  We will evaluate the adequacy of oxygen at night with overnight oximetry on supplemental oxygen given her history of sleep apnea.  2.  This is chronic and severe.  Symptoms are currently well controlled on Stiolto and as needed Combivent.  While dual coverage with antimuscarinic agents is not ideal it has worked well for the patient and I see little risk for harm as patient has not experienced side effects or abused short acting bronchodilators.  We will continue Stiolto and Combivent Respimat as well as supplemental oxygen.  I did consider pulmonary rehab however patient is currently very active on a daily basis.  3.  Patient is tobacco free for the last 8 years however still qualifies for low-dose CT chest screening for lung cancer.  I counseled her on this and she agreed and I have ordered a low-dose CT chest.  4.  See discussion above as diagnosis was placed in order to order low-dose CT chest in former smoker who has quit within the last 15 years.  Return in about 3 months (around 10/30/2019) for copd.

## 2019-08-13 ENCOUNTER — TELEPHONE (OUTPATIENT)
Dept: CARDIOLOGY | Facility: MEDICAL CENTER | Age: 59
End: 2019-08-13

## 2019-08-13 DIAGNOSIS — I47.10 SVT (SUPRAVENTRICULAR TACHYCARDIA): ICD-10-CM

## 2019-08-13 DIAGNOSIS — Z95.5 PRESENCE OF DRUG COATED STENT IN RIGHT CORONARY ARTERY: ICD-10-CM

## 2019-08-13 DIAGNOSIS — R07.89 OTHER CHEST PAIN: ICD-10-CM

## 2019-08-13 DIAGNOSIS — R07.2 PRECORDIAL PAIN: ICD-10-CM

## 2019-08-13 NOTE — TELEPHONE ENCOUNTER
CW    Good morning Marisela    Pt says she has been dianelys/tachy and she is really worried. She wanted sooner appt w/CW. I offered appt for tomorrow at 09:30am and she adv she could not make it. She wanted to see Dr. Lou and I adv there was no availability. I asked if I could see if we had any other cancellations for EP and she declined. She is really worried.    Thank you so much,    Michael

## 2019-08-13 NOTE — TELEPHONE ENCOUNTER
"Returned patient call and reviewed pt concerns.  Pt states she has been having \"tachy/dianelys issues everyday from 50's-130's at rest for about 3 weeks.  It doesn't feel like my heart is pounding out of chest, just a little pressure.  I'll put my pulse oximeter on and it would show my HR is in the 130's.  My oxygen is a lot lower than normal around 84% and I have to use more oxygen 2.5-3L when I usually use 2 L.  I also feel light headed and off balance.  My BP is around 117's/70's which is lower than the usual.  I've taken out caffeine.\"  Pt states she does not know if the lower O2 sat happens when her HR is high.  Pt states she's been taking all her medications as prescribed.  Pt is requesting to be seen sooner than her scheduled appt with MD in November.  Offered pt appt with MD tomorrow as recommended by .  She states, \"That's not a possibility.  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  Offered pt to be seen by [facility] today and she states, \"I do not feel comfortable seeing them.\"  ED precautions noted for worsening symptoms.  She is requesting to be on a first cancellation list and she is available Mondays/Tuesday and after 3:30 on the other days.      Pt update and concerns relayed to MD for advise.  "

## 2019-08-13 NOTE — TELEPHONE ENCOUNTER
Returned pt call and reviewed MD recommendations.  She verbalizes understanding and states if she decides to proceed, she will call this office with her decision.  She states no other concerns or questions at this time and is appreciative of information given.

## 2019-08-13 NOTE — TELEPHONE ENCOUNTER
Seems likely that it is more related to her oxygen, she could wear the 48 hour holter, but last year the holter was okay      Thank you

## 2019-08-19 ENCOUNTER — APPOINTMENT (OUTPATIENT)
Dept: RADIOLOGY | Facility: MEDICAL CENTER | Age: 59
End: 2019-08-19
Attending: FAMILY MEDICINE
Payer: COMMERCIAL

## 2019-08-20 ENCOUNTER — HOSPITAL ENCOUNTER (OUTPATIENT)
Dept: RADIOLOGY | Facility: MEDICAL CENTER | Age: 59
End: 2019-08-20
Attending: FAMILY MEDICINE
Payer: COMMERCIAL

## 2019-08-20 DIAGNOSIS — R10.10 UPPER ABDOMINAL PAIN: ICD-10-CM

## 2019-08-20 PROCEDURE — 74018 RADEX ABDOMEN 1 VIEW: CPT

## 2019-09-03 ENCOUNTER — TELEPHONE (OUTPATIENT)
Dept: PULMONOLOGY | Facility: HOSPICE | Age: 59
End: 2019-09-03

## 2019-09-03 DIAGNOSIS — J44.9 CHRONIC OBSTRUCTIVE PULMONARY DISEASE, UNSPECIFIED COPD TYPE (HCC): ICD-10-CM

## 2019-09-03 RX ORDER — ALBUTEROL SULFATE 90 UG/1
2 AEROSOL, METERED RESPIRATORY (INHALATION) EVERY 4 HOURS PRN
Qty: 1 INHALER | Refills: 1 | Status: SHIPPED | OUTPATIENT
Start: 2019-09-03 | End: 2022-01-04 | Stop reason: SDUPTHER

## 2019-09-03 NOTE — TELEPHONE ENCOUNTER
Tiffany Sands M.D.  You 33 minutes ago (12:19 PM)     Do you mind letting her know that her oxygen levels were normal.  Thank you!

## 2019-09-03 NOTE — TELEPHONE ENCOUNTER
1. Caller Name: Lc                      Call Back Number: [phone] (home)       2. Message: Pt called wanting to know her results of her OPO. (results are in Media). Also pt said the Combivent Resp. Inhaler that was prescribed to her, her insurance does not cover it.  Recommendation on something else or pt wants to know if she should receive it at the sample pharmacy?    Please advise    3. Patient approves office to leave a detailed voicemail/MyChart message: yes per pt.

## 2019-09-03 NOTE — TELEPHONE ENCOUNTER
.  Tiffany Sands M.D.  You 33 minutes ago (12:19 PM)     Do you mind letting her know that her oxygen levels were normal.  Thank you!      Tiffany Sands M.D.  You 11 minutes ago (1:27 PM)     Any albuterol inhaler would be sufficient   Ventolin   Albuterol   Proair   All would be 1-2 puffs [id number] as needed for SOB/wheezing or cough         .

## 2019-09-10 ENCOUNTER — TELEPHONE (OUTPATIENT)
Dept: CARDIOLOGY | Facility: MEDICAL CENTER | Age: 59
End: 2019-09-10

## 2019-09-10 NOTE — TELEPHONE ENCOUNTER
JACKIE/thierry  Pt calling to discuss getting CW's approval to stop taking plavix due to some issues it is causing. Please call Lc

## 2019-09-11 NOTE — TELEPHONE ENCOUNTER
Returned patient call and reviewed concerns.  She states she has a cyst that needs to be removed by the end of the year and is requesting clearance.  Instructed pt to contact provider to fax clearance request to this office.  Fax number [phone] given.  She verbalizes understanding and is appreciative of information given.    Will await for clearance request to be received.

## 2019-10-04 DIAGNOSIS — J44.9 CHRONIC OBSTRUCTIVE PULMONARY DISEASE, UNSPECIFIED COPD TYPE (HCC): ICD-10-CM

## 2019-10-07 ENCOUNTER — APPOINTMENT (OUTPATIENT)
Dept: RADIOLOGY | Facility: MEDICAL CENTER | Age: 59
End: 2019-10-07
Attending: FAMILY MEDICINE
Payer: COMMERCIAL

## 2019-10-14 ENCOUNTER — HOSPITAL ENCOUNTER (OUTPATIENT)
Dept: RADIOLOGY | Facility: MEDICAL CENTER | Age: 59
End: 2019-10-14
Attending: FAMILY MEDICINE
Payer: COMMERCIAL

## 2019-10-14 DIAGNOSIS — R10.10 UPPER ABDOMINAL PAIN: ICD-10-CM

## 2019-10-14 PROCEDURE — 74150 CT ABDOMEN W/O CONTRAST: CPT

## 2019-10-20 ENCOUNTER — OFFICE VISIT (OUTPATIENT)
Dept: URGENT CARE | Facility: CLINIC | Age: 59
End: 2019-10-20
Payer: COMMERCIAL

## 2019-10-20 ENCOUNTER — APPOINTMENT (OUTPATIENT)
Dept: RADIOLOGY | Facility: IMAGING CENTER | Age: 59
End: 2019-10-20
Attending: PHYSICIAN ASSISTANT
Payer: COMMERCIAL

## 2019-10-20 VITALS
SYSTOLIC BLOOD PRESSURE: 114 MMHG | RESPIRATION RATE: 16 BRPM | HEIGHT: 70 IN | BODY MASS INDEX: 29.63 KG/M2 | HEART RATE: 87 BPM | DIASTOLIC BLOOD PRESSURE: 64 MMHG | TEMPERATURE: 98.6 F | OXYGEN SATURATION: 95 % | WEIGHT: 207 LBS

## 2019-10-20 DIAGNOSIS — J44.1 COPD EXACERBATION (HCC): ICD-10-CM

## 2019-10-20 DIAGNOSIS — R05.9 COUGH: ICD-10-CM

## 2019-10-20 PROCEDURE — 99204 OFFICE O/P NEW MOD 45 MIN: CPT | Performed by: PHYSICIAN ASSISTANT

## 2019-10-20 PROCEDURE — 71046 X-RAY EXAM CHEST 2 VIEWS: CPT | Mod: TC | Performed by: PHYSICIAN ASSISTANT

## 2019-10-20 RX ORDER — PREDNISONE 20 MG/1
40 TABLET ORAL DAILY
Qty: 10 TAB | Refills: 0 | Status: SHIPPED | OUTPATIENT
Start: 2019-10-20 | End: 2019-10-25

## 2019-10-20 RX ORDER — DOXYCYCLINE HYCLATE 100 MG
100 TABLET ORAL 2 TIMES DAILY
Qty: 14 TAB | Refills: 0 | Status: SHIPPED | OUTPATIENT
Start: 2019-10-20 | End: 2019-10-27

## 2019-10-20 ASSESSMENT — ENCOUNTER SYMPTOMS
NAUSEA: 0
SHORTNESS OF BREATH: 1
SORE THROAT: 0
EYE REDNESS: 0
EYE DISCHARGE: 0
VOMITING: 0
ABDOMINAL PAIN: 0
COUGH: 1
WHEEZING: 1
HEADACHES: 1
MYALGIAS: 1
FEVER: 0

## 2019-10-20 ASSESSMENT — COPD QUESTIONNAIRES: COPD: 1

## 2019-10-20 NOTE — PROGRESS NOTES
Subjective:      Lc Briggs is a 59 y.o. female who presents with Cough (x4 days hx of COPD, SOB, chest congestion, feels like there is no auir moving through lungs)        Cough   This is a new problem. Episode onset: x 4 days ago. The problem has been unchanged. The problem occurs constantly. The cough is non-productive. Associated symptoms include headaches, myalgias, shortness of breath and wheezing. Pertinent negatives include no chest pain, ear pain, eye redness, fever, nasal congestion, rash or sore throat. The symptoms are aggravated by lying down. She has tried a beta-agonist inhaler for the symptoms. The treatment provided mild relief. Her past medical history is significant for COPD.     The patient started an old prescription of Levaquin 500mg on Friday.    The patient is currently using O2.  The patient states she uses 2.5-3L of O2 at baseline.  The patient states she has not had to increase her O2 given her current symptoms.    PMH:  has a past medical history of Arthritis (current), ASTHMA (2008), Atrial fibrillation ([facility]), Breath shortness (02/25/2019), Bronchitis, Cancer ([facility]) (1980s), COPD with acute exacerbation ([facility]) (2/25/2015), Coronary artery disease due to lipid rich plaque - moderate RCA FFR negative, Diabetes ([facility]), Dyslipidemia, High cholesterol (02/25/2019), Hypertension, Indigestion (current), MI (myocardial infarction) ([facility]), Oxygen dependent, PAF (paroxysmal atrial fibrillation) ([facility]) - s/p ablation 2015, Persistent atrial fibrillation (2/9/2016), Pneumonia, Pulmonary emphysema ([facility]), Pulmonary nodule, Restless leg syndrome, Sepsis ([facility]) (2/25/2015), and Sleep apnea. She also has no past medical history of Angina, Backpain, Dialysis, Heart valve disease, Jaundice, Other specified symptom associated with female genital organs, Pacemaker, Personal history of venous thrombosis and embolism, Psychiatric problem, Rheumatic fever, Unspecified disorder of thyroid, Unspecified  "hemorrhagic conditions, or Unspecified urinary incontinence.  MEDS:   Current Outpatient Medications:   •  Tiotropium Bromide-Olodaterol 2.5-2.5 MCG/ACT Aero Soln, INHALE 2 PUFFS ORALLY ONCE DAILY, Disp: 2 Inhaler, Rfl: 0  •  albuterol (PROAIR HFA) 108 (90 Base) MCG/ACT Aero Soln inhalation aerosol, Inhale 2 Puffs by mouth every four hours as needed for Shortness of Breath (wheezing)., Disp: 1 Inhaler, Rfl: 1  •  nitroglycerin (NITROSTAT) 0.4 MG SL Tab, Place 1 Tab under tongue as needed for Chest Pain., Disp: 25 Tab, Rfl: 11  •  clopidogrel (PLAVIX) 75 MG Tab, Take 1 Tab by mouth every day., Disp: 90 Tab, Rfl: 3  •  metFORMIN (GLUCOPHAGE) 500 MG Tab, Take 1 Tab by mouth 2 times a day. (Patient taking differently: Take 1,000 mg by mouth 2 times a day.), Disp: 60 Tab, Rfl: 0  •  simvastatin (ZOCOR) 20 MG Tab, simvastatin 20 mg tablet, Disp: , Rfl:   •  albuterol (ACCUNEB) 0.63 MG/3ML nebulizer solution, albuterol sulfate, Disp: , Rfl:   •  OXYGEN-HELIUM INH, oxygen, Disp: , Rfl:   •  atenolol (TENORMIN) 25 MG Tab, Take 25 mg by mouth every day., Disp: , Rfl:   •  aspirin (ASA) 81 MG Chew Tab chewable tablet, Take 81 mg by mouth every day., Disp: , Rfl:   ALLERGIES:   Allergies   Allergen Reactions   • Cephalexin Anaphylaxis     Rxn date: 2013.   • Clindamycin Anaphylaxis     Rxn date: 2013.   • Keflex Anaphylaxis   • Codeine Hives, Rash and Itching     Rxn date: \"As a child.\"   • Penicillins Hives, Rash and Itching     Rxn date: \"As a child.\"   • Rosuvastatin Myalgia     SURGHX:   Past Surgical History:   Procedure Laterality Date   • CARDIAC CATH, RIGHT/LEFT HEART     • OTHER CARDIAC SURGERY     • WEDGE RESECTION LUNG       SOCHX:  reports that she quit smoking about 6 years ago. Her smoking use included cigarettes. She has a 35.00 pack-year smoking history. She has never used smokeless tobacco. She reports that she does not drink alcohol or use drugs.  FH: Family history was reviewed, no pertinent findings to " "report        Review of Systems   Constitutional: Negative for fever.   HENT: Negative for congestion, ear pain and sore throat.    Eyes: Negative for discharge and redness.   Respiratory: Positive for cough, shortness of breath and wheezing.    Cardiovascular: Negative for chest pain and leg swelling.   Gastrointestinal: Negative for abdominal pain, nausea and vomiting.   Musculoskeletal: Positive for myalgias.   Skin: Negative for rash.   Neurological: Positive for headaches.   All other systems reviewed and are negative.         Objective:     /64   Pulse 87   Temp 37 °C (98.6 °F) (Temporal)   Resp 16   Ht 1.778 m (5' 10\")   Wt 93.9 kg (207 lb)   LMP 01/01/2006   SpO2 95%   BMI 29.70 kg/m²      Physical Exam   Constitutional: She is oriented to person, place, and time. She appears well-developed and well-nourished. No distress.   HENT:   Head: Normocephalic and atraumatic.   Right Ear: Tympanic membrane, external ear and ear canal normal.   Left Ear: Tympanic membrane, external ear and ear canal normal.   Nose: Nose normal.   Mouth/Throat: Oropharynx is clear and moist and mucous membranes are normal. No posterior oropharyngeal erythema.   Eyes: Conjunctivae and EOM are normal.   Neck: Normal range of motion. Neck supple.   Cardiovascular: Normal rate, regular rhythm and normal heart sounds.   Pulmonary/Chest: Effort normal. No respiratory distress. She has decreased breath sounds.   Musculoskeletal: Normal range of motion.   Neurological: She is alert and oriented to person, place, and time.   Skin: Skin is warm and dry.          Progress:  CXR:  FINDINGS:  Cardiac mediastinal contour is unchanged.  Minimal linear opacities at both lung bases.  Lungs show hyperinflation.  No pleural fluid collection or pneumothorax.  No major bony abnormality is seen.      Impression       1.  Hyperinflation suggesting COPD.  2.  Minimal bibasilar atelectasis or scar.  3.  No pneumonia or pneumothorax.         "   Assessment/Plan:     1. Cough  - DX-CHEST-2 VIEWS; Future    2. COPD exacerbation (HCC)  - doxycycline (VIBRAMYCIN) 100 MG Tab; Take 1 Tab by mouth 2 times a day for 7 days.  Dispense: 14 Tab; Refill: 0  - predniSONE (DELTASONE) 20 MG Tab; Take 2 Tabs by mouth every day for 5 days.  Dispense: 10 Tab; Refill: 0    The patient's presenting symptoms and physical exam are consistent with a cough, likely secondary to an acute COPD exacerbation.  The patient's chest x-ray today in clinic showed hyperinflation suggestive of COPD with minimal bibasilar atelectasis or scar.  No pneumonia or pneumothorax was appreciated.  On physical exam, the patient had decreased breath sounds throughout all lung fields without wheezing.  The patient's pulse ox was stable and within normal limits on her normal 3 L of O2.  The patient declined a nebulizer treatment today in clinic for her current symptoms.  Will prescribe the patient Doxycycline and Prednisone for her acute COPD exacerbation.  Advised the patient to continue the Levaquin that she self started prior to being seen.  Instructed the patient to start the Doxycycline after completing the prescription of Levaquin if her symptoms are not improved.  Recommend the patient follow-up with her PCP/pulmonologist.  Recommend OTC medications and supportive care for symptomatic management.  Discussed return precautions with the patient, and she verbalized understanding.    Differential diagnoses, supportive care, and indications for immediate follow-up discussed with patient.   Instructed to return to clinic or nearest emergency department for any change in condition, further concerns, or worsening of symptoms.    Continue Levaquin as prescribed  Continue inhalers and nebulizer as prescribed  OTC Tylenol or Motrin for fever/discomfort  OTC cough/cold medication for symptomatic relief  OTC Supportive Care for Congestion - saline nasal spray or neti pot  Drink plenty of fluids  Follow-up  with PCP  Return to clinic or go to the ED if symptoms worsen or fail to improve, or if the patient should develop worsening/increasing cough, congestion, ear pain, sore throat, shortness of breath, wheezing, chest pain, fever/chills, and/or any concerning symptoms.    Discussed plan with the patient, and she agrees to the above.

## 2019-10-29 ENCOUNTER — OFFICE VISIT (OUTPATIENT)
Dept: PULMONOLOGY | Facility: HOSPICE | Age: 59
End: 2019-10-29
Payer: COMMERCIAL

## 2019-10-29 VITALS
WEIGHT: 205 LBS | DIASTOLIC BLOOD PRESSURE: 56 MMHG | SYSTOLIC BLOOD PRESSURE: 124 MMHG | HEART RATE: 69 BPM | RESPIRATION RATE: 16 BRPM | OXYGEN SATURATION: 95 % | BODY MASS INDEX: 29.35 KG/M2 | HEIGHT: 70 IN | TEMPERATURE: 97.5 F

## 2019-10-29 DIAGNOSIS — J96.11 CHRONIC RESPIRATORY FAILURE WITH HYPOXIA (HCC): ICD-10-CM

## 2019-10-29 DIAGNOSIS — J44.9 CHRONIC OBSTRUCTIVE PULMONARY DISEASE, UNSPECIFIED COPD TYPE (HCC): ICD-10-CM

## 2019-10-29 DIAGNOSIS — Z87.891 HISTORY OF TOBACCO USE: ICD-10-CM

## 2019-10-29 PROCEDURE — 99214 OFFICE O/P EST MOD 30 MIN: CPT | Performed by: INTERNAL MEDICINE

## 2019-10-29 RX ORDER — IPRATROPIUM BROMIDE AND ALBUTEROL SULFATE 2.5; .5 MG/3ML; MG/3ML
3 SOLUTION RESPIRATORY (INHALATION) EVERY 4 HOURS PRN
Qty: 120 ML | Refills: 11 | Status: SHIPPED | OUTPATIENT
Start: 2019-10-29 | End: 2020-12-30

## 2019-10-29 RX ORDER — FLUTICASONE PROPIONATE 110 UG/1
2 AEROSOL, METERED RESPIRATORY (INHALATION) 2 TIMES DAILY
Qty: 1 INHALER | Refills: 11 | Status: SHIPPED | OUTPATIENT
Start: 2019-10-29 | End: 2021-05-28

## 2019-10-29 RX ORDER — GUAIFENESIN 600 MG/1
600 TABLET, EXTENDED RELEASE ORAL EVERY 12 HOURS
Qty: 28 TAB | Refills: 3 | Status: SHIPPED | OUTPATIENT
Start: 2019-10-29 | End: 2021-05-28

## 2019-10-29 ASSESSMENT — ENCOUNTER SYMPTOMS
STRIDOR: 0
SPUTUM PRODUCTION: 0
FEVER: 0
TREMORS: 0
SORE THROAT: 0
PHOTOPHOBIA: 0
BLURRED VISION: 0
SPEECH CHANGE: 0
HEMOPTYSIS: 0
SINUS PAIN: 0
WEAKNESS: 0
PND: 0
NAUSEA: 0
ORTHOPNEA: 1
EYE PAIN: 0
DIAPHORESIS: 0
ABDOMINAL PAIN: 0
HEADACHES: 0
CLAUDICATION: 0
CHILLS: 0
DIZZINESS: 0
DEPRESSION: 0
SHORTNESS OF BREATH: 1
CONSTIPATION: 0
WHEEZING: 0
FALLS: 0
BACK PAIN: 0
WEIGHT LOSS: 0
EYE DISCHARGE: 0
COUGH: 0
HEARTBURN: 0
DIARRHEA: 0
EYE REDNESS: 0
DOUBLE VISION: 0
VOMITING: 0
NECK PAIN: 0
FOCAL WEAKNESS: 0
PALPITATIONS: 0
MYALGIAS: 0

## 2019-10-29 NOTE — PATIENT INSTRUCTIONS
Take guaifenesin twice daily; do breathing tx with nebulizer roughly 30 minutes afterward to assist with mucous clearance

## 2019-10-29 NOTE — PROGRESS NOTES
Chief Complaint   Patient presents with   • Follow-Up     chronic respiratory failure last seen 7/30/19    • Results     CXR 10/20/19    • Shortness of Breath     since last Thursday, difficult breathing.          HPI: This patient is a 59 y.o. female whom is followed in our clinic for COPD c/b chronic hypoxic respiratory failure last seen by me to establish care on 7/30/19.   Her past medical history includes mild obstructive sleep apnea, no longer on CPAP and OPO on 2LPM ordered at our last visit confirmed adequate oxygenation on this at night, atrial arrhythmia status post ablation in 2015 with ongoing evaluation and cardiology, coronary artery disease status post drug-eluting stent most recently in February 2019 to the RCA, type 2 diabetes, COPD complicated by chronic hypoxic respiratory failure.  The patient is a former tobacco user with greater than 35-pack-year history and quit in 2013.    Her COPD regimen includes Stiolto Respimat and Ventolin as needed.  Her last COPD exacerbation was greater than 3 years ago.  She is active walking a minimum of 1 mile at her job on a daily basis.  Pulmonary function test in December 2017 at [place] showed an FEV1 of 0.81 L prebronchodilator with bronchodilator response and improvement to 1.19 L which is 35% of predicted, FEV1/FVC ratio 43, TLC of 116% predicted and a DLCO of 40% predicted.  Echocardiogram from November 2017 showed left ventricular ejection fraction of 60%, normal diastolic function and RVSP estimated at 36 mmHg.  Since her last visit the patient has had increased difficulty with shortness of breath particularly with activity and when lying flat.  She has some mild dull chest pain and has noted erratic heartbeat from the 50s to 130s.  She did contact cardiology regarding this and they felt it was likely related to her oxygenation although considering Holter monitor.  Apparently a pacemaker has been suggested in the past.  She currently denies fevers,  "chills, sputum production but does feel as though she is not moving air as well.  She was seen in the emergency department on  with chest x-ray showing no effusions, infiltrates.  She was given 5 days of prednisone in addition to antibiotics with no significant improvement in her symptoms.  She denies lower extremity edema.    Past Medical History:   Diagnosis Date   • Arthritis current    knees, hands, elbows   • ASTHMA     recurring   • Atrial fibrillation ([facility])    • Breath shortness 2019    uses oxygen at 3 liters/min cont. \"[employer]erred\" oxygen company   • Bronchitis    • Cancer ([facility]) 1980s    Hodgkins lymphoma   • COPD with acute exacerbation ([facility]) 2015   • Coronary artery disease due to lipid rich plaque - moderate RCA FFR negative    • Diabetes ([facility])    • Dyslipidemia    • High cholesterol 2019   • Hypertension    • Indigestion current    tx with OTC rolaids   • MI (myocardial infarction) ([facility])    • Oxygen dependent    • PAF (paroxysmal atrial fibrillation) ([facility]) - s/p ablation     • Persistent atrial fibrillation 2016   • Pneumonia    • Pulmonary emphysema ([facility])    • Pulmonary nodule    • Restless leg syndrome    • Sepsis ([facility]) 2015   • Sleep apnea        Social History     Socioeconomic History   • Marital status:      Spouse name: Not on file   • Number of children: Not on file   • Years of education: Not on file   • Highest education level: Not on file   Occupational History   • Not on file   Social Needs   • Financial resource strain: Not on file   • Food insecurity:     Worry: Not on file     Inability: Not on file   • Transportation needs:     Medical: Not on file     Non-medical: Not on file   Tobacco Use   • Smoking status: Former Smoker     Packs/day: 1.00     Years: 35.00     Pack years: 35.00     Types: Cigarettes     Last attempt to quit: 3/21/2013     Years since quittin.6   • Smokeless tobacco: Never Used   • Tobacco comment: quit 4 days ago, " smoked less than 1ppd   Substance and Sexual Activity   • Alcohol use: No   • Drug use: No     Comment: former cocaine 'lots of it', former meth clean 9 years   • Sexual activity: Not on file   Lifestyle   • Physical activity:     Days per week: Not on file     Minutes per session: Not on file   • Stress: Not on file   Relationships   • Social connections:     Talks on phone: Not on file     Gets together: Not on file     Attends Anabaptist service: Not on file     Active member of club or organization: Not on file     Attends meetings of clubs or organizations: Not on file     Relationship status: Not on file   • Intimate partner violence:     Fear of current or ex partner: Not on file     Emotionally abused: Not on file     Physically abused: Not on file     Forced sexual activity: Not on file   Other Topics Concern   • Not on file   Social History Narrative   • Not on file       Family History   Problem Relation Age of Onset   • Heart Disease Mother    • Other Mother 72        pacemaker   • Lung Disease Father         severe asthma   • Alcohol/Drug Maternal Grandfather    • Lung Disease Paternal Grandmother        Current Outpatient Medications on File Prior to Visit   Medication Sig Dispense Refill   • albuterol (PROAIR HFA) 108 (90 Base) MCG/ACT Aero Soln inhalation aerosol Inhale 2 Puffs by mouth every four hours as needed for Shortness of Breath (wheezing). 1 Inhaler 1   • nitroglycerin (NITROSTAT) 0.4 MG SL Tab Place 1 Tab under tongue as needed for Chest Pain. 25 Tab 11   • clopidogrel (PLAVIX) 75 MG Tab Take 1 Tab by mouth every day. 90 Tab 3   • metFORMIN (GLUCOPHAGE) 500 MG Tab Take 1 Tab by mouth 2 times a day. (Patient taking differently: Take 1,000 mg by mouth 2 times a day.) 60 Tab 0   • simvastatin (ZOCOR) 20 MG Tab simvastatin 20 mg tablet     • albuterol (ACCUNEB) 0.63 MG/3ML nebulizer solution albuterol sulfate     • OXYGEN-HELIUM INH oxygen     • atenolol (TENORMIN) 25 MG Tab Take 25 mg by mouth  "every day.     • aspirin (ASA) 81 MG Chew Tab chewable tablet Take 81 mg by mouth every day.       No current facility-administered medications on file prior to visit.        Cephalexin; Clindamycin; Keflex; Codeine; Penicillins; and Rosuvastatin      ROS:   Review of Systems   Constitutional: Negative for chills, diaphoresis, fever, malaise/fatigue and weight loss.   HENT: Negative for congestion, ear discharge, ear pain, hearing loss, nosebleeds, sinus pain, sore throat and tinnitus.    Eyes: Negative for blurred vision, double vision, photophobia, pain, discharge and redness.   Respiratory: Positive for shortness of breath. Negative for cough, hemoptysis, sputum production, wheezing and stridor.    Cardiovascular: Positive for orthopnea. Negative for chest pain, palpitations, claudication, leg swelling and PND.   Gastrointestinal: Negative for abdominal pain, constipation, diarrhea, heartburn, nausea and vomiting.   Genitourinary: Negative for dysuria and urgency.   Musculoskeletal: Negative for back pain, falls, joint pain, myalgias and neck pain.   Skin: Negative for itching and rash.   Neurological: Negative for dizziness, tremors, speech change, focal weakness, weakness and headaches.   Endo/Heme/Allergies: Negative for environmental allergies.   Psychiatric/Behavioral: Negative for depression.       /56 (BP Location: Left arm, Patient Position: Sitting, BP Cuff Size: Adult)   Pulse 69   Temp 36.4 °C (97.5 °F) (Temporal)   Resp 16   Ht 1.778 m (5' 10\")   Wt 93 kg (205 lb)   SpO2 95%   Physical Exam   Constitutional: She is oriented to person, place, and time. She appears well-developed and well-nourished. No distress.   HENT:   Head: Normocephalic and atraumatic.   Right Ear: External ear normal.   Left Ear: External ear normal.   Mouth/Throat: Oropharynx is clear and moist. No oropharyngeal exudate.   Eyes: Pupils are equal, round, and reactive to light. Conjunctivae and EOM are normal. No " scleral icterus.   Neck: Neck supple. No JVD present. No tracheal deviation present.   Cardiovascular: Normal rate, regular rhythm and normal heart sounds. Exam reveals no gallop and no friction rub.   No murmur heard.  Pulmonary/Chest: Effort normal. No accessory muscle usage. No respiratory distress. She has no wheezes. She has no rales.   Decreased bs throughout   Abdominal: Soft. She exhibits no distension. There is no tenderness.   Musculoskeletal: Normal range of motion. She exhibits no edema, tenderness or deformity.   Lymphadenopathy:     She has no cervical adenopathy.   Neurological: She is alert and oriented to person, place, and time. No cranial nerve deficit. Gait normal.   Skin: Skin is warm and dry. No rash noted. No cyanosis. Nails show no clubbing.   Psychiatric: She has a normal mood and affect.       PFTs as reviewed by me personally: as per HPI    Imaging as reviewed by me personally:  As per hPI    Assessment:  1. Chronic obstructive pulmonary disease, unspecified COPD type (HCC)  Tiotropium Bromide-Olodaterol 2.5-2.5 MCG/ACT Aero Soln    DME Nebulizer    ipratropium-albuterol (DUONEB) 0.5-2.5 (3) MG/3ML nebulizer solution    fluticasone (FLOVENT HFA) 110 MCG/ACT Aerosol    PULMONARY FUNCTION TESTS -Test requested: Complete Pulmonary Function Test    guaiFENesin ER (MUCINEX) 600 MG TABLET SR 12 HR   2. Chronic respiratory failure with hypoxia (HCC)     3. History of tobacco use         Plan:  1.  It does appear that her COPD is worsening from a symptomatic standpoint.  She was treated empirically for acute exacerbation with no significant improvement.  She does feel she is having difficulty expectorating and I recommend that we start an airway clearance regimen with mucolytic, cough medicine twice daily followed by nebulized bronchodilators.  I have changed her prescription to DuoNeb's over low-dose albuterol which she was previously prescribed.  I am also recommending the addition of inhaled  corticosteroid which we can accomplish with adding Flovent to her current regimen of Stiolto.  I would like to update pulmonary function test and we did discuss pulmonary rehab which the patient would like to hold off on for now.  We will see her back in the next 6 weeks with the updated pulmonary function test.  2.  This is secondary to severe COPD.  As per above we did discuss referral to pulmonary rehab.  We will update PFTs and readdress this at follow-up.  In the meantime we will continue supplemental oxygen with activity and at night.  Overnight oximetry confirmed adequate oxygenation on 2 L.  3.  Patient is tobacco free for the past 6 years however she does qualify for low-dose CT screening.  I did order this however she needs referral to lung cancer screening program which I will do.  Return in about 6 weeks (around 12/10/2019) for PFTS.

## 2019-10-29 NOTE — PROGRESS NOTES
Dispensed Stiolto res samples x2.    Lot#: [id number]  Exp: [id number]    Provider: Tiffany Sands MD     Logged distribution of sample on data sheet.     Dispensed by: Dolores Ambriz

## 2019-11-04 ENCOUNTER — TELEPHONE (OUTPATIENT)
Dept: CARDIOLOGY | Facility: MEDICAL CENTER | Age: 59
End: 2019-11-04

## 2019-11-04 ENCOUNTER — TELEPHONE (OUTPATIENT)
Dept: HEMATOLOGY ONCOLOGY | Facility: MEDICAL CENTER | Age: 59
End: 2019-11-04

## 2019-11-04 NOTE — TELEPHONE ENCOUNTER
Returned pt call and pt concerns.  Instructed pt to contact [facility] and to fax clearance request to [phone].  She states she will contact them with instructions.  Reassurance given once clearance is received, request will be relayed to MD for advise.  Once advise is received, pt will be contacted to review MD recommendations.  She verbalizes understanding and is appreciative of information given.    Will await for clearance to be received.

## 2019-11-04 NOTE — TELEPHONE ENCOUNTER
CW    Pt is having cyst removed from thumb on 11/11. [facility] is requesting she hold blood thinner 5 days prior . Please call pt to advise at [phone].

## 2019-11-06 NOTE — TELEPHONE ENCOUNTER
She is safe to proceed, no testing required, hold antiplatelet as necessary but as short as possible.  Her main operative risk is related to her lung disease so clearance by pulmonary is recommended.    It is my pleasure to participate in the care of Ms. Grant Briggs.  Please do not hesitate to contact me with questions or concerns. [facility] is available 24/7 for consultative services at [phone] in the perioperative period.    Electronically Signed    Walker Adames MD PhD [facility]  Cardiologist [facility]    Please note that this dictation was created using voice recognition software. I have worked with consultants from the vendor as well as technical experts from [facility] to optimize the interface. I have made every reasonable attempt to correct obvious errors, but I expect that there are errors of grammar and possibly content I did not discover before finalizing the note.

## 2019-11-06 NOTE — TELEPHONE ENCOUNTER
Clearance request received from [facility]  Jeremiah (P:[phone] F: [phone]) requesting for cardiac clearance and medication instructions for upcoming procedure: right thumb mass excision on 11/11/2019.      Clearance request relayed to MD for advise.

## 2019-11-06 NOTE — TELEPHONE ENCOUNTER
Letter printed with MD recommendations and faxed to Jeremiah, [phone], completed status.    Called patient and pt requesting to reach her at [phone].  Called pt at requested number and reviewed MD recommendations.  She verbalizes understanding and states no other concerns or questions at this time.  Pt is appreciative of information given.    Clearance sent to scanning for reference.

## 2019-11-20 ENCOUNTER — OFFICE VISIT (OUTPATIENT)
Dept: CARDIOLOGY | Facility: MEDICAL CENTER | Age: 59
End: 2019-11-20
Payer: COMMERCIAL

## 2019-11-20 VITALS
WEIGHT: 207.8 LBS | OXYGEN SATURATION: 90 % | HEART RATE: 78 BPM | HEIGHT: 70 IN | DIASTOLIC BLOOD PRESSURE: 60 MMHG | BODY MASS INDEX: 29.75 KG/M2 | SYSTOLIC BLOOD PRESSURE: 120 MMHG

## 2019-11-20 DIAGNOSIS — I25.10 CORONARY ARTERY DISEASE DUE TO LIPID RICH PLAQUE: Chronic | ICD-10-CM

## 2019-11-20 DIAGNOSIS — I48.0 PAF (PAROXYSMAL ATRIAL FIBRILLATION) (HCC): Chronic | ICD-10-CM

## 2019-11-20 DIAGNOSIS — I25.10 CORONARY ARTERY DISEASE INVOLVING NATIVE CORONARY ARTERY OF NATIVE HEART WITHOUT ANGINA PECTORIS: ICD-10-CM

## 2019-11-20 DIAGNOSIS — R06.09 DYSPNEA ON EXERTION: ICD-10-CM

## 2019-11-20 DIAGNOSIS — I47.10 SVT (SUPRAVENTRICULAR TACHYCARDIA): ICD-10-CM

## 2019-11-20 DIAGNOSIS — I25.83 CORONARY ARTERY DISEASE DUE TO LIPID RICH PLAQUE: Chronic | ICD-10-CM

## 2019-11-20 PROCEDURE — 99214 OFFICE O/P EST MOD 30 MIN: CPT | Performed by: INTERNAL MEDICINE

## 2019-11-20 ASSESSMENT — ENCOUNTER SYMPTOMS
FEVER: 0
BRUISES/BLEEDS EASILY: 0
FOCAL WEAKNESS: 0
NAUSEA: 0
SORE THROAT: 0
COUGH: 0
BLURRED VISION: 0
DIZZINESS: 0
FALLS: 0
SHORTNESS OF BREATH: 1
CHILLS: 0
CLAUDICATION: 0
ABDOMINAL PAIN: 0
PALPITATIONS: 1
PND: 0
WEAKNESS: 0

## 2019-11-20 NOTE — LETTER
PROCEDURE/SURGERY CLEARANCE FORM      Encounter Date: 11/20/2019    Patient: Lc Briggs  YOB: 1960    CARDIOLOGIST:  Walker Adames M.D.    REFERRING DOCTOR:  [facility] [place]      The above patient is cleared to have the following procedure/surgery: thumb surgery                                           Additional comments: She is safe to proceed, no testing required, hold antiplatelet as necessary. Plavix and aspirin 81 for at least 5 days.  We are doing a PET stress test but the results are not necessary for surgery more for long term prognosis.     If she does not require general anesthesia for the procedure then pulmonary clearance is not required.    It is my pleasure to participate in the care of Ms. Grant Briggs.  Please do not hesitate to contact me with questions or concerns. [facility] is available 24/7 for consultative services at [phone] in the perioperative period.    Electronically Signed    Walker Adames MD PhD FAC  Cardiologist [facility]    Please note that this dictation was created using voice recognition software. I have worked with consultants from the vendor as well as technical experts from [facility] to optimize the interface. I have made every reasonable attempt to correct obvious errors, but I expect that there are errors of grammar and possibly content I did not discover before finalizing the note.

## 2019-11-20 NOTE — PROGRESS NOTES
"Chief Complaint   Patient presents with   • Coronary Artery Disease       Subjective:   Lc Briggs is a 59 y.o. female who presents today     Now working in the [employer]    She is hoping to have a excision from her thumb    S at he has a lot of stress related to financial things at home    Worried about chronic dyspnea she is having no coronary disease she is also reporting intermittent palpitations    Past Medical History:   Diagnosis Date   • Arthritis current    knees, hands, elbows   • ASTHMA 2008    recurring   • Atrial fibrillation ([facility])    • Breath shortness 02/25/2019    uses oxygen at 3 liters/min cont. \"Preferred\" oxygen company   • Bronchitis    • Cancer (Formerly [facility]) 1980s    Hodgkins lymphoma   • COPD with acute exacerbation ([facility]ly [facility]) 2/25/2015   • Coronary artery disease due to lipid rich plaque - moderate RCA FFR negative    • Diabetes ([facility])    • Dyslipidemia    • High cholesterol 02/25/2019   • Hypertension    • Indigestion current    tx with OTC rolaids   • MI (myocardial infarction) (Formerly [facility])    • Oxygen dependent    • PAF (paroxysmal atrial fibrillation) (Formerly [facility]) - s/p ablation 2015    • Persistent atrial fibrillation 2/9/2016   • Pneumonia    • Pulmonary emphysema ([facility])    • Pulmonary nodule    • Restless leg syndrome    • Sepsis ([facility]ly [facility]) 2/25/2015   • Sleep apnea      Past Surgical History:   Procedure Laterality Date   • CARDIAC CATH, RIGHT/LEFT HEART     • OTHER CARDIAC SURGERY     • WEDGE RESECTION LUNG       Family History   Problem Relation Age of Onset   • Heart Disease Mother    • Other Mother 72        pacemaker   • Lung Disease Father         severe asthma   • Alcohol/Drug Maternal Grandfather    • Lung Disease Paternal Grandmother      Social History     Socioeconomic History   • Marital status:      Spouse name: Not on file   • Number of children: Not on file   • Years of education: Not on file   • Highest education level: Not on file   Occupational History   • Not on file " "  Social Needs   • Financial resource strain: Not on file   • Food insecurity:     Worry: Not on file     Inability: Not on file   • Transportation needs:     Medical: Not on file     Non-medical: Not on file   Tobacco Use   • Smoking status: Former Smoker     Packs/day: 1.00     Years: 35.00     Pack years: 35.00     Types: Cigarettes     Last attempt to quit: 3/21/2013     Years since quittin.6   • Smokeless tobacco: Never Used   • Tobacco comment: quit 4 days ago, smoked less than 1ppd   Substance and Sexual Activity   • Alcohol use: No   • Drug use: No     Comment: former cocaine 'lots of it', former meth clean 9 years   • Sexual activity: Not on file   Lifestyle   • Physical activity:     Days per week: Not on file     Minutes per session: Not on file   • Stress: Not on file   Relationships   • Social connections:     Talks on phone: Not on file     Gets together: Not on file     Attends Mosque service: Not on file     Active member of club or organization: Not on file     Attends meetings of clubs or organizations: Not on file     Relationship status: Not on file   • Intimate partner violence:     Fear of current or ex partner: Not on file     Emotionally abused: Not on file     Physically abused: Not on file     Forced sexual activity: Not on file   Other Topics Concern   • Not on file   Social History Narrative   • Not on file     Allergies   Allergen Reactions   • Cephalexin Anaphylaxis     Rxn date: .   • Clindamycin Anaphylaxis     Rxn date: .   • Keflex Anaphylaxis   • Codeine Hives, Rash and Itching     Rxn date: \"As a child.\"   • Penicillins Hives, Rash and Itching     Rxn date: \"As a child.\"   • Rosuvastatin Myalgia     Outpatient Encounter Medications as of 2019   Medication Sig Dispense Refill   • Tiotropium Bromide-Olodaterol 2.5-2.5 MCG/ACT Aero Soln Take 2 Puffs by mouth every day. 2 Inhaler 0   • ipratropium-albuterol (DUONEB) 0.5-2.5 (3) MG/3ML nebulizer solution 3 mL by " Nebulization route every four hours as needed for Shortness of Breath. 120 mL 11   • fluticasone (FLOVENT HFA) 110 MCG/ACT Aerosol Inhale 2 Puffs by mouth 2 times a day. Use with spacer.  Rinse mouth after each use. 1 Inhaler 11   • guaiFENesin ER (MUCINEX) 600 MG TABLET SR 12 HR Take 1 Tab by mouth every 12 hours. 28 Tab 3   • albuterol (PROAIR HFA) 108 (90 Base) MCG/ACT Aero Soln inhalation aerosol Inhale 2 Puffs by mouth every four hours as needed for Shortness of Breath (wheezing). 1 Inhaler 1   • nitroglycerin (NITROSTAT) 0.4 MG SL Tab Place 1 Tab under tongue as needed for Chest Pain. 25 Tab 11   • metFORMIN (GLUCOPHAGE) 500 MG Tab Take 1 Tab by mouth 2 times a day. (Patient taking differently: Take 1,000 mg by mouth 2 times a day.) 60 Tab 0   • simvastatin (ZOCOR) 20 MG Tab simvastatin 20 mg tablet     • albuterol (ACCUNEB) 0.63 MG/3ML nebulizer solution albuterol sulfate     • OXYGEN-HELIUM INH oxygen     • atenolol (TENORMIN) 25 MG Tab Take 25 mg by mouth every day.     • aspirin (ASA) 81 MG Chew Tab chewable tablet Take 81 mg by mouth every day.     • [DISCONTINUED] clopidogrel (PLAVIX) 75 MG Tab Take 1 Tab by mouth every day. (Patient not taking: Reported on 11/20/2019) 90 Tab 3     No facility-administered encounter medications on file as of 11/20/2019.      Review of Systems   Constitutional: Negative for chills and fever.   HENT: Negative for sore throat.    Eyes: Negative for blurred vision.   Respiratory: Positive for shortness of breath. Negative for cough.    Cardiovascular: Positive for chest pain and palpitations. Negative for claudication, leg swelling and PND.   Gastrointestinal: Negative for abdominal pain and nausea.   Musculoskeletal: Negative for falls and joint pain.   Skin: Negative for rash.   Neurological: Negative for dizziness, focal weakness and weakness.   Endo/Heme/Allergies: Does not bruise/bleed easily.        Objective:   /60 (BP Location: Left arm, Patient Position:  "Sitting, BP Cuff Size: Adult)   Pulse 78   Ht 1.778 m (5' 10\")   Wt 94.3 kg (207 lb 12.8 oz)   LMP 01/01/2006   SpO2 90%   BMI 29.82 kg/m²      Physical Exam   Constitutional: No distress.   HENT:   Mouth/Throat: Oropharynx is clear and moist. No oropharyngeal exudate.   Eyes: No scleral icterus.   Neck: No JVD present.   Cardiovascular: Normal rate and normal heart sounds. Exam reveals no gallop and no friction rub.   No murmur heard.  Pulmonary/Chest: No respiratory distress. She has no wheezes. She has no rales.   Abdominal: Soft. Bowel sounds are normal.   Musculoskeletal:         General: No edema.   Neurological: She is alert.   Skin: No rash noted. She is not diaphoretic.   Psychiatric: She has a normal mood and affect.     We reviewed in person the most recent labs      Assessment:     1. Coronary artery disease due to lipid rich plaque - moderate RCA FFR negative 9/2015     2. PAF (paroxysmal atrial fibrillation) ([facility]) - s/p ablation 2015  NM-CARDIAC PET    Holter Monitor Study   3. SVT (supraventricular tachycardia) ([facility])  Holter Monitor Study   4. Coronary artery disease involving native coronary artery of native heart without angina pectoris  NM-CARDIAC PET   5. Dyspnea on exertion  NM-CARDIAC PET       Medical Decision Making:  Today's Assessment / Status / Plan:     It was my pleasure to meet with Ms. Grant Briggs.    Blood pressure is well controlled.      She is on appropriate statin.     we will get a PET scan to evaluate for ischemia overall is likely complex with long-standing pulmonary disease    We will get a 40-hour Holter monitor for palpitations    Despite the need for the stress test she could safely proceed with thumb surgery even under general anesthesia I believe a local block will be used because of this I do not think she necessarily needs pulmonary clearance but she will be seeing them soon for reevaluation    I will see Ms. Grant Briggs back in 6 months time and " encouraged her to follow up with us over the phone or electronically using my MyChart as issues arise.    It is my pleasure to participate in the care of Ms. Grant Briggs.  Please do not hesitate to contact me with questions or concerns.    Walker Adames MD PhD [facility]  Cardiologist [facility]    Please note that this dictation was created using voice recognition software. I have worked with consultants from the vendor as well as technical experts from [facility] to optimize the interface. I have made every reasonable attempt to correct obvious errors, but I expect that there are errors of grammar and possibly content I did not discover before finalizing the note.   All

## 2019-11-20 NOTE — LETTER
"     [facility]   [address]  [place] [address]  Phone: [phone]  Fax: [phone]              Lc Briggs  1960    Encounter Date: 11/20/2019    Walker Adames M.D.          PROGRESS NOTE:  Chief Complaint   Patient presents with   • Coronary Artery Disease       Subjective:   Lc Briggs is a 59 y.o. female who presents today     Now working in the [employer]    She is hoping to have a excision from her thumb    S at he has a lot of stress related to financial things at home    Worried about chronic dyspnea she is having no coronary disease she is also reporting intermittent palpitations    Past Medical History:   Diagnosis Date   • Arthritis current    knees, hands, elbows   • ASTHMA 2008    recurring   • Atrial fibrillation ([facility])    • Breath shortness 02/25/2019    uses oxygen at 3 liters/min cont. \"Preferred\" oxygen company   • Bronchitis    • Cancer ([facility]) 1980s    Hodgkins lymphoma   • COPD with acute exacerbation ([facility]) 2/25/2015   • Coronary artery disease due to lipid rich plaque - moderate RCA FFR negative    • Diabetes ([facility])    • Dyslipidemia    • High cholesterol 02/25/2019   • Hypertension    • Indigestion current    tx with OTC rolaids   • MI (myocardial infarction) ([facility])    • Oxygen dependent    • PAF (paroxysmal atrial fibrillation) ([facility]) - s/p ablation 2015    • Persistent atrial fibrillation 2/9/2016   • Pneumonia    • Pulmonary emphysema ([facility])    • Pulmonary nodule    • Restless leg syndrome    • Sepsis ([facility]) 2/25/2015   • Sleep apnea      Past Surgical History:   Procedure Laterality Date   • CARDIAC CATH, RIGHT/LEFT HEART     • OTHER CARDIAC SURGERY     • WEDGE RESECTION LUNG       Family History   Problem Relation Age of Onset   • Heart Disease Mother    • Other Mother 72        pacemaker   • Lung Disease Father         severe asthma   • Alcohol/Drug Maternal Grandfather    • Lung Disease " "Paternal Grandmother      Social History     Socioeconomic History   • Marital status:      Spouse name: Not on file   • Number of children: Not on file   • Years of education: Not on file   • Highest education level: Not on file   Occupational History   • Not on file   Social Needs   • Financial resource strain: Not on file   • Food insecurity:     Worry: Not on file     Inability: Not on file   • Transportation needs:     Medical: Not on file     Non-medical: Not on file   Tobacco Use   • Smoking status: Former Smoker     Packs/day: 1.00     Years: 35.00     Pack years: 35.00     Types: Cigarettes     Last attempt to quit: 3/21/2013     Years since quittin.6   • Smokeless tobacco: Never Used   • Tobacco comment: quit 4 days ago, smoked less than 1ppd   Substance and Sexual Activity   • Alcohol use: No   • Drug use: No     Comment: former cocaine 'lots of it', former meth clean 9 years   • Sexual activity: Not on file   Lifestyle   • Physical activity:     Days per week: Not on file     Minutes per session: Not on file   • Stress: Not on file   Relationships   • Social connections:     Talks on phone: Not on file     Gets together: Not on file     Attends Taoism service: Not on file     Active member of club or organization: Not on file     Attends meetings of clubs or organizations: Not on file     Relationship status: Not on file   • Intimate partner violence:     Fear of current or ex partner: Not on file     Emotionally abused: Not on file     Physically abused: Not on file     Forced sexual activity: Not on file   Other Topics Concern   • Not on file   Social History Narrative   • Not on file     Allergies   Allergen Reactions   • Cephalexin Anaphylaxis     Rxn date: .   • Clindamycin Anaphylaxis     Rxn date: .   • Keflex Anaphylaxis   • Codeine Hives, Rash and Itching     Rxn date: \"As a child.\"   • Penicillins Hives, Rash and Itching     Rxn date: \"As a child.\"   • Rosuvastatin Myalgia "     Outpatient Encounter Medications as of 11/20/2019   Medication Sig Dispense Refill   • Tiotropium Bromide-Olodaterol 2.5-2.5 MCG/ACT Aero Soln Take 2 Puffs by mouth every day. 2 Inhaler 0   • ipratropium-albuterol (DUONEB) 0.5-2.5 (3) MG/3ML nebulizer solution 3 mL by Nebulization route every four hours as needed for Shortness of Breath. 120 mL 11   • fluticasone (FLOVENT HFA) 110 MCG/ACT Aerosol Inhale 2 Puffs by mouth 2 times a day. Use with spacer.  Rinse mouth after each use. 1 Inhaler 11   • guaiFENesin ER (MUCINEX) 600 MG TABLET SR 12 HR Take 1 Tab by mouth every 12 hours. 28 Tab 3   • albuterol (PROAIR HFA) 108 (90 Base) MCG/ACT Aero Soln inhalation aerosol Inhale 2 Puffs by mouth every four hours as needed for Shortness of Breath (wheezing). 1 Inhaler 1   • nitroglycerin (NITROSTAT) 0.4 MG SL Tab Place 1 Tab under tongue as needed for Chest Pain. 25 Tab 11   • metFORMIN (GLUCOPHAGE) 500 MG Tab Take 1 Tab by mouth 2 times a day. (Patient taking differently: Take 1,000 mg by mouth 2 times a day.) 60 Tab 0   • simvastatin (ZOCOR) 20 MG Tab simvastatin 20 mg tablet     • albuterol (ACCUNEB) 0.63 MG/3ML nebulizer solution albuterol sulfate     • OXYGEN-HELIUM INH oxygen     • atenolol (TENORMIN) 25 MG Tab Take 25 mg by mouth every day.     • aspirin (ASA) 81 MG Chew Tab chewable tablet Take 81 mg by mouth every day.     • [DISCONTINUED] clopidogrel (PLAVIX) 75 MG Tab Take 1 Tab by mouth every day. (Patient not taking: Reported on 11/20/2019) 90 Tab 3     No facility-administered encounter medications on file as of 11/20/2019.      Review of Systems   Constitutional: Negative for chills and fever.   HENT: Negative for sore throat.    Eyes: Negative for blurred vision.   Respiratory: Positive for shortness of breath. Negative for cough.    Cardiovascular: Positive for chest pain and palpitations. Negative for claudication, leg swelling and PND.   Gastrointestinal: Negative for abdominal pain and nausea.    "  Musculoskeletal: Negative for falls and joint pain.   Skin: Negative for rash.   Neurological: Negative for dizziness, focal weakness and weakness.   Endo/Heme/Allergies: Does not bruise/bleed easily.        Objective:   /60 (BP Location: Left arm, Patient Position: Sitting, BP Cuff Size: Adult)   Pulse 78   Ht 1.778 m (5' 10\")   Wt 94.3 kg (207 lb 12.8 oz)   LMP 01/01/2006   SpO2 90%   BMI 29.82 kg/m²      Physical Exam   Constitutional: No distress.   HENT:   Mouth/Throat: Oropharynx is clear and moist. No oropharyngeal exudate.   Eyes: No scleral icterus.   Neck: No JVD present.   Cardiovascular: Normal rate and normal heart sounds. Exam reveals no gallop and no friction rub.   No murmur heard.  Pulmonary/Chest: No respiratory distress. She has no wheezes. She has no rales.   Abdominal: Soft. Bowel sounds are normal.   Musculoskeletal:         General: No edema.   Neurological: She is alert.   Skin: No rash noted. She is not diaphoretic.   Psychiatric: She has a normal mood and affect.     We reviewed in person the most recent labs      Assessment:     1. Coronary artery disease due to lipid rich plaque - moderate RCA FFR negative 9/2015     2. PAF (paroxysmal atrial fibrillation) ([facility]) - s/p ablation 2015  NM-CARDIAC PET    Holter Monitor Study   3. SVT (supraventricular tachycardia) ([facility])  Holter Monitor Study   4. Coronary artery disease involving native coronary artery of native heart without angina pectoris  NM-CARDIAC PET   5. Dyspnea on exertion  NM-CARDIAC PET       Medical Decision Making:  Today's Assessment / Status / Plan:     It was my pleasure to meet with Ms. Grant Briggs.    Blood pressure is well controlled.      She is on appropriate statin.     we will get a PET scan to evaluate for ischemia overall is likely complex with long-standing pulmonary disease    We will get a 40-hour Holter monitor for palpitations    Despite the need for the stress test she could safely proceed " with thumb surgery even under general anesthesia I believe a local block will be used because of this I do not think she necessarily needs pulmonary clearance but she will be seeing them soon for reevaluation    I will see Ms. Grant Briggs back in 6 months time and encouraged her to follow up with us over the phone or electronically using my MyChart as issues arise.    It is my pleasure to participate in the care of Ms. Grant Briggs.  Please do not hesitate to contact me with questions or concerns.    Walker Adames MD PhD [facility]  Cardiologist [facility]    Please note that this dictation was created using voice recognition software. I have worked with consultants from the vendor as well as technical experts from [facility] to optimize the interface. I have made every reasonable attempt to correct obvious errors, but I expect that there are errors of grammar and possibly content I did not discover before finalizing the note.   All      Gaetano Guardado M.D.  [address] [address]  VIA Facsimile: [phone]     Lito Foreman M.D.  [address]  [address] [address]  VIA Facsimile: [phone]

## 2019-12-03 ENCOUNTER — NON-PROVIDER VISIT (OUTPATIENT)
Dept: PULMONOLOGY | Facility: HOSPICE | Age: 59
End: 2019-12-03
Attending: INTERNAL MEDICINE
Payer: COMMERCIAL

## 2019-12-03 ENCOUNTER — NON-PROVIDER VISIT (OUTPATIENT)
Dept: CARDIOLOGY | Facility: MEDICAL CENTER | Age: 59
End: 2019-12-03
Payer: COMMERCIAL

## 2019-12-03 ENCOUNTER — OFFICE VISIT (OUTPATIENT)
Dept: PULMONOLOGY | Facility: HOSPICE | Age: 59
End: 2019-12-03
Payer: COMMERCIAL

## 2019-12-03 VITALS
RESPIRATION RATE: 16 BRPM | SYSTOLIC BLOOD PRESSURE: 126 MMHG | BODY MASS INDEX: 29.49 KG/M2 | OXYGEN SATURATION: 96 % | WEIGHT: 206 LBS | HEIGHT: 70 IN | TEMPERATURE: 97.5 F | HEART RATE: 72 BPM | DIASTOLIC BLOOD PRESSURE: 72 MMHG

## 2019-12-03 VITALS — WEIGHT: 206 LBS | BODY MASS INDEX: 29.56 KG/M2

## 2019-12-03 DIAGNOSIS — I47.10 SVT (SUPRAVENTRICULAR TACHYCARDIA): ICD-10-CM

## 2019-12-03 DIAGNOSIS — I48.0 PAF (PAROXYSMAL ATRIAL FIBRILLATION) (HCC): Chronic | ICD-10-CM

## 2019-12-03 DIAGNOSIS — I49.3 PVC (PREMATURE VENTRICULAR CONTRACTION): ICD-10-CM

## 2019-12-03 DIAGNOSIS — Z87.891 HISTORY OF TOBACCO USE: ICD-10-CM

## 2019-12-03 DIAGNOSIS — J44.9 CHRONIC OBSTRUCTIVE PULMONARY DISEASE, UNSPECIFIED COPD TYPE (HCC): ICD-10-CM

## 2019-12-03 PROCEDURE — 99214 OFFICE O/P EST MOD 30 MIN: CPT | Performed by: INTERNAL MEDICINE

## 2019-12-03 PROCEDURE — 94729 DIFFUSING CAPACITY: CPT | Performed by: INTERNAL MEDICINE

## 2019-12-03 PROCEDURE — 94060 EVALUATION OF WHEEZING: CPT | Performed by: INTERNAL MEDICINE

## 2019-12-03 PROCEDURE — 94726 PLETHYSMOGRAPHY LUNG VOLUMES: CPT | Performed by: INTERNAL MEDICINE

## 2019-12-03 PROCEDURE — 93224 XTRNL ECG REC UP TO 48 HRS: CPT | Performed by: INTERNAL MEDICINE

## 2019-12-03 ASSESSMENT — PULMONARY FUNCTION TESTS
FVC: 2.89
FEV1/FVC_PERCENT_CHANGE: 133
FEV1/FVC_PERCENT_PREDICTED: 58
FEV1/FVC_PREDICTED: 78
FVC_PERCENT_PREDICTED: 64
FEV1/FVC: 44
FEV1_PERCENT_CHANGE: 16
FEV1_PERCENT_PREDICTED: 36
FEV1/FVC: 44
FEV1/FVC_PERCENT_PREDICTED: 56
FEV1/FVC: 45.33
FEV1/FVC: 45
FEV1/FVC_PERCENT_PREDICTED: 56
FVC_PERCENT_PREDICTED: 72
FVC_PREDICTED: 3.98
FVC_LLN: 3.32
FEV1_PREDICTED: 3.07
FEV1_PERCENT_CHANGE: 12
FEV1/FVC_PERCENT_PREDICTED: 58
FEV1_LLN: 2.57
FEV1: 1.12
FEV1/FVC_PERCENT_LLN: 65
FVC: 2.56
FEV1: 1.31
FEV1_PERCENT_PREDICTED: 42
FEV1/FVC_PERCENT_PREDICTED: 77
FEV1/FVC_PERCENT_CHANGE: 3

## 2019-12-03 NOTE — PROCEDURES
Technician: Fara Landon RRT   Good patient effort & cooperation.  The results of this test meet the ATS/ERS standards for acceptability & reproducibility.  Test was performed on the [employer] Body Plethysmograph-Elite DX system.  Predicted values were [facility]-3 for spirometry, [facility] for DLCO, ITS for Lung Volumes.  The DLCO was uncorrected for Hgb.  A bronchodilator of Ventolin HFA -2puffs via spacer administered.  DLCO performed during dilation period.    Interpretation;   1.  Baseline spirometry shows severe airflow obstruction with FEV1 of 1.12 L or 36% predicted and FEV1/FVC ratio of 44.  2.  There is significant bronchodilator response with 16% improvement in FEV1 and 12% improvement in FVC following bronchodilator.  3.  Total lung capacity is within normal limits however there is significant air trapping with RV/TLC at 151% predicted.  4.  DLCO is mildly reduced at 69% predicted.  Pulmonary function test are consistent with moderate to severe COPD.  They are not significantly changed from pulmonary function test obtained in 2015.  Suggest clinical correlation.

## 2019-12-03 NOTE — NON-PROVIDER
Dispensed Stiolto 2.5mcg  samples x2.    Lot#: [id number]  Exp: 1/2021    Provider: RAMAKRISHNA Sands MD     Logged distribution of sample on data sheet.     Dispensed by: Dolores Ambriz

## 2019-12-03 NOTE — PROGRESS NOTES
Chief Complaint   Patient presents with   • COPD     last seen 10/29/19    • Results     PFT 60 12/3/19          HPI: This patient is a 59 y.o. female whom is followed in our clinic for copd last seen by me on 10/29/19.  Her past medical history includes mild obstructive sleep apnea, no longer on CPAP and OPO on 2LPM ordered at our last visit confirmed adequate oxygenation on this at night, atrial arrhythmia status post ablation in 2015 with ongoing evaluation and cardiology, coronary artery disease status post drug-eluting stent most recently in February 2019 to the RCA, type 2 diabetes, COPD and history of right lower lobe nodule status post resection.  The patient is a former tobacco user with greater than 35-pack-year history and quit in 2013.  Her COPD regimen includes Stiolto Respimat and Ventolin as needed.  Pulmonary function test in December 2017 at [place] showed an FEV1 of 0.81 L prebronchodilator with bronchodilator response and improvement to 1.19 L which is 35% of predicted, FEV1/FVC ratio 43, TLC of 116% predicted and a DLCO of 40% predicted.  Echocardiogram from November 2017 showed left ventricular ejection fraction of 60%, normal diastolic function and RVSP estimated at 36 mmHg.  We had planned to add Flovent to Stiolto at her last clinic visit and obtain updated pulmonary function test.  She was also planning to follow-up with cardiology regarding palpitations and erratic heartbeats.  She denies a history of recurrent exacerbations however has been treated with steroids and prednisone 2 times in the past 3 months.  She has not started the Flovent as recommended at our last appointment but feels her breathing is improved.  She is using the Ventolin infrequently and continues to walk regularly at her job [facility] [employer].  Her lung function test today show FEV1 prebronchodilator of 1.12 L or 36% of predicted and FEV1/FVC ratio 44.  She has a positive bronchodilator response.  Total lung  "capacity is within normal limits at 99% predicted however there is significant air trapping with RV/TLC of 151.  DLCO was mildly reduced at 69% predicted.  She was denied enrollment in lung cancer screening program and last CT chest was obtained in 2017 which per report showed no nodules or consolidation.    Past Medical History:   Diagnosis Date   • Arthritis current    knees, hands, elbows   • ASTHMA     recurring   • Atrial fibrillation ([facility])    • Breath shortness 2019    uses oxygen at 3 liters/min cont. \"[employer]erred\" oxygen company   • Bronchitis    • Cancer ([facility]) 1980s    Hodgkins lymphoma   • COPD with acute exacerbation ([facility]) 2015   • Coronary artery disease due to lipid rich plaque - moderate RCA FFR negative    • Diabetes ([facility])    • Dyslipidemia    • High cholesterol 2019   • Hypertension    • Indigestion current    tx with OTC rolaids   • MI (myocardial infarction) ([facility])    • Oxygen dependent    • PAF (paroxysmal atrial fibrillation) ([facility]) - s/p ablation     • Persistent atrial fibrillation 2016   • Pneumonia    • Pulmonary emphysema ([facility])    • Pulmonary nodule    • Restless leg syndrome    • Sepsis ([facility]) 2015   • Sleep apnea        Social History     Socioeconomic History   • Marital status:      Spouse name: Not on file   • Number of children: Not on file   • Years of education: Not on file   • Highest education level: Not on file   Occupational History   • Not on file   Social Needs   • Financial resource strain: Not on file   • Food insecurity:     Worry: Not on file     Inability: Not on file   • Transportation needs:     Medical: Not on file     Non-medical: Not on file   Tobacco Use   • Smoking status: Former Smoker     Packs/day: 1.00     Years: 35.00     Pack years: 35.00     Types: Cigarettes     Last attempt to quit: 3/21/2013     Years since quittin.7   • Smokeless tobacco: Never Used   • Tobacco comment: quit 4 days ago, smoked less than 1ppd "   Substance and Sexual Activity   • Alcohol use: No   • Drug use: No     Comment: former cocaine 'lots of it', former meth clean 9 years   • Sexual activity: Not on file   Lifestyle   • Physical activity:     Days per week: Not on file     Minutes per session: Not on file   • Stress: Not on file   Relationships   • Social connections:     Talks on phone: Not on file     Gets together: Not on file     Attends [employer] service: Not on file     Active member of club or organization: Not on file     Attends meetings of clubs or organizations: Not on file     Relationship status: Not on file   • Intimate partner violence:     Fear of current or ex partner: Not on file     Emotionally abused: Not on file     Physically abused: Not on file     Forced sexual activity: Not on file   Other Topics Concern   • Not on file   Social History Narrative   • Not on file       Family History   Problem Relation Age of Onset   • Heart Disease Mother    • Other Mother 72        pacemaker   • Lung Disease Father         severe asthma   • Alcohol/Drug Maternal Grandfather    • Lung Disease Paternal Grandmother        Current Outpatient Medications on File Prior to Visit   Medication Sig Dispense Refill   • Tiotropium Bromide-Olodaterol 2.5-2.5 MCG/ACT Aero Soln Take 2 Puffs by mouth every day. 2 Inhaler 0   • ipratropium-albuterol (DUONEB) 0.5-2.5 (3) MG/3ML nebulizer solution 3 mL by Nebulization route every four hours as needed for Shortness of Breath. 120 mL 11   • guaiFENesin ER (MUCINEX) 600 MG TABLET SR 12 HR Take 1 Tab by mouth every 12 hours. 28 Tab 3   • albuterol (PROAIR HFA) 108 (90 Base) MCG/ACT Aero Soln inhalation aerosol Inhale 2 Puffs by mouth every four hours as needed for Shortness of Breath (wheezing). 1 Inhaler 1   • nitroglycerin (NITROSTAT) 0.4 MG SL Tab Place 1 Tab under tongue as needed for Chest Pain. 25 Tab 11   • metFORMIN (GLUCOPHAGE) 500 MG Tab Take 1 Tab by mouth 2 times a day. (Patient taking differently:  "Take 1,000 mg by mouth 2 times a day.) 60 Tab 0   • simvastatin (ZOCOR) 20 MG Tab simvastatin 20 mg tablet     • albuterol (ACCUNEB) 0.63 MG/3ML nebulizer solution albuterol sulfate     • atenolol (TENORMIN) 25 MG Tab Take 25 mg by mouth every day.     • fluticasone (FLOVENT HFA) 110 MCG/ACT Aerosol Inhale 2 Puffs by mouth 2 times a day. Use with spacer.  Rinse mouth after each use. (Patient not taking: Reported on 12/3/2019) 1 Inhaler 11   • OXYGEN-HELIUM INH oxygen     • aspirin (ASA) 81 MG Chew Tab chewable tablet Take 81 mg by mouth every day.       No current facility-administered medications on file prior to visit.        Cephalexin; Clindamycin; Keflex; Lisinopril; Codeine; Penicillins; and Rosuvastatin      ROS:   ROS    /72 (BP Location: Left arm, Patient Position: Sitting, BP Cuff Size: Adult)   Pulse 72   Temp 36.4 °C (97.5 °F) (Temporal)   Resp 16   Ht 1.765 m (5' 9.5\")   Wt 93.4 kg (206 lb)   SpO2 96%   Physical Exam  Constitutional:       Appearance: She is well-developed.   HENT:      Head: Normocephalic and atraumatic.      Right Ear: External ear normal.      Left Ear: External ear normal.      Nose: Nose normal.      Mouth/Throat:      Pharynx: Oropharynx is clear. No oropharyngeal exudate.   Eyes:      General: No scleral icterus.     Extraocular Movements: Extraocular movements intact.      Conjunctiva/sclera: Conjunctivae normal.      Pupils: Pupils are equal, round, and reactive to light.   Neck:      Musculoskeletal: Neck supple.      Vascular: No JVD.      Trachea: No tracheal deviation.   Cardiovascular:      Rate and Rhythm: Normal rate and regular rhythm.      Heart sounds: Normal heart sounds. No murmur. No friction rub. No gallop.    Pulmonary:      Effort: Pulmonary effort is normal. No accessory muscle usage or respiratory distress.      Breath sounds: Normal breath sounds. No wheezing or rales.   Abdominal:      General: There is no distension.      Palpations: Abdomen is " soft.      Tenderness: There is no tenderness.   Musculoskeletal: Normal range of motion.         General: No tenderness or deformity.   Lymphadenopathy:      Cervical: No cervical adenopathy.   Skin:     General: Skin is warm and dry.      Findings: No rash.      Nails: There is no clubbing.     Neurological:      Mental Status: She is alert and oriented to person, place, and time.      Cranial Nerves: No cranial nerve deficit.      Gait: Gait normal.   Psychiatric:         Mood and Affect: Mood normal.         Behavior: Behavior normal.         PFTs as reviewed by me personally: as per HPI    Imaging as reviewed by me personally:  As per HPI    Assessment:  1. Chronic obstructive pulmonary disease, unspecified COPD type ([facility])  CT-CHEST (THORAX) W/O   2. History of tobacco use         Plan:  1.  Patient with moderate to severe disease based on pulmonary function test.  I do think she would benefit from inhaled corticosteroid although she feels symptoms are well controlled at this point.  We will continue Stiolto and as needed Ventolin.  She declined pulmonary rehab given that she is currently active at work.  She is tobacco free for the past 13 years.  She has declined vaccines in clinic today.  She understands the risks associated with this.  We will continue Stiolto and Ventolin and see her back in 6 months or sooner if symptoms develop.  2.  Patient is tobacco free she believes since 2010 which would make her tobacco free for only 9 years.  I do recommend at least one follow-up CT chest given her history of lung nodules and tobacco use.  Patient is amenable to this.  I encouraged ongoing abstinence from tobacco.  Return in about 6 months (around 6/3/2020) for copd.

## 2019-12-04 ENCOUNTER — TELEPHONE (OUTPATIENT)
Dept: CARDIOLOGY | Facility: MEDICAL CENTER | Age: 59
End: 2019-12-04

## 2019-12-04 NOTE — TELEPHONE ENCOUNTER
JACKIE Boyer in authorizations is asking for a call back to discuss a p2p. She can be reached at [phone].

## 2019-12-06 LAB — EKG IMPRESSION: NORMAL

## 2019-12-06 NOTE — TELEPHONE ENCOUNTER
Spoke with Dr Singh he approved test but could not give the authorization # will have to forward on in their system but should be approved by this afternoon or latest on Monday, please call back to get the authorization number    Agreed PET perfusion test is appropriate    It is my pleasure to participate in the care of Ms. Grant Briggs.  Please do not hesitate to contact me with questions or concerns.    Walker Adames MD PhD [facility]  Cardiologist [facility]    Please note that this dictation was created using voice recognition software. I have worked with consultants from the vendor as well as technical experts from [facility] to optimize the interface. I have made every reasonable attempt to correct obvious errors, but I expect that there are errors of grammar and possibly content I did not discover before finalizing the note.

## 2019-12-06 NOTE — TELEPHONE ENCOUNTER
"Returned Rosalind's phone call and reviewed findings.  Instructions to call [phone] with tracking number: [id number].    Called [phone] and phone call transferred to Dr. Dupont.  Spoke to Dr. Dupont regarding findings.  He states the PET scan was denied due to:    1) Didn't receive recent ekg  2) No documentation to support that pt cannot have exercise treadmill test performed instead.    Dr. Dupont continued to state, \"the PET scan is most complex testing and there are other testing available.\"  Informed Dr. Dupont that Holter Monitor is just received and am awaiting for results to be finalized.  Clarification relayed to Dr. uDpont if Holter results once finalized will be accepted instead of EKG.  Dr. Dupont states no, it would no be accepted.    Findings relayed to MD for advise.  "

## 2019-12-09 ENCOUNTER — TELEPHONE (OUTPATIENT)
Dept: CARDIOLOGY | Facility: MEDICAL CENTER | Age: 59
End: 2019-12-09

## 2019-12-09 NOTE — TELEPHONE ENCOUNTER
Result Notes for Holter Monitor Study   Notes recorded by Walker Adames M.D. on 12/6/2019 at 4:19 PM PST    The holter test looks good, please let her know     Thank you     Letter printed with MD recommendations and mailed to pt mailing address.

## 2019-12-09 NOTE — TELEPHONE ENCOUNTER
Returned Rosalind's phone call and reviewed findings.  She verbalizes understanding and states no other concerns or questions at this time.  Pt is appreciative of information given.

## 2019-12-11 ENCOUNTER — APPOINTMENT (OUTPATIENT)
Dept: RADIOLOGY | Facility: MEDICAL CENTER | Age: 59
End: 2019-12-11
Attending: INTERNAL MEDICINE
Payer: COMMERCIAL

## 2019-12-17 ENCOUNTER — HOSPITAL ENCOUNTER (OUTPATIENT)
Dept: RADIOLOGY | Facility: MEDICAL CENTER | Age: 59
End: 2019-12-17
Attending: INTERNAL MEDICINE
Payer: COMMERCIAL

## 2019-12-17 DIAGNOSIS — J44.9 CHRONIC OBSTRUCTIVE PULMONARY DISEASE, UNSPECIFIED COPD TYPE (HCC): ICD-10-CM

## 2019-12-17 PROCEDURE — 71250 CT THORAX DX C-: CPT

## 2019-12-18 ENCOUNTER — TELEPHONE (OUTPATIENT)
Dept: CARDIOLOGY | Facility: MEDICAL CENTER | Age: 59
End: 2019-12-18

## 2019-12-18 ENCOUNTER — APPOINTMENT (OUTPATIENT)
Dept: RADIOLOGY | Facility: MEDICAL CENTER | Age: 59
End: 2019-12-18
Attending: INTERNAL MEDICINE
Payer: COMMERCIAL

## 2019-12-18 NOTE — TELEPHONE ENCOUNTER
CW      Patient calling to say her PET scan was denied so she had to cancel it. She said she is having chest pains daily. She is asking if other testing can be done while waiting for PET to be approved. She can be reached at [phone] or [phone].

## 2019-12-19 NOTE — TELEPHONE ENCOUNTER
Returned pt call and reviewed findings.  She states she received mail saying it was denied.  Pt is requesting to return this call in 10 minutes.  Pt is requesting to have scan done prior to the end of the year.  Reassurance given that request to will be relayed to PAR to schedule pt for PET scan.  She verbalizes understanding and states no other concerns or questions at this time.  Pt is appreciative of information given.    Task deferred to PAR to schedule pt for PET Scan

## 2020-02-21 ENCOUNTER — TELEPHONE (OUTPATIENT)
Dept: CARDIOLOGY | Facility: MEDICAL CENTER | Age: 60
End: 2020-02-21

## 2020-03-02 ENCOUNTER — OFFICE VISIT (OUTPATIENT)
Dept: CARDIOLOGY | Facility: MEDICAL CENTER | Age: 60
End: 2020-03-02
Payer: COMMERCIAL

## 2020-03-02 VITALS
WEIGHT: 204 LBS | SYSTOLIC BLOOD PRESSURE: 114 MMHG | HEIGHT: 72 IN | OXYGEN SATURATION: 92 % | BODY MASS INDEX: 27.63 KG/M2 | HEART RATE: 64 BPM | DIASTOLIC BLOOD PRESSURE: 58 MMHG

## 2020-03-02 DIAGNOSIS — E78.5 DYSLIPIDEMIA: Chronic | ICD-10-CM

## 2020-03-02 DIAGNOSIS — I48.0 PAF (PAROXYSMAL ATRIAL FIBRILLATION) (HCC): Chronic | ICD-10-CM

## 2020-03-02 DIAGNOSIS — Z86.79 S/P RADIOFREQUENCY ABLATION OPERATION FOR ARRHYTHMIA: ICD-10-CM

## 2020-03-02 DIAGNOSIS — M13.0 POLYARTICULAR ARTHRITIS: ICD-10-CM

## 2020-03-02 DIAGNOSIS — I47.10 SVT (SUPRAVENTRICULAR TACHYCARDIA) (HCC): ICD-10-CM

## 2020-03-02 DIAGNOSIS — I20.0 UNSTABLE ANGINA (HCC): ICD-10-CM

## 2020-03-02 DIAGNOSIS — Z95.5 PRESENCE OF DRUG COATED STENT IN RIGHT CORONARY ARTERY: Chronic | ICD-10-CM

## 2020-03-02 DIAGNOSIS — I25.83 CORONARY ARTERY DISEASE DUE TO LIPID RICH PLAQUE: Chronic | ICD-10-CM

## 2020-03-02 DIAGNOSIS — Z98.890 S/P RADIOFREQUENCY ABLATION OPERATION FOR ARRHYTHMIA: ICD-10-CM

## 2020-03-02 DIAGNOSIS — I25.10 CORONARY ARTERY DISEASE DUE TO LIPID RICH PLAQUE: Chronic | ICD-10-CM

## 2020-03-02 PROCEDURE — 99214 OFFICE O/P EST MOD 30 MIN: CPT | Performed by: INTERNAL MEDICINE

## 2020-03-02 RX ORDER — NITROGLYCERIN 0.4 MG/1
0.4 TABLET SUBLINGUAL PRN
Qty: 25 TAB | Refills: 11 | Status: SHIPPED | OUTPATIENT
Start: 2020-03-02 | End: 2020-03-02 | Stop reason: SDUPTHER

## 2020-03-02 RX ORDER — NITROGLYCERIN 0.4 MG/1
0.4 TABLET SUBLINGUAL PRN
Qty: 25 TAB | Refills: 11 | Status: SHIPPED | OUTPATIENT
Start: 2020-03-02 | End: 2021-08-06

## 2020-03-02 ASSESSMENT — ENCOUNTER SYMPTOMS
ABDOMINAL PAIN: 0
CLAUDICATION: 0
FEVER: 0
SHORTNESS OF BREATH: 0
BLURRED VISION: 0
FALLS: 0
SORE THROAT: 0
COUGH: 0
PND: 0
FOCAL WEAKNESS: 0
DIZZINESS: 0
WEAKNESS: 0
MYALGIAS: 1
PALPITATIONS: 0
NAUSEA: 0
CHILLS: 0
BRUISES/BLEEDS EASILY: 0

## 2020-03-02 ASSESSMENT — FIBROSIS 4 INDEX: FIB4 SCORE: 1.01

## 2020-03-02 NOTE — PROGRESS NOTES
"Chief Complaint   Patient presents with   • Coronary Artery Disease     follow up       Subjective:   Lc Briggs is a 59 y.o. female who presents today for follow-up of her history of coronary disease with unstable angina in the setting of severe pulmonary disease    Last year unfortunately she did not get the PET scan is recommended because of her insurance I had done the prior authorization but it was not scheduled 1 time she now has new insurance high deductible    She is taking nitroglycerin couple times per month    She follows closely with pulmonary as well    Past Medical History:   Diagnosis Date   • Arthritis current    knees, hands, elbows   • ASTHMA 2008    recurring   • Atrial fibrillation ([facility])    • Breath shortness 02/25/2019    uses oxygen at 3 liters/min cont. \"Preferred\" oxygen company   • Bronchitis    • Cancer ([facility]) 1980s    Hodgkins lymphoma   • COPD with acute exacerbation ([facility]) 2/25/2015   • Coronary artery disease due to lipid rich plaque - moderate RCA FFR negative    • Diabetes ([facility])    • Dyslipidemia    • High cholesterol 02/25/2019   • Hypertension    • Indigestion current    tx with OTC rolaids   • MI (myocardial infarction) ([facility])    • Oxygen dependent    • PAF (paroxysmal atrial fibrillation) ([facility]) - s/p ablation 2015    • Persistent atrial fibrillation 2/9/2016   • Pneumonia    • Pulmonary emphysema ([facility])    • Pulmonary nodule    • Restless leg syndrome    • Sepsis ([facility]) 2/25/2015   • Sleep apnea      Past Surgical History:   Procedure Laterality Date   • CARDIAC CATH, RIGHT/LEFT HEART     • OTHER CARDIAC SURGERY     • WEDGE RESECTION LUNG       Family History   Problem Relation Age of Onset   • Heart Disease Mother    • Other Mother 72        pacemaker   • Lung Disease Father         severe asthma   • Alcohol/Drug Maternal Grandfather    • Lung Disease Paternal Grandmother      Social History     Socioeconomic History   • Marital status:      Spouse name: Not on " "file   • Number of children: Not on file   • Years of education: Not on file   • Highest education level: Not on file   Occupational History   • Not on file   Social Needs   • Financial resource strain: Not on file   • Food insecurity     Worry: Not on file     Inability: Not on file   • Transportation needs     Medical: Not on file     Non-medical: Not on file   Tobacco Use   • Smoking status: Former Smoker     Packs/day: 1.00     Years: 35.00     Pack years: 35.00     Types: Cigarettes     Last attempt to quit: 3/21/2013     Years since quittin.9   • Smokeless tobacco: Never Used   • Tobacco comment: quit 4 days ago, smoked less than 1ppd   Substance and Sexual Activity   • Alcohol use: No   • Drug use: No     Comment: former cocaine 'lots of it', former meth clean 9 years   • Sexual activity: Not on file   Lifestyle   • Physical activity     Days per week: Not on file     Minutes per session: Not on file   • Stress: Not on file   Relationships   • Social connections     Talks on phone: Not on file     Gets together: Not on file     Attends Bahai service: Not on file     Active member of club or organization: Not on file     Attends meetings of clubs or organizations: Not on file     Relationship status: Not on file   • Intimate partner violence     Fear of current or ex partner: Not on file     Emotionally abused: Not on file     Physically abused: Not on file     Forced sexual activity: Not on file   Other Topics Concern   • Not on file   Social History Narrative   • Not on file     Allergies   Allergen Reactions   • Cephalexin Anaphylaxis     Rxn date: .   • Clindamycin Anaphylaxis     Rxn date: .   • Keflex Anaphylaxis   • Lisinopril    • Codeine Hives, Rash and Itching     Rxn date: \"As a child.\"   • Penicillins Hives, Rash and Itching     Rxn date: \"As a child.\"   • Rosuvastatin Myalgia     Outpatient Encounter Medications as of 3/2/2020   Medication Sig Dispense Refill   • nitroglycerin " (NITROSTAT) 0.4 MG SL Tab Place 1 Tab under tongue as needed for Chest Pain. 25 Tab 11   • ipratropium-albuterol (DUONEB) 0.5-2.5 (3) MG/3ML nebulizer solution 3 mL by Nebulization route every four hours as needed for Shortness of Breath. 120 mL 11   • guaiFENesin ER (MUCINEX) 600 MG TABLET SR 12 HR Take 1 Tab by mouth every 12 hours. 28 Tab 3   • metFORMIN (GLUCOPHAGE) 500 MG Tab Take 1 Tab by mouth 2 times a day. 60 Tab 0   • simvastatin (ZOCOR) 20 MG Tab simvastatin 20 mg tablet     • albuterol (ACCUNEB) 0.63 MG/3ML nebulizer solution albuterol sulfate     • atenolol (TENORMIN) 25 MG Tab Take 25 mg by mouth every day.     • aspirin (ASA) 81 MG Chew Tab chewable tablet Take 81 mg by mouth every day.     • [DISCONTINUED] nitroglycerin (NITROSTAT) 0.4 MG SL Tab Place 1 Tab under tongue as needed for Chest Pain. 25 Tab 11   • Tiotropium Bromide-Olodaterol (STIOLTO RESPIMAT) 2.5-2.5 MCG/ACT Aero Soln Inhale 2 Puffs by mouth every day. (Patient not taking: Reported on 3/2/2020) 2 Inhaler 0   • fluticasone (FLOVENT HFA) 110 MCG/ACT Aerosol Inhale 2 Puffs by mouth 2 times a day. Use with spacer.  Rinse mouth after each use. (Patient not taking: Reported on 12/3/2019) 1 Inhaler 11   • albuterol (PROAIR HFA) 108 (90 Base) MCG/ACT Aero Soln inhalation aerosol Inhale 2 Puffs by mouth every four hours as needed for Shortness of Breath (wheezing). (Patient not taking: Reported on 3/2/2020) 1 Inhaler 1   • [DISCONTINUED] nitroglycerin (NITROSTAT) 0.4 MG SL Tab Place 1 Tab under tongue as needed for Chest Pain. 25 Tab 11   • OXYGEN-HELIUM INH oxygen       No facility-administered encounter medications on file as of 3/2/2020.      Review of Systems   Constitutional: Negative for chills and fever.   HENT: Negative for sore throat.    Eyes: Negative for blurred vision.   Respiratory: Negative for cough and shortness of breath.    Cardiovascular: Negative for chest pain, palpitations, claudication, leg swelling and PND.    Gastrointestinal: Negative for abdominal pain and nausea.   Musculoskeletal: Positive for joint pain and myalgias. Negative for falls.   Skin: Negative for rash.   Neurological: Negative for dizziness, focal weakness and weakness.   Endo/Heme/Allergies: Does not bruise/bleed easily.        Objective:   /58 (BP Location: Left arm, Patient Position: Sitting)   Pulse 64   Ht 1.829 m (6')   Wt 92.5 kg (204 lb)   LMP 2006   SpO2 92%   BMI 27.67 kg/m²     Physical Exam   Constitutional: No distress.   HENT:   Mouth/Throat: Oropharynx is clear and moist. No oropharyngeal exudate.   Eyes: No scleral icterus.   Neck: No JVD present.   Cardiovascular: Normal rate and normal heart sounds. Exam reveals no gallop and no friction rub.   No murmur heard.  Pulmonary/Chest: No respiratory distress. She has no wheezes. She has no rales.   Features of lung disease   Abdominal: Soft. Bowel sounds are normal.   Musculoskeletal:         General: No edema.   Neurological: She is alert.   Skin: No rash noted. She is not diaphoretic.   Psychiatric: She has a normal mood and affect.     Results for orders placed or performed during the hospital encounter of 19   EKG STAT   Result Value Ref Range    Report       [facility]    Test Date:  2019  Pt Name:    KATHY PHILLIPS      Department: [facility]  MRN:        [id number]                      Room:       [facility]  Gender:     Female                       Technician: CHARLES  :        1960                   Requested By:PATRICIA CUMMINGS  Order #:    [id number]                    Reading MD: Stan French MD    Measurements  Intervals                                Axis  Rate:       58                           P:          59  ID:         [id number]                          QRS:        68  QRSD:       85                           T:          74  QT:         449  QTc:        442    Interpretive Statements  SINUS BRADYCARDIA  PROLONGED ID INTERVAL  LOW VOLTAGE,  PRECORDIAL LEADS  Compared to ECG 2019 11:37:37  Low QRS voltage now present  Sinus rhythm no longer present  Atrial premature complex(es) no longer present    Electronically Signed On 2019 13:09:52 PST by Stan French MD         Results for orders placed or performed in visit on 18   [facility] NIDA EKG (Clinic Performed)   Result Value Ref Range    Report       [facility]    Test Date:  2018  Pt Name:    KATHY NGUYỄN              Department: [facility]  MRN:        [id number]                      Room:  Gender:     Female                       Technician: CARLOS MANUEL  :        1960                   Requested By:KARAN PADILLA  Order #:    [id number]                    Reading MD: Becca Yoon MD    Measurements  Intervals                                Axis  Rate:       60                           P:          80  NJ:         208                          QRS:        75  QRSD:       78                           T:          75  QT:         436  QTc:        436    Interpretive Statements  SINUS RHYTHM  1ST DEGREE AV BLOCK  NONSPECIFIC T ABNORMALITIES, ANT-LAT LEADS  Compared to ECG 2017 11:00:42  No significant change noted  Electronically Signed On 3-9-2018 17:24:31 PST by Becca Yoon MD       We reviewed in person the most recent labs  Recent Results (from the past 4032 hour(s))   Holter Monitor Study    Collection Time: 19 11:14 AM   Result Value Ref Range    Report       [facility]    Test Date:  2019  Pt Name:    KATHY DELMA PHILLIPS      Department: [facility]  MRN:        [id number]                      Room:  Gender:     Female                       Technician: Rula  :        1960                   Requested By:WALKER ADAMES  Order #:    [id number]                    Reading MD: Walker Adames MD      Interpretive Statements  SUMMARY  normal sinus rhythm  rare ventricular ectopy  occasional atrial ectopy including atrial runs  up to 5 beats  no symptoms reported  **-**-**-**-**-**-**-**-**-**-**-**-**-**-**-**  *  Monitoring started at 11:14 AM and continued for 48 hours. Cardiac rhythm  is Sinus. The average heart rate was 86 BPM.  The minimum heart rate was 60 BPM, occurring at 5:40:26 AM D1. The  maximum heart rate was 122 BPM, occurring  at 9:05:25 AM D1. The longest R-R interval was 1.7 seconds occurring at  3:42:58 AM D2.  *  Ventricular ectopic activity consisted of 18 single multifocal PVCs, 15  single mu ltifocal endiastolic ventricular beats,  4 ventricular escape beats.  *  The patient's rhythm included 7 hours 26 minutes 58 seconds of sinus  tachycardia. The fastest single episode occurred  at 9:05:22 AM D1, lasting 33 seconds, with maximum heart rate of 122 BPM.  *  Supraventricular ectopic activity consisted of 6 atrial runs in which the  longest run occurred at 8:43:49 PM D2 consisting  of 5 beats, with maximum heart rate of 140 BPM. The fastest run occurred  at 6:49:52 PM D1, consisting of 3 beats, with  maximum heart rate of 171 BPM, 57 atrial couplets, 23 late beats,  1,794 single PACs, 8 were in trigeminy.    *  Diary entries - no symptoms listed in diary.      Electronically Signed On 12-6-2019 15:57:56 PST by Walker Adames MD         Assessment:     1. PAF (paroxysmal atrial fibrillation) ([facility]) - s/p ablation 2015     2. Coronary artery disease due to lipid rich plaque - moderate RCA FFR negative 9/2015  NM-CARDIAC PET    Comp Metabolic Panel    Lipid Profile    CBC WITH DIFFERENTIAL   3. SVT (supraventricular tachycardia) ([facility])     4. S/P radiofrequency ablation operation for arrhythmia     5. Presence of drug coated stent in right coronary artery 2019  NM-CARDIAC PET   6. Dyslipidemia  Lipid Profile   7. Unstable angina ([facility])  NM-CARDIAC PET   8. Polyarticular arthritis  Sed Rate       Medical Decision Making:  Today's Assessment / Status / Plan:     It was my pleasure to meet with Ms. Grant Briggs.    She  is taking the nitroglycerin little bit more frequently recently should be a good candidate for a cardiac PET so I replaced the order in for unstable angina with her known history of coronary artery disease she is unable to exercise on a treadmill given her severe lung disease she is O2 dependent and would not have any exertional tolerance.  Despite our best efforts this was not accomplished last year she did receive authorization after I did a peer to peer unnecessarily but in any case it did not get done    For her polyarthralgias we should get a sed rate to be sure is other rheumatologic complaints    Provided prescription for nitroglycerin to be taken as needed    I will see Ms. Grant Briggs back in 6 months time and encouraged her to follow up with us over the phone or electronically using my [employer] as issues arise.    It is my pleasure to participate in the care of Ms. Grant Briggs.  Please do not hesitate to contact me with questions or concerns.    Walker Adames MD PhD [facility]  Cardiologist [facility]    Please note that this dictation was created using voice recognition software. I have worked with consultants from the vendor as well as technical experts from [facility] to optimize the interface. I have made every reasonable attempt to correct obvious errors, but I expect that there are errors of grammar and possibly content I did not discover before finalizing the note.

## 2020-07-15 ENCOUNTER — HOSPITAL ENCOUNTER (OUTPATIENT)
Dept: RADIOLOGY | Facility: MEDICAL CENTER | Age: 60
End: 2020-07-15
Attending: FAMILY MEDICINE
Payer: COMMERCIAL

## 2020-07-15 DIAGNOSIS — N63.15 BREAST LUMP ON RIGHT SIDE AT 3 O'CLOCK POSITION: ICD-10-CM

## 2020-07-15 PROCEDURE — 76642 ULTRASOUND BREAST LIMITED: CPT | Mod: RT

## 2020-07-15 PROCEDURE — G0279 TOMOSYNTHESIS, MAMMO: HCPCS

## 2020-09-25 ENCOUNTER — OFFICE VISIT (OUTPATIENT)
Dept: PULMONOLOGY | Facility: HOSPICE | Age: 60
End: 2020-09-25
Payer: COMMERCIAL

## 2020-09-25 VITALS
WEIGHT: 202 LBS | HEIGHT: 72 IN | OXYGEN SATURATION: 95 % | HEART RATE: 68 BPM | BODY MASS INDEX: 27.36 KG/M2 | SYSTOLIC BLOOD PRESSURE: 110 MMHG | RESPIRATION RATE: 16 BRPM | DIASTOLIC BLOOD PRESSURE: 60 MMHG

## 2020-09-25 DIAGNOSIS — J44.9 CHRONIC OBSTRUCTIVE PULMONARY DISEASE, UNSPECIFIED COPD TYPE (HCC): ICD-10-CM

## 2020-09-25 DIAGNOSIS — Z87.891 HISTORY OF TOBACCO USE: ICD-10-CM

## 2020-09-25 DIAGNOSIS — J96.11 CHRONIC RESPIRATORY FAILURE WITH HYPOXIA (HCC): ICD-10-CM

## 2020-09-25 PROCEDURE — 99214 OFFICE O/P EST MOD 30 MIN: CPT | Performed by: INTERNAL MEDICINE

## 2020-09-25 ASSESSMENT — ENCOUNTER SYMPTOMS
NECK PAIN: 0
DIZZINESS: 0
SORE THROAT: 0
PND: 0
CLAUDICATION: 0
HEMOPTYSIS: 0
SHORTNESS OF BREATH: 1
HEARTBURN: 0
HEADACHES: 0
WHEEZING: 0
DIAPHORESIS: 0
VOMITING: 0
EYE REDNESS: 0
PHOTOPHOBIA: 0
FOCAL WEAKNESS: 0
BLURRED VISION: 0
STRIDOR: 0
WEIGHT LOSS: 0
WEAKNESS: 0
NAUSEA: 0
PALPITATIONS: 0
SPEECH CHANGE: 0
FEVER: 0
EYE PAIN: 0
FALLS: 0
MYALGIAS: 0
DIARRHEA: 0
ORTHOPNEA: 0
SPUTUM PRODUCTION: 0
CHILLS: 0
EYE DISCHARGE: 0
BACK PAIN: 0
DOUBLE VISION: 0
TREMORS: 0
ABDOMINAL PAIN: 0
DEPRESSION: 0
CONSTIPATION: 0
SINUS PAIN: 0
COUGH: 0

## 2020-09-25 ASSESSMENT — FIBROSIS 4 INDEX: FIB4 SCORE: 1.01
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
## 2020-10-23 ENCOUNTER — TELEPHONE (OUTPATIENT)
Dept: PULMONOLOGY | Facility: HOSPICE | Age: 60
End: 2020-10-23

## 2020-10-23 DIAGNOSIS — J44.9 CHRONIC OBSTRUCTIVE PULMONARY DISEASE, UNSPECIFIED COPD TYPE (HCC): ICD-10-CM

## 2020-10-23 NOTE — TELEPHONE ENCOUNTER
Patient left  requesting a call back. No further information was left.     I spoke to the patient states Nebulizer machine old and needs a new one. [facility]. I informed the patient nebulzier order was sent back on 10/29/19. Per pt DME did not receive, and she didn't receive a new nebulizer. Informed the patient will placed updated order and send to [facility]. Patient aware we are moving and will receive a call next week or before I leave today.     Last seen 9/28/2020 Dr. Sands

## 2020-11-02 ENCOUNTER — OFFICE VISIT (OUTPATIENT)
Dept: CARDIOLOGY | Facility: MEDICAL CENTER | Age: 60
End: 2020-11-02
Payer: COMMERCIAL

## 2020-11-02 VITALS
WEIGHT: 200 LBS | SYSTOLIC BLOOD PRESSURE: 110 MMHG | HEIGHT: 72 IN | HEART RATE: 72 BPM | DIASTOLIC BLOOD PRESSURE: 62 MMHG | OXYGEN SATURATION: 94 % | BODY MASS INDEX: 27.09 KG/M2

## 2020-11-02 DIAGNOSIS — M79.10 MYALGIA: ICD-10-CM

## 2020-11-02 DIAGNOSIS — I25.10 CORONARY ARTERY DISEASE DUE TO LIPID RICH PLAQUE: Chronic | ICD-10-CM

## 2020-11-02 DIAGNOSIS — Z98.890 S/P RADIOFREQUENCY ABLATION OPERATION FOR ARRHYTHMIA: ICD-10-CM

## 2020-11-02 DIAGNOSIS — I48.0 PAF (PAROXYSMAL ATRIAL FIBRILLATION) (HCC): Chronic | ICD-10-CM

## 2020-11-02 DIAGNOSIS — E78.5 DYSLIPIDEMIA: Chronic | ICD-10-CM

## 2020-11-02 DIAGNOSIS — Z86.79 S/P RADIOFREQUENCY ABLATION OPERATION FOR ARRHYTHMIA: ICD-10-CM

## 2020-11-02 DIAGNOSIS — I47.10 SVT (SUPRAVENTRICULAR TACHYCARDIA) (HCC): ICD-10-CM

## 2020-11-02 DIAGNOSIS — I25.83 CORONARY ARTERY DISEASE DUE TO LIPID RICH PLAQUE: Chronic | ICD-10-CM

## 2020-11-02 DIAGNOSIS — Z95.5 PRESENCE OF DRUG COATED STENT IN RIGHT CORONARY ARTERY: Chronic | ICD-10-CM

## 2020-11-02 PROCEDURE — 99214 OFFICE O/P EST MOD 30 MIN: CPT | Performed by: INTERNAL MEDICINE

## 2020-11-02 RX ORDER — PROMETHAZINE HYDROCHLORIDE 6.25 MG/5ML
SYRUP ORAL
COMMUNITY
End: 2021-11-09 | Stop reason: SDUPTHER

## 2020-11-02 ASSESSMENT — ENCOUNTER SYMPTOMS
BRUISES/BLEEDS EASILY: 0
FALLS: 0
FEVER: 0
DIZZINESS: 0
FOCAL WEAKNESS: 0
MYALGIAS: 1
BLURRED VISION: 0
NAUSEA: 0
SORE THROAT: 0
COUGH: 0
PALPITATIONS: 1
WEAKNESS: 0
ABDOMINAL PAIN: 0
CHILLS: 0
PND: 0
CLAUDICATION: 0
SHORTNESS OF BREATH: 1
NERVOUS/ANXIOUS: 1

## 2020-11-02 ASSESSMENT — FIBROSIS 4 INDEX: FIB4 SCORE: 1.03

## 2020-11-02 NOTE — PROGRESS NOTES
"Chief Complaint   Patient presents with   • Follow-Up     6 Month Follow Up, Paroxysmal Atrial Fibrillation       Subjective:   Lc Briggs is a 60 y.o. female who presents today for follow-up of her history of coronary disease with history of ablation remotely  She has been doing okay she has not needed nitroglycerin as much over the last 6 months although she did use it recently she is describing more palpitations with activity she tries to carry her laundry up the stairs and gets more palpitations she also walks around her neighborhood on regular occasion and found to have more palpitations low at rest she reports resting heart rates in the 40s to 50s    Past Medical History:   Diagnosis Date   • Arthritis current    knees, hands, elbows   • ASTHMA 2008    recurring   • Atrial fibrillation ([facility])    • Breath shortness 02/25/2019    uses oxygen at 3 liters/min cont. \"[employer]\" oxygen company   • Bronchitis    • Cancer ([facility]) 1980s    Hodgkins lymphoma   • COPD with acute exacerbation ([facility]) 2/25/2015   • Coronary artery disease due to lipid rich plaque - moderate RCA FFR negative    • Diabetes ([facility])    • Dyslipidemia    • High cholesterol 02/25/2019   • Hypertension    • Indigestion current    tx with OTC rolaids   • MI (myocardial infarction) ([facility])    • Oxygen dependent    • PAF (paroxysmal atrial fibrillation) ([facility]) - s/p ablation 2015    • Persistent atrial fibrillation ([facility]) 2/9/2016   • Pneumonia    • Pulmonary emphysema ([facility])    • Pulmonary nodule    • Restless leg syndrome    • Sepsis ([facility]) 2/25/2015   • Sleep apnea      Past Surgical History:   Procedure Laterality Date   • CARDIAC CATH, RIGHT/LEFT HEART     • OTHER CARDIAC SURGERY     • WEDGE RESECTION LUNG       Family History   Problem Relation Age of Onset   • Heart Disease Mother    • Other Mother 72        pacemaker   • Lung Disease Father         severe asthma   • Alcohol/Drug Maternal Grandfather    • Lung Disease Paternal Grandmother  " "    Social History     Socioeconomic History   • Marital status:      Spouse name: Not on file   • Number of children: Not on file   • Years of education: Not on file   • Highest education level: Not on file   Occupational History   • Not on file   Social Needs   • Financial resource strain: Not on file   • Food insecurity     Worry: Not on file     Inability: Not on file   • Transportation needs     Medical: Not on file     Non-medical: Not on file   Tobacco Use   • Smoking status: Former Smoker     Packs/day: 1.00     Years: 35.00     Pack years: 35.00     Types: Cigarettes     Quit date: 3/21/2013     Years since quittin.6   • Smokeless tobacco: Never Used   • Tobacco comment: quit 4 days ago, smoked less than 1ppd   Substance and Sexual Activity   • Alcohol use: No   • Drug use: No     Comment: former cocaine 'lots of it', former meth clean 9 years   • Sexual activity: Not on file   Lifestyle   • Physical activity     Days per week: Not on file     Minutes per session: Not on file   • Stress: Not on file   Relationships   • Social connections     Talks on phone: Not on file     Gets together: Not on file     Attends [employer] service: Not on file     Active member of club or organization: Not on file     Attends meetings of clubs or organizations: Not on file     Relationship status: Not on file   • Intimate partner violence     Fear of current or ex partner: Not on file     Emotionally abused: Not on file     Physically abused: Not on file     Forced sexual activity: Not on file   Other Topics Concern   • Not on file   Social History Narrative   • Not on file     Allergies   Allergen Reactions   • Cephalexin Anaphylaxis and Shortness of Breath     Rxn date: .   • Clindamycin Anaphylaxis     Rxn date: .   • Codeine Rash   • Keflex Anaphylaxis   • Lisinopril Palpitations   • Penicillins Hives, Rash and Itching     Rxn date: \"As a child.\"   • Codeine Hives, Rash and Itching     Rxn date: \"As a " "child.\"   • Rosuvastatin Myalgia     Outpatient Encounter Medications as of 11/2/2020   Medication Sig Dispense Refill   • promethazine (PHENERGAN) 6.25 MG/5ML Syrup promethazine 6.25 mg/5 mL oral syrup     • Fluticasone-Umeclidin-Vilant (TRELEGY ELLIPTA) 100-62.5-25 MCG/INH AEROSOL POWDER, BREATH ACTIVATED Inhale 1 Puff by mouth every day. 1 Each 0   • nitroglycerin (NITROSTAT) 0.4 MG SL Tab Place 1 Tab under tongue as needed for Chest Pain. 25 Tab 11   • metFORMIN (GLUCOPHAGE) 500 MG Tab Take 1 Tab by mouth 2 times a day. 60 Tab 0   • simvastatin (ZOCOR) 20 MG Tab simvastatin 20 mg tablet     • OXYGEN-HELIUM INH oxygen     • atenolol (TENORMIN) 25 MG Tab Take 25 mg by mouth every day.     • aspirin (ASA) 81 MG Chew Tab chewable tablet Take 81 mg by mouth every day.     • [DISCONTINUED] metformin (GLUCOPHAGE) 1000 MG tablet      • ipratropium-albuterol (DUONEB) 0.5-2.5 (3) MG/3ML nebulizer solution 3 mL by Nebulization route every four hours as needed for Shortness of Breath. (Patient not taking: Reported on 9/25/2020) 120 mL 11   • fluticasone (FLOVENT HFA) 110 MCG/ACT Aerosol Inhale 2 Puffs by mouth 2 times a day. Use with spacer.  Rinse mouth after each use. (Patient not taking: Reported on 12/3/2019) 1 Inhaler 11   • guaiFENesin ER (MUCINEX) 600 MG TABLET SR 12 HR Take 1 Tab by mouth every 12 hours. (Patient not taking: Reported on 9/25/2020) 28 Tab 3   • albuterol (PROAIR HFA) 108 (90 Base) MCG/ACT Aero Soln inhalation aerosol Inhale 2 Puffs by mouth every four hours as needed for Shortness of Breath (wheezing). (Patient not taking: Reported on 3/2/2020) 1 Inhaler 1   • [DISCONTINUED] albuterol (ACCUNEB) 0.63 MG/3ML nebulizer solution albuterol sulfate       No facility-administered encounter medications on file as of 11/2/2020.      Review of Systems   Constitutional: Positive for malaise/fatigue. Negative for chills and fever.   HENT: Negative for sore throat.    Eyes: Negative for blurred vision. "   Respiratory: Positive for shortness of breath. Negative for cough.    Cardiovascular: Positive for palpitations. Negative for chest pain, claudication, leg swelling and PND.   Gastrointestinal: Negative for abdominal pain and nausea.   Musculoskeletal: Positive for myalgias. Negative for falls and joint pain.   Skin: Negative for rash.   Neurological: Negative for dizziness, focal weakness and weakness.   Endo/Heme/Allergies: Does not bruise/bleed easily.   Psychiatric/Behavioral: The patient is nervous/anxious.         Objective:   /62 (BP Location: Left arm, Patient Position: Sitting, BP Cuff Size: Adult)   Pulse 72   Ht 1.829 m (6')   Wt 90.7 kg (200 lb)   LMP 01/01/2006   SpO2 94%   BMI 27.12 kg/m²     Physical Exam   Constitutional: No distress.   HENT:   Patient wearing a mask due to COVID precautions   Eyes: No scleral icterus.   Neck: No JVD present.   Cardiovascular: Normal rate and normal heart sounds. Exam reveals no gallop and no friction rub.   No murmur heard.  Pulmonary/Chest: No respiratory distress. She has no wheezes. She has no rales.   Abdominal: Soft. Bowel sounds are normal.   Musculoskeletal:         General: No edema.   Neurological: She is alert.   Skin: No rash noted. She is not diaphoretic.   Psychiatric: She has a normal mood and affect.       Assessment:     1. PAF (paroxysmal atrial fibrillation) ([facility]) - s/p ablation 2015  CBC WITHOUT DIFFERENTIAL   2. Coronary artery disease due to lipid rich plaque - moderate RCA FFR negative 9/2015  Comp Metabolic Panel    Lipid Profile    CMP14+EGFR    CBC WITH DIFFERENTIAL   3. Dyslipidemia  LIPID PANEL   4. Presence of drug coated stent in right coronary artery 2019     5. S/P radiofrequency ablation operation for arrhythmia     6. SVT (supraventricular tachycardia) ([facility])     7. Myalgia  Sed Rate    Sed Rate       Medical Decision Making:  Today's Assessment / Status / Plan:     It was my pleasure to meet with Ms. Burns  [place].    She was worried about blood clots due to muscle cramps muscle aches we wanted her to get a sed rate so hopefully she is able to do labs for this difficulty in the setting of Covid    She is on appropriate statin.    She will monitor for palpitations certainly could try diltiazem or digoxin right now she is comfortable with atenolol which she is done well for long-term    I will see Ms. Grant Briggs back in 6 months time and encouraged her to follow up with us over the phone or electronically using my MyChart as issues arise.    It is my pleasure to participate in the care of Ms. Grant Briggs.  Please do not hesitate to contact me with questions or concerns.    Walker Adames MD PhD [facility]  Cardiologist [facility]    Please note that this dictation was created using voice recognition software. There may be errors I did not discover before finalizing the note.

## 2020-12-30 DIAGNOSIS — J44.9 CHRONIC OBSTRUCTIVE PULMONARY DISEASE, UNSPECIFIED COPD TYPE (HCC): ICD-10-CM

## 2020-12-30 RX ORDER — IPRATROPIUM BROMIDE AND ALBUTEROL SULFATE 2.5; .5 MG/3ML; MG/3ML
SOLUTION RESPIRATORY (INHALATION)
Qty: 360 ML | Refills: 4 | Status: SHIPPED | OUTPATIENT
Start: 2020-12-30 | End: 2022-03-22 | Stop reason: SDUPTHER

## 2020-12-30 NOTE — TELEPHONE ENCOUNTER
Have we ever prescribed this med? Yes.  If yes, what date? 10/29/2019    Last OV: 09/25/2020 - DR. DENG    Next OV: due back 3/2021    DX: COPD    Medications: Duoneb

## 2021-02-17 ENCOUNTER — HOSPITAL ENCOUNTER (OUTPATIENT)
Dept: LAB | Facility: MEDICAL CENTER | Age: 61
End: 2021-02-17
Attending: FAMILY MEDICINE
Payer: COMMERCIAL

## 2021-02-17 LAB
ALBUMIN SERPL BCP-MCNC: 4.3 G/DL (ref 3.2–4.9)
ALBUMIN/GLOB SERPL: 1.3 G/DL
ALP SERPL-CCNC: 76 U/L (ref 30–99)
ALT SERPL-CCNC: 19 U/L (ref 2–50)
ANION GAP SERPL CALC-SCNC: 8 MMOL/L (ref 7–16)
APPEARANCE UR: CLEAR
AST SERPL-CCNC: 15 U/L (ref 12–45)
BACTERIA #/AREA URNS HPF: NEGATIVE /HPF
BASOPHILS # BLD AUTO: 1.3 % (ref 0–1.8)
BASOPHILS # BLD: 0.08 K/UL (ref 0–0.12)
BILIRUB SERPL-MCNC: 0.5 MG/DL (ref 0.1–1.5)
BILIRUB UR QL STRIP.AUTO: NEGATIVE
BUN SERPL-MCNC: 15 MG/DL (ref 8–22)
CALCIUM SERPL-MCNC: 9.6 MG/DL (ref 8.5–10.5)
CHLORIDE SERPL-SCNC: 99 MMOL/L (ref 96–112)
CHOLEST SERPL-MCNC: 190 MG/DL (ref 100–199)
CO2 SERPL-SCNC: 30 MMOL/L (ref 20–33)
COLOR UR: YELLOW
CREAT SERPL-MCNC: 0.59 MG/DL (ref 0.5–1.4)
CREAT UR-MCNC: 79.03 MG/DL
EOSINOPHIL # BLD AUTO: 0.15 K/UL (ref 0–0.51)
EOSINOPHIL NFR BLD: 2.3 % (ref 0–6.9)
EPI CELLS #/AREA URNS HPF: NORMAL /HPF
ERYTHROCYTE [DISTWIDTH] IN BLOOD BY AUTOMATED COUNT: 43.8 FL (ref 35.9–50)
EST. AVERAGE GLUCOSE BLD GHB EST-MCNC: 163 MG/DL
FASTING STATUS PATIENT QL REPORTED: NORMAL
GLOBULIN SER CALC-MCNC: 3.4 G/DL (ref 1.9–3.5)
GLUCOSE SERPL-MCNC: 174 MG/DL (ref 65–99)
GLUCOSE UR STRIP.AUTO-MCNC: NEGATIVE MG/DL
HBA1C MFR BLD: 7.3 % (ref 0–5.6)
HCT VFR BLD AUTO: 43.5 % (ref 37–47)
HDLC SERPL-MCNC: 50 MG/DL
HGB BLD-MCNC: 13.9 G/DL (ref 12–16)
HYALINE CASTS #/AREA URNS LPF: NORMAL /LPF
IMM GRANULOCYTES # BLD AUTO: 0.02 K/UL (ref 0–0.11)
IMM GRANULOCYTES NFR BLD AUTO: 0.3 % (ref 0–0.9)
KETONES UR STRIP.AUTO-MCNC: NEGATIVE MG/DL
LDLC SERPL CALC-MCNC: 115 MG/DL
LEUKOCYTE ESTERASE UR QL STRIP.AUTO: ABNORMAL
LYMPHOCYTES # BLD AUTO: 1.86 K/UL (ref 1–4.8)
LYMPHOCYTES NFR BLD: 29.1 % (ref 22–41)
MCH RBC QN AUTO: 30.2 PG (ref 27–33)
MCHC RBC AUTO-ENTMCNC: 32 G/DL (ref 33.6–35)
MCV RBC AUTO: 94.6 FL (ref 81.4–97.8)
MICRO URNS: ABNORMAL
MICROALBUMIN UR-MCNC: 1.2 MG/DL
MICROALBUMIN/CREAT UR: 15 MG/G (ref 0–30)
MONOCYTES # BLD AUTO: 0.33 K/UL (ref 0–0.85)
MONOCYTES NFR BLD AUTO: 5.2 % (ref 0–13.4)
NEUTROPHILS # BLD AUTO: 3.96 K/UL (ref 2–7.15)
NEUTROPHILS NFR BLD: 61.8 % (ref 44–72)
NITRITE UR QL STRIP.AUTO: NEGATIVE
NRBC # BLD AUTO: 0 K/UL
NRBC BLD-RTO: 0 /100 WBC
PH UR STRIP.AUTO: 6 [PH] (ref 5–8)
PLATELET # BLD AUTO: 192 K/UL (ref 164–446)
PMV BLD AUTO: 10.4 FL (ref 9–12.9)
POTASSIUM SERPL-SCNC: 4.5 MMOL/L (ref 3.6–5.5)
PROT SERPL-MCNC: 7.7 G/DL (ref 6–8.2)
PROT UR QL STRIP: NEGATIVE MG/DL
RBC # BLD AUTO: 4.6 M/UL (ref 4.2–5.4)
RBC # URNS HPF: NORMAL /HPF
RBC UR QL AUTO: NEGATIVE
SODIUM SERPL-SCNC: 137 MMOL/L (ref 135–145)
SP GR UR STRIP.AUTO: >=1.03
TRIGL SERPL-MCNC: 126 MG/DL (ref 0–149)
TSH SERPL DL<=0.005 MIU/L-ACNC: 1.07 UIU/ML (ref 0.38–5.33)
UROBILINOGEN UR STRIP.AUTO-MCNC: 0.2 MG/DL
WBC # BLD AUTO: 6.4 K/UL (ref 4.8–10.8)
WBC #/AREA URNS HPF: NORMAL /HPF

## 2021-02-17 PROCEDURE — 80061 LIPID PANEL: CPT

## 2021-02-17 PROCEDURE — 82570 ASSAY OF URINE CREATININE: CPT

## 2021-02-17 PROCEDURE — 85025 COMPLETE CBC W/AUTO DIFF WBC: CPT

## 2021-02-17 PROCEDURE — 36415 COLL VENOUS BLD VENIPUNCTURE: CPT

## 2021-02-17 PROCEDURE — 81001 URINALYSIS AUTO W/SCOPE: CPT

## 2021-02-17 PROCEDURE — 84443 ASSAY THYROID STIM HORMONE: CPT

## 2021-02-17 PROCEDURE — 80053 COMPREHEN METABOLIC PANEL: CPT

## 2021-02-17 PROCEDURE — 83036 HEMOGLOBIN GLYCOSYLATED A1C: CPT

## 2021-02-17 PROCEDURE — 82043 UR ALBUMIN QUANTITATIVE: CPT

## 2021-03-15 DIAGNOSIS — Z23 NEED FOR VACCINATION: ICD-10-CM

## 2021-04-12 ENCOUNTER — TELEPHONE (OUTPATIENT)
Dept: CARDIOLOGY | Facility: MEDICAL CENTER | Age: 61
End: 2021-04-12

## 2021-04-12 NOTE — LETTER
April 12, 2021        Lc Briggs  [address] [place] [id number]          Dear Lc,    I apologize in the delay of notifying you of your results.    We have received the results of your recent:Comprehensive Metabolic Panel, Hemoglobin A1C, Complete Blood Count.    Your test came back within normal limits.  Please follow up as previously discussed with your physician.      Feel free to call us with any questions.        Sincerely,          Marisela Adames  Electronically Signed

## 2021-04-12 NOTE — TELEPHONE ENCOUNTER
----- Message from Walker Adames M.D. sent at 2/17/2021  3:08 PM PST -----  Labs look good, please let her know     Thank you

## 2021-05-28 ENCOUNTER — OFFICE VISIT (OUTPATIENT)
Dept: SLEEP MEDICINE | Facility: MEDICAL CENTER | Age: 61
End: 2021-05-28
Payer: COMMERCIAL

## 2021-05-28 VITALS
TEMPERATURE: 97.7 F | BODY MASS INDEX: 27.22 KG/M2 | WEIGHT: 201 LBS | HEART RATE: 76 BPM | DIASTOLIC BLOOD PRESSURE: 68 MMHG | RESPIRATION RATE: 16 BRPM | SYSTOLIC BLOOD PRESSURE: 112 MMHG | HEIGHT: 72 IN | OXYGEN SATURATION: 96 %

## 2021-05-28 DIAGNOSIS — Z87.891 FORMER SMOKER: ICD-10-CM

## 2021-05-28 DIAGNOSIS — J96.11 CHRONIC RESPIRATORY FAILURE WITH HYPOXIA (HCC): ICD-10-CM

## 2021-05-28 DIAGNOSIS — Z86.79 S/P RADIOFREQUENCY ABLATION OPERATION FOR ARRHYTHMIA: ICD-10-CM

## 2021-05-28 DIAGNOSIS — Z98.890 S/P RADIOFREQUENCY ABLATION OPERATION FOR ARRHYTHMIA: ICD-10-CM

## 2021-05-28 DIAGNOSIS — J44.9 CHRONIC OBSTRUCTIVE PULMONARY DISEASE, UNSPECIFIED COPD TYPE (HCC): ICD-10-CM

## 2021-05-28 PROCEDURE — 99214 OFFICE O/P EST MOD 30 MIN: CPT | Performed by: INTERNAL MEDICINE

## 2021-05-28 RX ORDER — TIOTROPIUM BROMIDE AND OLODATEROL 3.124; 2.736 UG/1; UG/1
2 SPRAY, METERED RESPIRATORY (INHALATION) DAILY
Qty: 1 EACH | Refills: 0 | COMMUNITY
Start: 2021-05-28 | End: 2021-09-21

## 2021-05-28 RX ORDER — TIOTROPIUM BROMIDE AND OLODATEROL 3.124; 2.736 UG/1; UG/1
2 SPRAY, METERED RESPIRATORY (INHALATION) DAILY
Qty: 1 EACH | Refills: 0 | COMMUNITY
Start: 2021-05-28 | End: 2021-06-15 | Stop reason: SDUPTHER

## 2021-05-28 RX ORDER — TIOTROPIUM BROMIDE AND OLODATEROL 3.124; 2.736 UG/1; UG/1
2 SPRAY, METERED RESPIRATORY (INHALATION) DAILY
Qty: 3 EACH | Refills: 4 | Status: SHIPPED | OUTPATIENT
Start: 2021-05-28 | End: 2021-12-16 | Stop reason: SDUPTHER

## 2021-05-28 ASSESSMENT — ENCOUNTER SYMPTOMS
DIAPHORESIS: 0
COUGH: 0
DIARRHEA: 0
SINUS PAIN: 0
STRIDOR: 0
HEMOPTYSIS: 0
DOUBLE VISION: 0
FOCAL WEAKNESS: 0
SORE THROAT: 0
NECK PAIN: 0
EYE PAIN: 0
WHEEZING: 0
BLURRED VISION: 0
SPEECH CHANGE: 0
PND: 0
SHORTNESS OF BREATH: 1
DEPRESSION: 0
NAUSEA: 0
BACK PAIN: 0
PALPITATIONS: 0
TREMORS: 0
MYALGIAS: 0
CLAUDICATION: 0
FALLS: 0
ORTHOPNEA: 0
VOMITING: 0
DIZZINESS: 0
FEVER: 0
HEADACHES: 0
EYE DISCHARGE: 0
PHOTOPHOBIA: 0
SPUTUM PRODUCTION: 0
ABDOMINAL PAIN: 0
EYE REDNESS: 0
CONSTIPATION: 0
HEARTBURN: 0
WEAKNESS: 0
WEIGHT LOSS: 0
CHILLS: 0

## 2021-05-28 ASSESSMENT — FIBROSIS 4 INDEX: FIB4 SCORE: 1.11

## 2021-05-28 NOTE — PROGRESS NOTES
Chief Complaint   Patient presents with   • COPD     last seen 2020          HPI: This patient is a 60 y.o. female whom is followed in our clinic for COPD complicated by chronic hypoxic respiratory failure last seen by me on 2020.Her past medical history includes mild obstructive sleep apnea, no longer on CPAP and OPO on 2LPM only with adequate oxygenation at night based on overnight oximetry, atrial arrhythmia status post ablation in , CAD.   Patient is a former tobacco user with greater than 35-pack-year history and quit in .    Patient has not been able to tolerate inhaled corticosteroids but did do well on Stiolto.  Unfortunately was not covered by her insurance company therefore she has been using only short acting bronchodilators via MDI or nebulizer. Pulmonary function testing last visit in December showed FEV1 prebronchodilator of 1.12 L or 36% of predicted and FEV1/FVC ratio 44.  She has a positive bronchodilator response.  Total lung capacity is within normal limits at 99% predicted however there is significant air trapping with RV/TLC of 151.  DLCO was mildly reduced at 69%.  She has had lung cancer screening last in 2019 that showed right lower lobe atelectasis versus pneumonitis as well as some atelectatic changes in the right middle lobe, right upper lobe and lingula.  Patient declined repeat imaging due to cost.  She has also declined pulmonary rehab due to cost.  She presents today for routine follow-up.  She has been treated twice with oral prednisone for exacerbations as an outpatient since her last clinic visit.  She reports increased use of her nebulizer and rescue inhaler in addition to increased breathlessness over the past several weeks.  No fevers or chills.  No purulent sputum.  She recently had a brother who  at the age of 57 from end-stage COPD.  There is a fairly strong family history of early COPD.    Past Medical History:   Diagnosis Date   • Arthritis  "current    knees, hands, elbows   • ASTHMA 2008    recurring   • Atrial fibrillation ([facility])    • Breath shortness 2019    uses oxygen at 3 liters/min cont. \"[employer]erred\" oxygen company   • Bronchitis    • Cancer ([facility]) 1980s    Hodgkins lymphoma   • COPD with acute exacerbation ([facility]) 2015   • Coronary artery disease due to lipid rich plaque - moderate RCA FFR negative    • Diabetes ([facility])    • Dyslipidemia    • High cholesterol 2019   • Hypertension    • Indigestion current    tx with OTC rolaids   • MI (myocardial infarction) ([facility])    • Oxygen dependent    • PAF (paroxysmal atrial fibrillation) ([facility]) - s/p ablation     • Persistent atrial fibrillation ([facility]) 2016   • Pneumonia    • Pulmonary emphysema ([facility])    • Pulmonary nodule    • Restless leg syndrome    • Sepsis ([facility]) 2015   • Sleep apnea        Social History     Socioeconomic History   • Marital status:      Spouse name: Not on file   • Number of children: Not on file   • Years of education: Not on file   • Highest education level: Not on file   Occupational History   • Not on file   Tobacco Use   • Smoking status: Former Smoker     Packs/day: 1.00     Years: 35.00     Pack years: 35.00     Types: Cigarettes     Quit date: 3/21/2013     Years since quittin.1   • Smokeless tobacco: Never Used   • Tobacco comment: quit 4 days ago, smoked less than 1ppd   Vaping Use   • Vaping Use: Never used   Substance and Sexual Activity   • Alcohol use: No   • Drug use: No     Comment: former cocaine 'lots of it', former meth clean 9 years   • Sexual activity: Not on file   Other Topics Concern   • Not on file   Social History Narrative   • Not on file     Social Determinants of Health     Financial Resource Strain:    • Difficulty of Paying Living Expenses:    Food Insecurity:    • Worried About Running Out of Food in the Last Year:    • Ran Out of Food in the Last Year:    Transportation Needs:    • Lack of Transportation (Medical):    • " Lack of Transportation (Non-Medical):    Physical Activity:    • Days of Exercise per Week:    • Minutes of Exercise per Session:    Stress:    • Feeling of Stress :    Social Connections:    • Frequency of Communication with Friends and Family:    • Frequency of Social Gatherings with Friends and Family:    • Attends Anabaptism Services:    • Active Member of Clubs or Organizations:    • Attends Club or Organization Meetings:    • Marital Status:    Intimate Partner Violence:    • Fear of Current or Ex-Partner:    • Emotionally Abused:    • Physically Abused:    • Sexually Abused:        Family History   Problem Relation Age of Onset   • Heart Disease Mother    • Other Mother 72        pacemaker   • Lung Disease Father         severe asthma   • Alcohol/Drug Maternal Grandfather    • Lung Disease Paternal Grandmother        Current Outpatient Medications on File Prior to Visit   Medication Sig Dispense Refill   • ipratropium-albuterol (DUONEB) 0.5-2.5 (3) MG/3ML nebulizer solution use 1 vial via nebulizer every 4 hours as needed for shortness of breath 360 mL 4   • promethazine (PHENERGAN) 6.25 MG/5ML Syrup promethazine 6.25 mg/5 mL oral syrup     • nitroglycerin (NITROSTAT) 0.4 MG SL Tab Place 1 Tab under tongue as needed for Chest Pain. 25 Tab 11   • albuterol (PROAIR HFA) 108 (90 Base) MCG/ACT Aero Soln inhalation aerosol Inhale 2 Puffs by mouth every four hours as needed for Shortness of Breath (wheezing). 1 Inhaler 1   • metFORMIN (GLUCOPHAGE) 500 MG Tab Take 1 Tab by mouth 2 times a day. 60 Tab 0   • simvastatin (ZOCOR) 20 MG Tab simvastatin 20 mg tablet     • atenolol (TENORMIN) 25 MG Tab Take 25 mg by mouth every day.     • aspirin (ASA) 81 MG Chew Tab chewable tablet Take 81 mg by mouth every day.     • OXYGEN-HELIUM INH oxygen       No current facility-administered medications on file prior to visit.       Cephalexin, Clindamycin, Codeine, Keflex, Lisinopril, Penicillins, Codeine, and Rosuvastatin      ROS:    Review of Systems   Constitutional: Negative for chills, diaphoresis, fever, malaise/fatigue and weight loss.   HENT: Negative for congestion, ear discharge, ear pain, hearing loss, nosebleeds, sinus pain, sore throat and tinnitus.    Eyes: Negative for blurred vision, double vision, photophobia, pain, discharge and redness.   Respiratory: Positive for shortness of breath. Negative for cough, hemoptysis, sputum production, wheezing and stridor.    Cardiovascular: Negative for chest pain, palpitations, orthopnea, claudication, leg swelling and PND.   Gastrointestinal: Negative for abdominal pain, constipation, diarrhea, heartburn, nausea and vomiting.   Genitourinary: Negative for dysuria and urgency.   Musculoskeletal: Negative for back pain, falls, joint pain, myalgias and neck pain.   Skin: Negative for itching and rash.   Neurological: Negative for dizziness, tremors, speech change, focal weakness, weakness and headaches.   Endo/Heme/Allergies: Negative for environmental allergies.   Psychiatric/Behavioral: Negative for depression.       /68 (BP Location: Right arm, Patient Position: Sitting, BP Cuff Size: Large adult)   Pulse 76   Temp 36.5 °C (97.7 °F) (Temporal)   Resp 16   Ht 1.829 m (6')   Wt 91.2 kg (201 lb)   SpO2 96%   Physical Exam  Vitals reviewed.   Constitutional:       General: She is not in acute distress.     Appearance: Normal appearance. She is well-developed and normal weight.   HENT:      Head: Normocephalic and atraumatic.      Right Ear: External ear normal.      Left Ear: External ear normal.      Nose: Nose normal. No congestion.      Mouth/Throat:      Mouth: Mucous membranes are moist.      Pharynx: Oropharynx is clear. No oropharyngeal exudate.   Eyes:      General: No scleral icterus.     Extraocular Movements: Extraocular movements intact.      Conjunctiva/sclera: Conjunctivae normal.      Pupils: Pupils are equal, round, and reactive to light.   Neck:      Vascular: No  JVD.      Trachea: No tracheal deviation.   Cardiovascular:      Rate and Rhythm: Normal rate and regular rhythm.      Heart sounds: Normal heart sounds. No murmur heard.   No friction rub. No gallop.    Pulmonary:      Effort: Pulmonary effort is normal. No accessory muscle usage or respiratory distress.      Breath sounds: No wheezing or rales.      Comments: Decreased air movement throughout  Abdominal:      General: There is no distension.      Palpations: Abdomen is soft.      Tenderness: There is no abdominal tenderness.   Musculoskeletal:         General: No tenderness or deformity. Normal range of motion.      Cervical back: Normal range of motion and neck supple.      Right lower leg: No edema.      Left lower leg: No edema.   Lymphadenopathy:      Cervical: No cervical adenopathy.   Skin:     General: Skin is warm and dry.      Findings: No rash.      Nails: There is no clubbing.   Neurological:      Mental Status: She is alert and oriented to person, place, and time.      Cranial Nerves: No cranial nerve deficit.      Gait: Gait normal.   Psychiatric:         Mood and Affect: Mood normal.         Behavior: Behavior normal.         PFTs as reviewed by me personally: As per HPI    Imaging as reviewed by me personally: As per HPI    Assessment:  1. Chronic obstructive pulmonary disease, unspecified COPD type (HCC)  ALPHA-1-ANTITRYPSIN    Tiotropium Bromide-Olodaterol (STIOLTO RESPIMAT) 2.5-2.5 MCG/ACT Aero Soln    PULMONARY FUNCTION TESTS -Test requested: Complete Pulmonary Function Test    Tiotropium Bromide-Olodaterol (STIOLTO RESPIMAT) 2.5-2.5 MCG/ACT Aero Soln    Tiotropium Bromide-Olodaterol (STIOLTO RESPIMAT) 2.5-2.5 MCG/ACT Aero Soln   2. Chronic respiratory failure with hypoxia (HCC)  ALPHA-1-ANTITRYPSIN    Tiotropium Bromide-Olodaterol (STIOLTO RESPIMAT) 2.5-2.5 MCG/ACT Aero Soln    PULMONARY FUNCTION TESTS -Test requested: Complete Pulmonary Function Test   3. S/P radiofrequency ablation operation  for arrhythmia     4. Former smoker         Plan:  1.  This is chronic, severe and not currently well controlled due to lack of controller inhaler.  I also think she would benefit from pulmonary rehab.  We will try to get Stiolto authorized and apply for prior authorization.  I did give her a sample today.  Again discussed possible pulmonary rehab which she declined due to co-pay.  In the meantime she is due for follow-up PFTs and I have ordered alpha-1 antitrypsin given strong family history.  Patient is otherwise tobacco free and up-to-date on vaccines.  2.  Chronic and secondary to COPD.  She has been using higher doses of oxygen due to breathlessness and she is due for updated PFTs.  She declines pulmonary rehab.  She is 96% on 5 L pulsed today.  Previously she was on 2 L.  I have ordered updated PFTs and recommended we try to get her back on controller therapy regularly.  3.  Normal rate and rhythm today.  Followed by cardiology.  4.  Patient is tobacco free.  Encouraged ongoing abstinence.  She declined further low-dose CT chest.  Return in about 3 months (around 8/28/2021) for PFTs, labs.

## 2021-06-02 ENCOUNTER — TELEPHONE (OUTPATIENT)
Dept: SLEEP MEDICINE | Facility: MEDICAL CENTER | Age: 61
End: 2021-06-02

## 2021-06-02 NOTE — TELEPHONE ENCOUNTER
MEDICATION PRIOR AUTHORIZATION NEEDED:    1. Name of Medication: Stiolto Respimat 2.5-2.5 mcg    2. Requested By (Name of Pharmacy): [facility]     3. Is insurance on file current? yes    4. What is the name & phone number of the 3rd party payor? Jim Encarnacion

## 2021-06-14 NOTE — TELEPHONE ENCOUNTER
DOCUMENTATION OF PRIOR AUTH STATUS    1. Medication name and dose: Stiolto 2.5-2.5 mcg    2. Name and Phone # of Prescription coverage company: [employer]    3. Date Prior Auth was submitted: 6/7/2021    4. What information was given to obtain insurance decision: Clinical notes    5. Prior Auth letter Approved or Denied: Approved through 6/14/2022    6. Pharmacy notified: Yes    7. Patient notified: No

## 2021-06-15 ENCOUNTER — TELEPHONE (OUTPATIENT)
Dept: SLEEP MEDICINE | Facility: MEDICAL CENTER | Age: 61
End: 2021-06-15

## 2021-06-15 DIAGNOSIS — J44.9 CHRONIC OBSTRUCTIVE PULMONARY DISEASE, UNSPECIFIED COPD TYPE (HCC): ICD-10-CM

## 2021-06-15 RX ORDER — TIOTROPIUM BROMIDE AND OLODATEROL 3.124; 2.736 UG/1; UG/1
2 SPRAY, METERED RESPIRATORY (INHALATION) DAILY
Qty: 1 EACH | Refills: 0 | COMMUNITY
Start: 2021-06-15 | End: 2021-09-21

## 2021-06-15 NOTE — TELEPHONE ENCOUNTER
Patient calling to see if we got any FREE Alpha 1 testing kits in. We do not and may get them again next week. I asked the patient to call us back regarding this.     Patient asking for Stiolto sample today.    Have we ever prescribed this med? Yes.  If yes, what date? 5/28/21    Last OV: 5/28/21    Next OV: 9/21/21    DX: COPD    Medications: Stiolto

## 2021-08-03 ENCOUNTER — TELEPHONE (OUTPATIENT)
Dept: SLEEP MEDICINE | Facility: MEDICAL CENTER | Age: 61
End: 2021-08-03

## 2021-08-05 DIAGNOSIS — I20.0 UNSTABLE ANGINA (HCC): ICD-10-CM

## 2021-08-10 RX ORDER — NITROGLYCERIN 0.4 MG/1
TABLET SUBLINGUAL
Qty: 25 TABLET | Refills: 0 | Status: SHIPPED | OUTPATIENT
Start: 2021-08-10 | End: 2022-01-18

## 2021-08-31 ENCOUNTER — NON-PROVIDER VISIT (OUTPATIENT)
Dept: SLEEP MEDICINE | Facility: MEDICAL CENTER | Age: 61
End: 2021-08-31
Attending: INTERNAL MEDICINE
Payer: COMMERCIAL

## 2021-08-31 VITALS — HEIGHT: 70 IN | BODY MASS INDEX: 28.63 KG/M2 | WEIGHT: 200 LBS

## 2021-08-31 DIAGNOSIS — J44.9 CHRONIC OBSTRUCTIVE PULMONARY DISEASE, UNSPECIFIED COPD TYPE (HCC): ICD-10-CM

## 2021-08-31 DIAGNOSIS — J96.11 CHRONIC RESPIRATORY FAILURE WITH HYPOXIA (HCC): ICD-10-CM

## 2021-08-31 PROCEDURE — 94726 PLETHYSMOGRAPHY LUNG VOLUMES: CPT | Performed by: INTERNAL MEDICINE

## 2021-08-31 PROCEDURE — 94060 EVALUATION OF WHEEZING: CPT | Performed by: INTERNAL MEDICINE

## 2021-08-31 PROCEDURE — 94729 DIFFUSING CAPACITY: CPT | Performed by: INTERNAL MEDICINE

## 2021-08-31 ASSESSMENT — PULMONARY FUNCTION TESTS
FEV1/FVC_PERCENT_PREDICTED: 53
FEV1: .89
FEV1/FVC: 42
FVC_PERCENT_PREDICTED: 47
FEV1/FVC_PERCENT_PREDICTED: 78
FVC_PERCENT_PREDICTED: 53
FVC: 1.78
FVC: 2.01
FEV1_PERCENT_PREDICTED: 28
FEV1/FVC: 50
FEV1/FVC_PERCENT_PREDICTED: 53
FEV1_LLN: 2.43
FEV1/FVC_PERCENT_PREDICTED: 63
FEV1_PERCENT_CHANGE: -5
FEV1_PERCENT_PREDICTED: 30
FEV1/FVC_PERCENT_LLN: 65
FEV1/FVC: 50
FEV1/FVC_PERCENT_CHANGE: -16
FEV1/FVC_PERCENT_PREDICTED: 64
FVC_LLN: 3.12
FVC_PREDICTED: 3.74
FEV1_PREDICTED: 2.91
FEV1/FVC_PERCENT_CHANGE: -42
FEV1_PERCENT_CHANGE: 12
FEV1/FVC_PREDICTED: 78
FEV1: .84
FEV1/FVC: 41.79

## 2021-08-31 ASSESSMENT — FIBROSIS 4 INDEX: FIB4 SCORE: 1.11

## 2021-08-31 NOTE — PROCEDURES
Tech: Aliya Salomon, RRT, CPFT  Tech notes: Good patient effort & cooperation.  Light headedness after FVC.  Audible wheezing during MVV.  The results of this test meet the ATS/ERS standards for acceptability & reproducibility.  Test was performed on the [employer] Plethysmograph-Elite DX system.  Predicted values were GLI-2012 for spirometry, GLI- 2017 for DLCO, ITS for Lung Volumes.  The DLCO was uncorrected for Hgb.  A bronchodilator of Ventolin HFA -2puffs via spacer administered.  DLCO performed during dilation period.    1. Baseline spirometry demonstrates a severe reduction in FEV1 and FVC. FEV1/FVC ratio is reduced at 50%.  FEV1 is 0.89 L which is 30% of predicted.    2. After administration of an inhaled bronchodilator there is no improvement in FEV1.    3. Lung volumes demonstrate hyperinflation.    4. Gas exchange as estimated by DLCO is reduced at 63% of predicted.    5. Airway resistance is increased.      Impression:    This study demonstrates the presence of severe obstructive lung disease.  No reversibility is noted on the study.

## 2021-09-08 ENCOUNTER — OFFICE VISIT (OUTPATIENT)
Dept: CARDIOLOGY | Facility: MEDICAL CENTER | Age: 61
End: 2021-09-08
Payer: COMMERCIAL

## 2021-09-08 VITALS
WEIGHT: 198 LBS | OXYGEN SATURATION: 92 % | BODY MASS INDEX: 26.82 KG/M2 | DIASTOLIC BLOOD PRESSURE: 62 MMHG | SYSTOLIC BLOOD PRESSURE: 110 MMHG | HEART RATE: 72 BPM | RESPIRATION RATE: 16 BRPM | HEIGHT: 72 IN

## 2021-09-08 DIAGNOSIS — Z95.5 PRESENCE OF DRUG COATED STENT IN RIGHT CORONARY ARTERY: Chronic | ICD-10-CM

## 2021-09-08 DIAGNOSIS — Z86.79 S/P RADIOFREQUENCY ABLATION OPERATION FOR ARRHYTHMIA: ICD-10-CM

## 2021-09-08 DIAGNOSIS — I25.10 CORONARY ARTERY DISEASE DUE TO LIPID RICH PLAQUE: Chronic | ICD-10-CM

## 2021-09-08 DIAGNOSIS — I47.10 SVT (SUPRAVENTRICULAR TACHYCARDIA) (HCC): ICD-10-CM

## 2021-09-08 DIAGNOSIS — E78.5 DYSLIPIDEMIA: Chronic | ICD-10-CM

## 2021-09-08 DIAGNOSIS — I25.83 CORONARY ARTERY DISEASE DUE TO LIPID RICH PLAQUE: Chronic | ICD-10-CM

## 2021-09-08 DIAGNOSIS — I48.0 PAF (PAROXYSMAL ATRIAL FIBRILLATION) (HCC): Chronic | ICD-10-CM

## 2021-09-08 DIAGNOSIS — Z98.890 S/P RADIOFREQUENCY ABLATION OPERATION FOR ARRHYTHMIA: ICD-10-CM

## 2021-09-08 PROCEDURE — 99214 OFFICE O/P EST MOD 30 MIN: CPT | Performed by: INTERNAL MEDICINE

## 2021-09-08 RX ORDER — PREDNISONE 50 MG/1
TABLET ORAL
COMMUNITY
Start: 2021-05-04 | End: 2021-12-20 | Stop reason: SDUPTHER

## 2021-09-08 ASSESSMENT — FIBROSIS 4 INDEX: FIB4 SCORE: 1.11

## 2021-09-08 ASSESSMENT — ENCOUNTER SYMPTOMS
ABDOMINAL PAIN: 0
FALLS: 0
NAUSEA: 0
WEAKNESS: 0
CLAUDICATION: 0
PND: 0
COUGH: 0
FOCAL WEAKNESS: 0
CHILLS: 0
BRUISES/BLEEDS EASILY: 0
FEVER: 0
BLURRED VISION: 0
PALPITATIONS: 0
SHORTNESS OF BREATH: 1
SORE THROAT: 0
DIZZINESS: 0

## 2021-09-08 NOTE — PROGRESS NOTES
"Chief Complaint   Patient presents with   • Atrial Fibrillation     Dx: PAF (paroxysmal atrial fibrillation) (Formerly [facility]) - s/p ablation    • Coronary Artery Disease     Dx: Coronary artery disease due to lipid rich plaque - moderate RCA FFR negative 2015   • Palpitations     Dx: Palpitations       Subjective     Lc Briggs is a 60 y.o. female who presents today for follow-up of her history of chronic disease with history of ablation    She is doing well the last 9 months unfortunately her brother  of COPD last November after I saw her    She is back at work    She rides her bike 5 miles every morning    Past Medical History:   Diagnosis Date   • Arthritis current    knees, hands, elbows   • ASTHMA     recurring   • Atrial fibrillation ([facility])    • Breath shortness 2019    uses oxygen at 3 liters/min cont. \"[employer]erred\" oxygen company   • Bronchitis    • Cancer ([facility]ly [facility]) 1980s    Hodgkins lymphoma   • COPD with acute exacerbation ([facility]) 2015   • Coronary artery disease due to lipid rich plaque - moderate RCA FFR negative    • Diabetes ([facility])    • Dyslipidemia    • High cholesterol 2019   • Hypertension    • Indigestion current    tx with OTC rolaids   • MI (myocardial infarction) ([facility])    • Oxygen dependent    • PAF (paroxysmal atrial fibrillation) ([facility]) - s/p ablation     • Persistent atrial fibrillation ([facility]) 2016   • Pneumonia    • Pulmonary emphysema ([facility])    • Pulmonary nodule    • Restless leg syndrome    • Sepsis ([facility]) 2015   • Sleep apnea      Past Surgical History:   Procedure Laterality Date   • CARDIAC CATH, RIGHT/LEFT HEART     • OTHER CARDIAC SURGERY     • WEDGE RESECTION LUNG       Family History   Problem Relation Age of Onset   • Heart Disease Mother    • Other Mother 72        pacemaker   • Lung Disease Father         severe asthma   • Alcohol/Drug Maternal Grandfather    • Lung Disease Paternal Grandmother      Social History     Socioeconomic History   • " "Marital status:      Spouse name: Not on file   • Number of children: Not on file   • Years of education: Not on file   • Highest education level: Not on file   Occupational History   • Not on file   Tobacco Use   • Smoking status: Former Smoker     Packs/day: 1.00     Years: 35.00     Pack years: 35.00     Types: Cigarettes     Quit date: 3/21/2013     Years since quittin.4   • Smokeless tobacco: Never Used   • Tobacco comment: quit 4 days ago, smoked less than 1ppd   Vaping Use   • Vaping Use: Never used   Substance and Sexual Activity   • Alcohol use: No   • Drug use: No     Comment: former cocaine 'lots of it', former meth clean 9 years   • Sexual activity: Not on file   Other Topics Concern   • Not on file   Social History Narrative   • Not on file     Social Determinants of Health     Financial Resource Strain:    • Difficulty of Paying Living Expenses:    Food Insecurity:    • Worried About Running Out of Food in the Last Year:    • Ran Out of Food in the Last Year:    Transportation Needs:    • Lack of Transportation (Medical):    • Lack of Transportation (Non-Medical):    Physical Activity:    • Days of Exercise per Week:    • Minutes of Exercise per Session:    Stress:    • Feeling of Stress :    Social Connections:    • Frequency of Communication with Friends and Family:    • Frequency of Social Gatherings with Friends and Family:    • Attends Scientologist Services:    • Active Member of Clubs or Organizations:    • Attends Club or Organization Meetings:    • Marital Status:    Intimate Partner Violence:    • Fear of Current or Ex-Partner:    • Emotionally Abused:    • Physically Abused:    • Sexually Abused:      Allergies   Allergen Reactions   • Cephalexin Anaphylaxis and Shortness of Breath     Rxn date: .   • Clindamycin Anaphylaxis     Rxn date: .   • Codeine Rash   • Keflex Anaphylaxis   • Lisinopril Palpitations   • Penicillins Hives, Rash and Itching     Rxn date: \"As a child.\" " "  • Codeine Hives, Rash and Itching     Rxn date: \"As a child.\"   • Rosuvastatin Myalgia     Outpatient Encounter Medications as of 9/8/2021   Medication Sig Dispense Refill   • metformin (GLUCOPHAGE) 1000 MG tablet Indications: Twice a day     • predniSONE (DELTASONE) 50 MG Tab PREDNISONE 50 MG TABS     • nitroglycerin (NITROSTAT) 0.4 MG SL Tab PLACE ONE TABLET UNDER TONGUE EVERY 5 MINUTES AS NEEDED  25 tablet 0   • Tiotropium Bromide-Olodaterol (STIOLTO RESPIMAT) 2.5-2.5 MCG/ACT Aero Soln Take 2 Puffs by mouth every day. 3 Each 4   • ipratropium-albuterol (DUONEB) 0.5-2.5 (3) MG/3ML nebulizer solution use 1 vial via nebulizer every 4 hours as needed for shortness of breath 360 mL 4   • promethazine (PHENERGAN) 6.25 MG/5ML Syrup promethazine 6.25 mg/5 mL oral syrup     • albuterol (PROAIR HFA) 108 (90 Base) MCG/ACT Aero Soln inhalation aerosol Inhale 2 Puffs by mouth every four hours as needed for Shortness of Breath (wheezing). 1 Inhaler 1   • simvastatin (ZOCOR) 20 MG Tab simvastatin 20 mg tablet     • OXYGEN-HELIUM INH oxygen     • atenolol (TENORMIN) 25 MG Tab Take 25 mg by mouth every day.     • aspirin (ASA) 81 MG Chew Tab chewable tablet Take 81 mg by mouth every day.     • Tiotropium Bromide-Olodaterol (STIOLTO RESPIMAT) 2.5-2.5 MCG/ACT Aero Soln Take 2 Puffs by mouth every day. (Patient not taking: Reported on 9/8/2021) 1 Each 0   • Tiotropium Bromide-Olodaterol (STIOLTO RESPIMAT) 2.5-2.5 MCG/ACT Aero Soln Take 2 Puffs by mouth every day. (Patient not taking: Reported on 9/8/2021) 1 Each 0   • metFORMIN (GLUCOPHAGE) 500 MG Tab Take 1 Tab by mouth 2 times a day. (Patient not taking: Reported on 9/8/2021) 60 Tab 0     No facility-administered encounter medications on file as of 9/8/2021.     Review of Systems   Constitutional: Negative for chills and fever.   HENT: Negative for sore throat.    Eyes: Negative for blurred vision.   Respiratory: Positive for shortness of breath (improved). Negative for cough.  "   Cardiovascular: Negative for chest pain, palpitations, claudication, leg swelling and PND.   Gastrointestinal: Negative for abdominal pain and nausea.   Musculoskeletal: Negative for falls and joint pain.   Skin: Negative for rash.   Neurological: Negative for dizziness, focal weakness and weakness.   Endo/Heme/Allergies: Does not bruise/bleed easily.              Objective     /62 (BP Location: Left arm, Patient Position: Sitting, BP Cuff Size: Adult)   Pulse 72   Resp 16   Ht 1.829 m (6')   Wt 89.8 kg (198 lb)   LMP 01/01/2006   SpO2 92%   BMI 26.85 kg/m²     Physical Exam  Constitutional:       General: She is not in acute distress.     Appearance: She is not diaphoretic.   Eyes:      General: No scleral icterus.  Neck:      Vascular: No JVD.   Cardiovascular:      Rate and Rhythm: Normal rate.      Heart sounds: Normal heart sounds. No murmur heard.   No friction rub. No gallop.    Pulmonary:      Effort: No respiratory distress.      Breath sounds: No wheezing or rales.   Abdominal:      General: Bowel sounds are normal.      Palpations: Abdomen is soft.   Skin:     Findings: No rash.   Neurological:      Mental Status: She is alert.            We reviewed in person the most recent labs  Recent Results (from the past 5040 hour(s))   Comp Metabolic Panel    Collection Time: 02/17/21  6:58 AM   Result Value Ref Range    Sodium 137 135 - 145 mmol/L    Potassium 4.5 3.6 - 5.5 mmol/L    Chloride 99 96 - 112 mmol/L    Co2 30 20 - 33 mmol/L    Anion Gap 8.0 7.0 - 16.0    Glucose 174 (H) 65 - 99 mg/dL    Bun 15 8 - 22 mg/dL    Creatinine 0.59 0.50 - 1.40 mg/dL    Calcium 9.6 8.5 - 10.5 mg/dL    AST(SGOT) 15 12 - 45 U/L    ALT(SGPT) 19 2 - 50 U/L    Alkaline Phosphatase 76 30 - 99 U/L    Total Bilirubin 0.5 0.1 - 1.5 mg/dL    Albumin 4.3 3.2 - 4.9 g/dL    Total Protein 7.7 6.0 - 8.2 g/dL    Globulin 3.4 1.9 - 3.5 g/dL    A-G Ratio 1.3 g/dL   Lipid Profile    Collection Time: 02/17/21  6:58 AM   Result  Value Ref Range    Cholesterol,Tot 190 100 - 199 mg/dL    Triglycerides 126 0 - 149 mg/dL    HDL 50 >=40 mg/dL     (H) <100 mg/dL   CBC WITH DIFFERENTIAL    Collection Time: 02/17/21  6:58 AM   Result Value Ref Range    WBC 6.4 4.8 - 10.8 K/uL    RBC 4.60 4.20 - 5.40 M/uL    Hemoglobin 13.9 12.0 - 16.0 g/dL    Hematocrit 43.5 37.0 - 47.0 %    MCV 94.6 81.4 - 97.8 fL    MCH 30.2 27.0 - 33.0 pg    MCHC 32.0 (L) 33.6 - 35.0 g/dL    RDW 43.8 35.9 - 50.0 fL    Platelet Count 192 164 - 446 K/uL    MPV 10.4 9.0 - 12.9 fL    Neutrophils-Polys 61.80 44.00 - 72.00 %    Lymphocytes 29.10 22.00 - 41.00 %    Monocytes 5.20 0.00 - 13.40 %    Eosinophils 2.30 0.00 - 6.90 %    Basophils 1.30 0.00 - 1.80 %    Immature Granulocytes 0.30 0.00 - 0.90 %    Nucleated RBC 0.00 /100 WBC    Neutrophils (Absolute) 3.96 2.00 - 7.15 K/uL    Lymphs (Absolute) 1.86 1.00 - 4.80 K/uL    Monos (Absolute) 0.33 0.00 - 0.85 K/uL    Eos (Absolute) 0.15 0.00 - 0.51 K/uL    Baso (Absolute) 0.08 0.00 - 0.12 K/uL    Immature Granulocytes (abs) 0.02 0.00 - 0.11 K/uL    NRBC (Absolute) 0.00 K/uL   HEMOGLOBIN A1C    Collection Time: 02/17/21  6:58 AM   Result Value Ref Range    Glycohemoglobin 7.3 (H) 0.0 - 5.6 %    Est Avg Glucose 163 mg/dL   MICROALBUMIN CREAT RATIO URINE    Collection Time: 02/17/21  6:58 AM   Result Value Ref Range    Creatinine, Urine 79.03 mg/dL    Microalbumin, Urine Random 1.2 mg/dL    Micro Alb Creat Ratio 15 0 - 30 mg/g   TSH WITH REFLEX TO FT4    Collection Time: 02/17/21  6:58 AM   Result Value Ref Range    TSH 1.070 0.380 - 5.330 uIU/mL   FASTING STATUS    Collection Time: 02/17/21  6:58 AM   Result Value Ref Range    Fasting Status Fasting    ESTIMATED GFR    Collection Time: 02/17/21  6:58 AM   Result Value Ref Range    GFR If African American >60 >60 mL/min/1.73 m 2    GFR If Non African American >60 >60 mL/min/1.73 m 2   URINALYSIS    Collection Time: 02/17/21  6:59 AM   Result Value Ref Range    Color Yellow      Character Clear     Specific Gravity >=1.030 <1.035    Ph 6.0 5.0 - 8.0    Glucose Negative Negative mg/dL    Ketones Negative Negative mg/dL    Protein Negative Negative mg/dL    Bilirubin Negative Negative    Urobilinogen, Urine 0.2 Negative    Nitrite Negative Negative    Leukocyte Esterase Small (A) Negative    Occult Blood Negative Negative    Micro Urine Req Microscopic    URINE MICROSCOPIC (W/UA)    Collection Time: 02/17/21  6:59 AM   Result Value Ref Range    WBC 2-5 /hpf    RBC 0-2 /hpf    Bacteria Negative None /hpf    Epithelial Cells Few /hpf    Hyaline Cast 0-2 /lpf         Assessment & Plan     1. PAF (paroxysmal atrial fibrillation) ([facility]) - s/p ablation 2015     2. Dyslipidemia     3. Coronary artery disease due to lipid rich plaque - moderate RCA FFR negative 9/2015     4. Presence of drug coated stent in right coronary artery 2019     5. S/P radiofrequency ablation operation for arrhythmia     6. SVT (supraventricular tachycardia) ([facility])         Medical Decision Making: Today's Assessment/Status/Plan:          It was my pleasure to meet with Ms. Grant Briggs.    We addressed the management of hypertension at today's visit. Blood pressure is well controlled.  We specifically assessed the labs on hypertension treatment    We addressed the management of dyslipidemia at today's visit. She is on appropriate statin.    Consider SGLT2    I will see Ms. Grant Briggs back in 1 year time and encouraged her to follow up with us over the phone or electronically using my MyChart as issues arise.    It is my pleasure to participate in the care of Ms. Grant Briggs.  Please do not hesitate to contact me with questions or concerns.    Walker Adames MD PhD FAC  Cardiologist [facility]    Please note that this dictation was created using voice recognition software. There may be errors I did not discover before finalizing the note.

## 2021-09-21 ENCOUNTER — SLEEP CENTER VISIT (OUTPATIENT)
Dept: SLEEP MEDICINE | Facility: MEDICAL CENTER | Age: 61
End: 2021-09-21
Payer: COMMERCIAL

## 2021-09-21 VITALS
WEIGHT: 197 LBS | SYSTOLIC BLOOD PRESSURE: 130 MMHG | DIASTOLIC BLOOD PRESSURE: 60 MMHG | RESPIRATION RATE: 16 BRPM | OXYGEN SATURATION: 93 % | HEART RATE: 75 BPM | HEIGHT: 72 IN | TEMPERATURE: 97.5 F | BODY MASS INDEX: 26.68 KG/M2

## 2021-09-21 DIAGNOSIS — Z87.891 FORMER SMOKER: ICD-10-CM

## 2021-09-21 DIAGNOSIS — Z23 NEED FOR VACCINATION: ICD-10-CM

## 2021-09-21 DIAGNOSIS — J44.9 CHRONIC OBSTRUCTIVE PULMONARY DISEASE, UNSPECIFIED COPD TYPE (HCC): ICD-10-CM

## 2021-09-21 DIAGNOSIS — J96.11 CHRONIC RESPIRATORY FAILURE WITH HYPOXIA (HCC): ICD-10-CM

## 2021-09-21 PROCEDURE — 99214 OFFICE O/P EST MOD 30 MIN: CPT | Performed by: INTERNAL MEDICINE

## 2021-09-21 RX ORDER — TIOTROPIUM BROMIDE AND OLODATEROL 3.124; 2.736 UG/1; UG/1
2 SPRAY, METERED RESPIRATORY (INHALATION) DAILY
Qty: 1 EACH | Refills: 0 | COMMUNITY
Start: 2021-09-21 | End: 2021-11-09

## 2021-09-21 ASSESSMENT — ENCOUNTER SYMPTOMS
SHORTNESS OF BREATH: 1
DIARRHEA: 0
DIZZINESS: 0
SPEECH CHANGE: 0
EYE REDNESS: 0
FEVER: 0
HEADACHES: 0
PHOTOPHOBIA: 0
WEAKNESS: 0
WHEEZING: 0
PND: 0
NECK PAIN: 0
NAUSEA: 0
ABDOMINAL PAIN: 0
HEMOPTYSIS: 0
VOMITING: 0
DEPRESSION: 0
FOCAL WEAKNESS: 0
DOUBLE VISION: 0
DIAPHORESIS: 0
ORTHOPNEA: 0
EYE PAIN: 0
CHILLS: 0
WEIGHT LOSS: 0
STRIDOR: 0
PALPITATIONS: 0
CLAUDICATION: 0
BACK PAIN: 0
FALLS: 0
SPUTUM PRODUCTION: 0
EYE DISCHARGE: 0
HEARTBURN: 0
TREMORS: 0
CONSTIPATION: 0
BLURRED VISION: 0
SINUS PAIN: 0
SORE THROAT: 0
MYALGIAS: 0
COUGH: 0

## 2021-09-21 ASSESSMENT — FIBROSIS 4 INDEX: FIB4 SCORE: 1.11

## 2021-09-22 NOTE — PROGRESS NOTES
"Chief Complaint   Patient presents with   • COPD     last seen 5/28/21    • Results     PFT 8/31/21, alpha 1 lab 8/3/21          HPI: This patient is a 60 y.o. female whom is followed in our clinic for COPD c/b chronic hypoxic respiratory failure last seen by me on 5/28/21. PMHX is significant for ABY on 2LPM with OPO showing adequate oxygenation, atrial arrhythmia s/p ablation 2015, CAD. She is a former smoker with 35-pack-year history, quit 2013.  Patient has severe airflow obstruction with FEV1 of 1.12 previously 12/19, updated pulmonary function testing from August 31 show FEV1 of 0.9 L, 30% predicted.  There is hyperinflation, air trapping and reduced DLCO at 63% predicted.  All variables are slightly worse since 2019.  We did test her for alpha-1 antitrypsin and she is MM phenotype.  She has not been able to tolerate inhaled corticosteroids but has done well on Stiolto.  She has not been able to maintain regular controller therapy due to cost relying mainly on samples and short acting bronchodilators via MDI or nebulizer.  CT chest done for lung cancer screening 12/19 showed right lower lobe atelectasis versus pneumonitis as well as some atelectatic changes in the right middle lobe and right upper lobe and lingula.  No discrete nodules or lymphadenopathy.  She has declined surveillance imaging due to cost.  She has not required prednisone since her last clinic visit with me in May.  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  She is using supplemental oxygen with activity at 3 L pulsed.  No acute complaints today.    Past Medical History:   Diagnosis Date   • Arthritis current    knees, hands, elbows   • ASTHMA 2008    recurring   • Atrial fibrillation (HCC)    • Breath shortness 02/25/2019    uses oxygen at 3 liters/min cont. \"[employer]erred\" oxygen company   • Bronchitis    • Cancer (HCC) 1980s    Hodgkins lymphoma   • COPD with acute exacerbation (HCC) 2/25/2015   • Coronary artery disease due to " lipid rich plaque - moderate RCA FFR negative    • Diabetes ([facility])    • Dyslipidemia    • High cholesterol 2019   • Hypertension    • Indigestion current    tx with OTC rolaids   • MI (myocardial infarction) ([facility])    • Oxygen dependent    • PAF (paroxysmal atrial fibrillation) ([facility]) - s/p ablation     • Persistent atrial fibrillation ([facility]) 2016   • Pneumonia    • Pulmonary emphysema ([facility])    • Pulmonary nodule    • Restless leg syndrome    • Sepsis ([facility]) 2015   • Sleep apnea        Social History     Socioeconomic History   • Marital status:      Spouse name: Not on file   • Number of children: Not on file   • Years of education: Not on file   • Highest education level: Not on file   Occupational History   • Not on file   Tobacco Use   • Smoking status: Former Smoker     Packs/day: 1.00     Years: 35.00     Pack years: 35.00     Types: Cigarettes     Quit date: 3/21/2013     Years since quittin.5   • Smokeless tobacco: Never Used   • Tobacco comment: quit 4 days ago, smoked less than 1ppd   Vaping Use   • Vaping Use: Never used   Substance and Sexual Activity   • Alcohol use: No   • Drug use: No     Comment: former cocaine 'lots of it', former meth clean 9 years   • Sexual activity: Not on file   Other Topics Concern   • Not on file   Social History Narrative   • Not on file     Social Determinants of Health     Financial Resource Strain:    • Difficulty of Paying Living Expenses:    Food Insecurity:    • Worried About Running Out of Food in the Last Year:    • Ran Out of Food in the Last Year:    Transportation Needs:    • Lack of Transportation (Medical):    • Lack of Transportation (Non-Medical):    Physical Activity:    • Days of Exercise per Week:    • Minutes of Exercise per Session:    Stress:    • Feeling of Stress :    Social Connections:    • Frequency of Communication with Friends and Family:    • Frequency of Social Gatherings with Friends and Family:    • Attends Zoroastrian  Services:    • Active Member of Clubs or Organizations:    • Attends Club or Organization Meetings:    • Marital Status:    Intimate Partner Violence:    • Fear of Current or Ex-Partner:    • Emotionally Abused:    • Physically Abused:    • Sexually Abused:        Family History   Problem Relation Age of Onset   • Heart Disease Mother    • Other Mother 72        pacemaker   • Lung Disease Father         severe asthma   • Alcohol/Drug Maternal Grandfather    • Lung Disease Paternal Grandmother        Current Outpatient Medications on File Prior to Visit   Medication Sig Dispense Refill   • metformin (GLUCOPHAGE) 1000 MG tablet Indications: Twice a day     • predniSONE (DELTASONE) 50 MG Tab PREDNISONE 50 MG TABS     • nitroglycerin (NITROSTAT) 0.4 MG SL Tab PLACE ONE TABLET UNDER TONGUE EVERY 5 MINUTES AS NEEDED  25 tablet 0   • Tiotropium Bromide-Olodaterol (STIOLTO RESPIMAT) 2.5-2.5 MCG/ACT Aero Soln Take 2 Puffs by mouth every day. 3 Each 4   • ipratropium-albuterol (DUONEB) 0.5-2.5 (3) MG/3ML nebulizer solution use 1 vial via nebulizer every 4 hours as needed for shortness of breath 360 mL 4   • promethazine (PHENERGAN) 6.25 MG/5ML Syrup promethazine 6.25 mg/5 mL oral syrup     • albuterol (PROAIR HFA) 108 (90 Base) MCG/ACT Aero Soln inhalation aerosol Inhale 2 Puffs by mouth every four hours as needed for Shortness of Breath (wheezing). 1 Inhaler 1   • simvastatin (ZOCOR) 20 MG Tab simvastatin 20 mg tablet     • atenolol (TENORMIN) 25 MG Tab Take 25 mg by mouth every day.     • aspirin (ASA) 81 MG Chew Tab chewable tablet Take 81 mg by mouth every day.     • metFORMIN (GLUCOPHAGE) 500 MG Tab Take 1 Tab by mouth 2 times a day. (Patient not taking: Reported on 9/8/2021) 60 Tab 0   • OXYGEN-HELIUM INH oxygen       No current facility-administered medications on file prior to visit.       Cephalexin, Clindamycin, Codeine, Keflex, Lisinopril, Penicillins, Codeine, and Rosuvastatin      ROS:   Review of Systems    Constitutional: Negative for chills, diaphoresis, fever, malaise/fatigue and weight loss.   HENT: Negative for congestion, ear discharge, ear pain, hearing loss, nosebleeds, sinus pain, sore throat and tinnitus.    Eyes: Negative for blurred vision, double vision, photophobia, pain, discharge and redness.   Respiratory: Positive for shortness of breath. Negative for cough, hemoptysis, sputum production, wheezing and stridor.    Cardiovascular: Negative for chest pain, palpitations, orthopnea, claudication, leg swelling and PND.   Gastrointestinal: Negative for abdominal pain, constipation, diarrhea, heartburn, nausea and vomiting.   Genitourinary: Negative for dysuria and urgency.   Musculoskeletal: Negative for back pain, falls, joint pain, myalgias and neck pain.   Skin: Negative for itching and rash.   Neurological: Negative for dizziness, tremors, speech change, focal weakness, weakness and headaches.   Endo/Heme/Allergies: Negative for environmental allergies.   Psychiatric/Behavioral: Negative for depression.       /60 (BP Location: Right arm, Patient Position: Sitting, BP Cuff Size: Adult)   Pulse 75   Temp 36.4 °C (97.5 °F) (Temporal)   Resp 16   Ht 1.829 m (6')   Wt 89.4 kg (197 lb)   SpO2 93%   Physical Exam  Vitals reviewed.   Constitutional:       General: She is not in acute distress.     Appearance: Normal appearance. She is well-developed and normal weight.   HENT:      Head: Normocephalic and atraumatic.      Right Ear: External ear normal.      Left Ear: External ear normal.      Nose: Nose normal. No congestion.      Mouth/Throat:      Mouth: Mucous membranes are moist.      Pharynx: Oropharynx is clear. No oropharyngeal exudate.   Eyes:      General: No scleral icterus.     Extraocular Movements: Extraocular movements intact.      Conjunctiva/sclera: Conjunctivae normal.      Pupils: Pupils are equal, round, and reactive to light.   Neck:      Vascular: No JVD.      Trachea: No  tracheal deviation.   Cardiovascular:      Rate and Rhythm: Normal rate and regular rhythm.      Heart sounds: Normal heart sounds. No murmur heard.   No friction rub. No gallop.    Pulmonary:      Effort: Pulmonary effort is normal. No accessory muscle usage or respiratory distress.      Breath sounds: No wheezing or rales.      Comments: Decreased air movement throughout, few coarse crackles R mid-lung field  Abdominal:      General: There is no distension.      Palpations: Abdomen is soft.      Tenderness: There is no abdominal tenderness.   Musculoskeletal:         General: No tenderness or deformity. Normal range of motion.      Cervical back: Normal range of motion and neck supple.      Right lower leg: No edema.      Left lower leg: No edema.   Lymphadenopathy:      Cervical: No cervical adenopathy.   Skin:     General: Skin is warm and dry.      Findings: No rash.      Nails: There is no clubbing.   Neurological:      Mental Status: She is alert and oriented to person, place, and time.      Cranial Nerves: No cranial nerve deficit.      Gait: Gait normal.   Psychiatric:         Mood and Affect: Mood normal.         Behavior: Behavior normal.         PFTs as reviewed by me personally: as per hPI    Imagaing as reviewed by me personally:  As per hPI    Assessment:  1. Chronic obstructive pulmonary disease, unspecified COPD type (HCC)     2. Chronic respiratory failure with hypoxia (HCC)     3. Former smoker     4. Need for vaccination  Pneumococal Polysaccharide Vaccine 23-Valent =>3YO SQ/IM       Plan:  1.  Chronic, severe and progressive.  Normal alpha-1 antitrypsin phenotype.  She is tobacco free.  Update vaccines with pneumococcal vaccination today.  Unfortunately we have not been able to keep her on a regular controller regimen and she is working therefore pulmonary rehab is not practical for her.  We will continue short acting bronchodilators as needed, continue Stiolto, continue supplemental oxygen with  activity.  2.  Secondary to COPD and chronic.  Oxygen needs are stable.  She would benefit from pulmonary rehab which we may consider in the future but currently she is working.  Continue supplemental oxygen at 2 L continuous or 3 L pulsed.  3.  Patient is tobacco free.  Encouraged ongoing abstinence.  She has declined further lung cancer screening.  4.  Patient provided with pneumococcal vaccination in clinic today.  Return in about 6 months (around 3/21/2022) for copd.

## 2021-10-13 RX ORDER — PREDNISONE 50 MG/1
50 TABLET ORAL DAILY
Qty: 5 TABLET | Refills: 0 | OUTPATIENT
Start: 2021-10-13

## 2021-10-13 NOTE — TELEPHONE ENCOUNTER
Last seen: 08/11/2021 by Dr. Guardado  Next appt: None    Was the patient seen in the last year in this department? Yes   Does patient have an active prescription for medications requested? No   Received Request Via: Pharmacy

## 2021-11-09 ENCOUNTER — OFFICE VISIT (OUTPATIENT)
Dept: MEDICAL GROUP | Facility: OTHER | Age: 61
End: 2021-11-09
Payer: COMMERCIAL

## 2021-11-09 VITALS
DIASTOLIC BLOOD PRESSURE: 70 MMHG | HEIGHT: 70 IN | TEMPERATURE: 97 F | OXYGEN SATURATION: 92 % | SYSTOLIC BLOOD PRESSURE: 120 MMHG | RESPIRATION RATE: 16 BRPM | WEIGHT: 191 LBS | HEART RATE: 78 BPM | BODY MASS INDEX: 27.35 KG/M2

## 2021-11-09 DIAGNOSIS — R30.0 DYSURIA: ICD-10-CM

## 2021-11-09 DIAGNOSIS — M70.21 OLECRANON BURSITIS OF RIGHT ELBOW: ICD-10-CM

## 2021-11-09 DIAGNOSIS — I25.83 CORONARY ARTERY DISEASE DUE TO LIPID RICH PLAQUE: Chronic | ICD-10-CM

## 2021-11-09 DIAGNOSIS — I25.10 CORONARY ARTERY DISEASE DUE TO LIPID RICH PLAQUE: Chronic | ICD-10-CM

## 2021-11-09 DIAGNOSIS — E78.5 DYSLIPIDEMIA: Chronic | ICD-10-CM

## 2021-11-09 DIAGNOSIS — L30.8 OTHER ECZEMA: ICD-10-CM

## 2021-11-09 DIAGNOSIS — Z00.00 EXAMINATION, MEDICAL, GENERAL: ICD-10-CM

## 2021-11-09 DIAGNOSIS — E11.9 TYPE 2 DIABETES MELLITUS WITHOUT COMPLICATION, WITHOUT LONG-TERM CURRENT USE OF INSULIN (HCC): ICD-10-CM

## 2021-11-09 DIAGNOSIS — J43.1 PANLOBULAR EMPHYSEMA (HCC): ICD-10-CM

## 2021-11-09 PROCEDURE — 99214 OFFICE O/P EST MOD 30 MIN: CPT | Performed by: FAMILY MEDICINE

## 2021-11-09 RX ORDER — PROMETHAZINE HYDROCHLORIDE 6.25 MG/5ML
6.25 SYRUP ORAL 4 TIMES DAILY PRN
Qty: 120 ML | Refills: 1 | Status: SHIPPED | OUTPATIENT
Start: 2021-11-09 | End: 2021-11-09 | Stop reason: SDUPTHER

## 2021-11-09 RX ORDER — PROMETHAZINE HYDROCHLORIDE 6.25 MG/5ML
6.25 SYRUP ORAL 4 TIMES DAILY PRN
Qty: 120 ML | Refills: 1 | Status: SHIPPED
Start: 2021-11-09 | End: 2021-11-19

## 2021-11-09 RX ORDER — LEVOFLOXACIN 500 MG/1
500 TABLET, FILM COATED ORAL DAILY
Qty: 5 TABLET | Refills: 0 | Status: SHIPPED
Start: 2021-11-09 | End: 2022-01-10

## 2021-11-09 RX ORDER — LEVOFLOXACIN 500 MG/1
500 TABLET, FILM COATED ORAL DAILY
Qty: 5 TABLET | Refills: 0 | Status: SHIPPED | OUTPATIENT
Start: 2021-11-09 | End: 2021-11-09 | Stop reason: SDUPTHER

## 2021-11-09 ASSESSMENT — ENCOUNTER SYMPTOMS: FEVER: 0

## 2021-11-09 ASSESSMENT — PATIENT HEALTH QUESTIONNAIRE - PHQ9: CLINICAL INTERPRETATION OF PHQ2 SCORE: 0

## 2021-11-09 ASSESSMENT — FIBROSIS 4 INDEX: FIB4 SCORE: 1.13

## 2021-11-09 NOTE — PROGRESS NOTES
Subjective:   Lc Briggs is a 61 y.o. female here for the evaluation and management of Follow-Up (wellness check )    COPD-stable with recent evaluation by pulmonary, discussed cancer screening and she was excepting of a screening chest CT.  She reports medications and oxygen are helpful    Coronary artery disease-stable with no current chest pain    History of supraventricular tachycardia-ongoing cardiology evaluation, occasional palpitations reported    Diabetes-stable but uncertain control, she has been involved in therapeutic lifestyle changes with associated weight loss and would like to get off Metformin    Chronic right elbow fullness posteriorly-my previous impression was of olecranon bursitis, previously aspirated, previous evaluation by orthopedic hand with consideration for advanced imaging, patient does not have the finances to consider advanced imaging and request a secondary evaluation  No problems updated.    Review of Systems   Constitutional: Negative for fever.   Dysuria reported with request for antibiotics, previously used levofloxacin successfully and recommended she wait to see if the dysuria clears with supportive management        Current Outpatient Medications   Medication Sig Dispense Refill   • metformin (GLUCOPHAGE) 1000 MG tablet Indications: Twice a day     • predniSONE (DELTASONE) 50 MG Tab PREDNISONE 50 MG TABS     • nitroglycerin (NITROSTAT) 0.4 MG SL Tab PLACE ONE TABLET UNDER TONGUE EVERY 5 MINUTES AS NEEDED  25 tablet 0   • Tiotropium Bromide-Olodaterol (STIOLTO RESPIMAT) 2.5-2.5 MCG/ACT Aero Soln Take 2 Puffs by mouth every day. 3 Each 4   • ipratropium-albuterol (DUONEB) 0.5-2.5 (3) MG/3ML nebulizer solution use 1 vial via nebulizer every 4 hours as needed for shortness of breath 360 mL 4   • promethazine (PHENERGAN) 6.25 MG/5ML Syrup promethazine 6.25 mg/5 mL oral syrup     • albuterol (PROAIR HFA) 108 (90 Base) MCG/ACT Aero Soln inhalation aerosol Inhale 2 Puffs  "by mouth every four hours as needed for Shortness of Breath (wheezing). 1 Inhaler 1   • simvastatin (ZOCOR) 20 MG Tab simvastatin 20 mg tablet     • OXYGEN-HELIUM INH oxygen     • atenolol (TENORMIN) 25 MG Tab Take 25 mg by mouth every day.     • Tiotropium Bromide-Olodaterol (STIOLTO RESPIMAT) 2.5-2.5 MCG/ACT Aero Soln Take 2 Puffs by mouth every day. 1 Each 0   • metFORMIN (GLUCOPHAGE) 500 MG Tab Take 1 Tab by mouth 2 times a day. (Patient not taking: Reported on 9/8/2021) 60 Tab 0   • aspirin (ASA) 81 MG Chew Tab chewable tablet Take 81 mg by mouth every day. (Patient not taking: Reported on 11/9/2021)       No current facility-administered medications for this visit.     Allergies  Cephalexin, Clindamycin, Codeine, Keflex, Lisinopril, Penicillins, Codeine, and Rosuvastatin    Past Medical History:   Diagnosis Date   • Arthritis current    knees, hands, elbows   • ASTHMA 2008    recurring   • Atrial fibrillation ([facility])    • Breath shortness 02/25/2019    uses oxygen at 3 liters/min cont. \"[employer]erred\" oxygen company   • Bronchitis    • Cancer ([facility]) 1980s    Hodgkins lymphoma   • COPD with acute exacerbation ([facility]) 2/25/2015   • Coronary artery disease due to lipid rich plaque - moderate RCA FFR negative    • Diabetes ([facility])    • Dyslipidemia    • High cholesterol 02/25/2019   • Hypertension    • Indigestion current    tx with OTC rolaids   • MI (myocardial infarction) ([facility])    • Oxygen dependent    • PAF (paroxysmal atrial fibrillation) ([facility]) - s/p ablation 2015    • Persistent atrial fibrillation ([facility]) 2/9/2016   • Pneumonia    • Pulmonary emphysema ([facility])    • Pulmonary nodule    • Restless leg syndrome    • Sepsis ([facility]) 2/25/2015   • Sleep apnea      Patient Active Problem List    Diagnosis Date Noted   • Presence of drug coated stent in right coronary artery 2019    • PAF (paroxysmal atrial fibrillation) ([facility]) - s/p ablation 2015    • Dyslipidemia    • S/P radiofrequency ablation operation for arrhythmia 03/01/2017 " "  • SVT (supraventricular tachycardia) ([facility]) 04/11/2016   • Anxiety 03/21/2016   • Coronary artery disease due to lipid rich plaque - moderate RCA FFR negative 9/2015    • Panlobular emphysema ([facility]) 02/09/2016   • Overweight 02/09/2016   • Sleep apnea 02/09/2016   • Type 2 diabetes mellitus without complication ([facility]) 02/09/2016       Past Surgical History  Past Surgical History:   Procedure Laterality Date   • CARDIAC CATH, RIGHT/LEFT HEART     • OTHER CARDIAC SURGERY     • WEDGE RESECTION LUNG          Objective:     Vitals:    11/09/21 0828   BP: 120/70   BP Location: Right arm   Patient Position: Sitting   BP Cuff Size: Adult   Pulse: 78   Resp: 16   Temp: 36.1 °C (97 °F)   TempSrc: Temporal   SpO2: 92%   Weight: 86.6 kg (191 lb)   Height: 1.778 m (5' 10\")     Body mass index is 27.41 kg/m².     Physical Exam  Constitutional:       Appearance: Normal appearance.   HENT:      Head: Normocephalic.   Eyes:      Extraocular Movements: Extraocular movements intact.      Conjunctiva/sclera: Conjunctivae normal.   Cardiovascular:      Rate and Rhythm: Normal rate and regular rhythm.      Heart sounds: Normal heart sounds.   Pulmonary:      Effort: Pulmonary effort is normal.      Breath sounds: Normal breath sounds.   Skin:     General: Skin is warm and dry.   Neurological:      Mental Status: She is alert and oriented to person, place, and time. Mental status is at baseline.   Psychiatric:         Mood and Affect: Mood normal.         Behavior: Behavior normal.     Right Elbow-inspection demonstrates substantial enlargement located in the area of the olecranon bursa measuring approximately 3 cm in diameter with some firmness on palpation but is nontender    Assessment and Plan:   Lc Briggs is a 61 y.o. female with a Follow-Up (wellness check )     The following was discussed with the patient today.    Problem List Items Addressed This Visit     None        Medications reviewed, refills provided, reviewed " and discussed recent pulmonary evaluation and cardiology evaluation, discussed treatment options for her right elbow and support a second opinion from orthopedics.  My clinical impression is she has chronic olecranon bursitis.  Laboratory studies recommended along with urinalysis for further evaluation of urinary complaints.  Close follow-up  Followup: No follow-ups on file.    Gaetano Guardado M.D.    Please note that this dictation was created using voice recognition software. I have made every reasonable attempt to correct obvious errors, but I expect that there are errors of grammar and possibly content that I did not discover before finalizing the note.

## 2021-11-17 ENCOUNTER — TELEPHONE (OUTPATIENT)
Dept: HEMATOLOGY ONCOLOGY | Facility: MEDICAL CENTER | Age: 61
End: 2021-11-17

## 2021-11-17 NOTE — TELEPHONE ENCOUNTER
Received referral to lung cancer screening program.  Chart review to assess for lung cancer screening program eligibility.   1. Age 55-77 yrs of age? Yes 61 y.o.  2. 30 pack year hx of smoking, or greater? Yes 1 [id number]= [id number] hx  3. Current smoker or if quit, has pt quit within last 15 yrs?Yes  Quit 3/21/2013  4. Any signs or symptoms of lung cancer? None noted  5. Previous history of lung cancer? None noted  6. Chest CT within past 12 mos.? None noted  Patient does meet eligibility criteria. [facility] scheduling notified to schedule the shared decision making visit.

## 2021-12-06 ENCOUNTER — TELEPHONE (OUTPATIENT)
Dept: MEDICAL GROUP | Facility: OTHER | Age: 61
End: 2021-12-06

## 2021-12-07 DIAGNOSIS — L30.8 OTHER ECZEMA: ICD-10-CM

## 2021-12-07 NOTE — TELEPHONE ENCOUNTER
----- Message from I[facility] sent at 12/6/2021 11:07 AM PST -----  Regarding: lab results  Hello,    Pt would like to get a call back about lab results.     Thank you

## 2021-12-08 ENCOUNTER — TELEPHONE (OUTPATIENT)
Dept: MEDICAL GROUP | Facility: OTHER | Age: 61
End: 2021-12-08

## 2021-12-08 NOTE — TELEPHONE ENCOUNTER
Dr. Guardado when you get a chance patient would like to know her lab results. Her results are in here chart. For you to review

## 2021-12-08 NOTE — TELEPHONE ENCOUNTER
I called and left a detailed message regarding recent laboratory studies and imaging with benign findings.

## 2021-12-08 NOTE — TELEPHONE ENCOUNTER
Received request via: Pharmacy    Was the patient seen in the last year in this department? Yes    Does the patient have an active prescription (recently filled or refills available) for medication(s) requested? No     Insurance won't cover name brand rx of Clobetasol Propionate 0.025 % Cream, change  dose to 0.5 have generic approved.

## 2021-12-10 ENCOUNTER — TELEPHONE (OUTPATIENT)
Dept: MEDICAL GROUP | Facility: OTHER | Age: 61
End: 2021-12-10

## 2021-12-16 DIAGNOSIS — J44.9 CHRONIC OBSTRUCTIVE PULMONARY DISEASE, UNSPECIFIED COPD TYPE (HCC): ICD-10-CM

## 2021-12-17 RX ORDER — TIOTROPIUM BROMIDE AND OLODATEROL 3.124; 2.736 UG/1; UG/1
2 SPRAY, METERED RESPIRATORY (INHALATION) DAILY
Qty: 1 EACH | Refills: 0 | Status: SHIPPED
Start: 2021-12-17 | End: 2022-03-22

## 2021-12-17 NOTE — TELEPHONE ENCOUNTER
Pt was contacted requesting sample of Stiolto     Have we ever prescribed this med? Yes.  If yes, what date? 5/28/21     Last OV: 9/21/21 Dr. Sands     Next OV: 3/22/22 Dr. Sands     DX: COPD     Medications: Sample Stiolto

## 2021-12-20 ENCOUNTER — TELEPHONE (OUTPATIENT)
Dept: MEDICAL GROUP | Facility: OTHER | Age: 61
End: 2021-12-20

## 2021-12-20 DIAGNOSIS — J44.1 CHRONIC OBSTRUCTIVE PULMONARY DISEASE WITH ACUTE EXACERBATION (HCC): ICD-10-CM

## 2021-12-20 RX ORDER — PREDNISONE 50 MG/1
50 TABLET ORAL DAILY
Qty: 5 TABLET | Refills: 0 | Status: SHIPPED | OUTPATIENT
Start: 2021-12-20 | End: 2021-12-25

## 2021-12-20 NOTE — TELEPHONE ENCOUNTER
Patient called requesting prescriptions for Prednizone and a Z-Pack as she is quite ill and didn't know if she should come in with the symptoms she's experiencing. She stated that she will be going to the pharmacy to get a rapid COVID Test. Her phone number for follow up is [phone].

## 2021-12-20 NOTE — TELEPHONE ENCOUNTER
Phone Number Called: [phone]    Call outcome: Spoke to patient regarding message below.     Message: I have return the patient phone call back,she is requesting something to open her air ways.

## 2021-12-21 NOTE — TELEPHONE ENCOUNTER
Phone Number Called: [phone]    Call outcome: Left detailed message for patient. Informed to call back with any additional questions.    Message: I have called the patient back letting her know Dr. Guardado went ahead and called in some medication. No answer left message to call me back with any questions.

## 2022-01-04 DIAGNOSIS — J44.9 CHRONIC OBSTRUCTIVE PULMONARY DISEASE, UNSPECIFIED COPD TYPE (HCC): ICD-10-CM

## 2022-01-04 RX ORDER — ALBUTEROL SULFATE 90 UG/1
2 AEROSOL, METERED RESPIRATORY (INHALATION) EVERY 6 HOURS PRN
COMMUNITY
End: 2022-01-04

## 2022-01-05 RX ORDER — ALBUTEROL SULFATE 90 UG/1
2 AEROSOL, METERED RESPIRATORY (INHALATION) EVERY 4 HOURS PRN
Qty: 18 G | Refills: 3 | Status: SHIPPED
Start: 2022-01-05 | End: 2022-01-10

## 2022-01-05 RX ORDER — ALBUTEROL SULFATE 90 UG/1
2 AEROSOL, METERED RESPIRATORY (INHALATION) EVERY 4 HOURS PRN
Qty: 1 G | Refills: 3 | Status: SHIPPED
Start: 2022-01-05 | End: 2022-01-10

## 2022-01-10 ENCOUNTER — OFFICE VISIT (OUTPATIENT)
Dept: MEDICAL GROUP | Facility: OTHER | Age: 62
End: 2022-01-10
Payer: COMMERCIAL

## 2022-01-10 VITALS
TEMPERATURE: 97.4 F | OXYGEN SATURATION: 91 % | BODY MASS INDEX: 32.61 KG/M2 | HEART RATE: 70 BPM | SYSTOLIC BLOOD PRESSURE: 110 MMHG | HEIGHT: 64 IN | WEIGHT: 191 LBS | DIASTOLIC BLOOD PRESSURE: 62 MMHG

## 2022-01-10 DIAGNOSIS — I25.10 CORONARY ARTERY DISEASE DUE TO LIPID RICH PLAQUE: ICD-10-CM

## 2022-01-10 DIAGNOSIS — E78.5 DYSLIPIDEMIA: ICD-10-CM

## 2022-01-10 DIAGNOSIS — L30.8 OTHER ECZEMA: ICD-10-CM

## 2022-01-10 DIAGNOSIS — J44.9 CHRONIC OBSTRUCTIVE PULMONARY DISEASE, UNSPECIFIED COPD TYPE (HCC): ICD-10-CM

## 2022-01-10 DIAGNOSIS — I25.83 CORONARY ARTERY DISEASE DUE TO LIPID RICH PLAQUE: ICD-10-CM

## 2022-01-10 DIAGNOSIS — E11.9 TYPE 2 DIABETES MELLITUS WITHOUT COMPLICATION, WITHOUT LONG-TERM CURRENT USE OF INSULIN (HCC): ICD-10-CM

## 2022-01-10 DIAGNOSIS — J43.1 PANLOBULAR EMPHYSEMA (HCC): ICD-10-CM

## 2022-01-10 PROCEDURE — 99214 OFFICE O/P EST MOD 30 MIN: CPT | Performed by: FAMILY MEDICINE

## 2022-01-10 RX ORDER — SIMVASTATIN 20 MG
20 TABLET ORAL EVERY EVENING
Qty: 90 TABLET | Refills: 3 | Status: SHIPPED | OUTPATIENT
Start: 2022-01-10 | End: 2022-09-27 | Stop reason: SDUPTHER

## 2022-01-10 RX ORDER — ALBUTEROL SULFATE 90 UG/1
2 AEROSOL, METERED RESPIRATORY (INHALATION) EVERY 4 HOURS PRN
Qty: 18 G | Refills: 3 | Status: SHIPPED
Start: 2022-01-10 | End: 2022-01-10

## 2022-01-10 RX ORDER — ALBUTEROL SULFATE 90 UG/1
2 AEROSOL, METERED RESPIRATORY (INHALATION) EVERY 4 HOURS PRN
Qty: 18 G | Refills: 3 | Status: SHIPPED | OUTPATIENT
Start: 2022-01-10 | End: 2022-01-10 | Stop reason: SDUPTHER

## 2022-01-10 RX ORDER — ALBUTEROL SULFATE 90 UG/1
2 AEROSOL, METERED RESPIRATORY (INHALATION) EVERY 4 HOURS PRN
Qty: 1 EACH | Refills: 11 | Status: SHIPPED | OUTPATIENT
Start: 2022-01-10 | End: 2022-03-22 | Stop reason: SDUPTHER

## 2022-01-10 RX ORDER — ATENOLOL 25 MG/1
25 TABLET ORAL DAILY
Qty: 90 TABLET | Refills: 3 | Status: SHIPPED | OUTPATIENT
Start: 2022-01-10 | End: 2022-09-27 | Stop reason: SDUPTHER

## 2022-01-10 RX ORDER — PREDNISONE 50 MG/1
50 TABLET ORAL DAILY
Qty: 5 TABLET | Refills: 0 | Status: SHIPPED
Start: 2022-01-10 | End: 2022-02-16

## 2022-01-10 ASSESSMENT — ENCOUNTER SYMPTOMS
SPUTUM PRODUCTION: 1
COUGH: 1
CONSTITUTIONAL NEGATIVE: 1
SHORTNESS OF BREATH: 1

## 2022-01-10 ASSESSMENT — FIBROSIS 4 INDEX: FIB4 SCORE: 1.291238469992825914

## 2022-01-10 NOTE — PROGRESS NOTES
Subjective:   Lc Briggs is a 61 y.o. female here for the evaluation and management of No chief complaint on file.    Coronary artery disease- stable with ongoing medications    Dyslipidemia-stable with current treatment plan, laboratory studies reviewed    Emphysema-increase sputum production with greenish discoloration which has been trending toward a more whitish coloration, cough with concern for infection.  Ongoing oxygen supplementation    Diabetes-stable  No problems updated.    Review of Systems   Constitutional: Negative.    Respiratory: Positive for cough, sputum production and shortness of breath.        Current Outpatient Medications   Medication Sig Dispense Refill   • albuterol (PROAIR HFA) 108 (90 Base) MCG/ACT Aero Soln inhalation aerosol Inhale 2 Puffs every four hours as needed for Shortness of Breath (wheezing). 18 g 3   • albuterol (PROAIR HFA) 108 (90 Base) MCG/ACT Aero Soln inhalation aerosol Inhale 2 Puffs every four hours as needed for Shortness of Breath (wheezing). 1 g 3   • Tiotropium Bromide-Olodaterol (STIOLTO RESPIMAT) 2.5-2.5 MCG/ACT Aero Soln Take 2 Puffs by mouth every day. 1 Each 0   • Clobetasol Propionate 0.025 % Cream Apply 1 Inch topically 1 time a day as needed. 1 Each 2   • levoFLOXacin (LEVAQUIN) 500 MG tablet Take 1 Tablet by mouth every day. 5 Tablet 0   • metformin (GLUCOPHAGE) 1000 MG tablet Indications: Twice a day     • nitroglycerin (NITROSTAT) 0.4 MG SL Tab PLACE ONE TABLET UNDER TONGUE EVERY 5 MINUTES AS NEEDED  25 tablet 0   • ipratropium-albuterol (DUONEB) 0.5-2.5 (3) MG/3ML nebulizer solution use 1 vial via nebulizer every 4 hours as needed for shortness of breath 360 mL 4   • metFORMIN (GLUCOPHAGE) 500 MG Tab Take 1 Tab by mouth 2 times a day. (Patient not taking: Reported on 9/8/2021) 60 Tab 0   • simvastatin (ZOCOR) 20 MG Tab simvastatin 20 mg tablet     • OXYGEN-HELIUM INH oxygen     • atenolol (TENORMIN) 25 MG Tab Take 25 mg by mouth every day.   "   • aspirin (ASA) 81 MG Chew Tab chewable tablet Take 81 mg by mouth every day. (Patient not taking: Reported on 11/9/2021)       No current facility-administered medications for this visit.     Allergies  Cephalexin, Clindamycin, Codeine, Keflex, Lisinopril, Penicillins, Codeine, and Rosuvastatin    Past Medical History:   Diagnosis Date   • Arthritis current    knees, hands, elbows   • ASTHMA 2008    recurring   • Atrial fibrillation ([facility])    • Breath shortness 02/25/2019    uses oxygen at 3 liters/min cont. \"Preferred\" oxygen company   • Bronchitis    • Cancer ([facility]) 1980s    Hodgkins lymphoma   • COPD with acute exacerbation ([facility]) 2/25/2015   • Coronary artery disease due to lipid rich plaque - moderate RCA FFR negative    • Diabetes ([facility])    • Dyslipidemia    • High cholesterol 02/25/2019   • Hypertension    • Indigestion current    tx with OTC rolaids   • MI (myocardial infarction) ([facility])    • Oxygen dependent    • PAF (paroxysmal atrial fibrillation) ([facility]) - s/p ablation 2015    • Persistent atrial fibrillation ([facility]) 2/9/2016   • Pneumonia    • Pulmonary emphysema ([facility])    • Pulmonary nodule    • Restless leg syndrome    • Sepsis ([facility]) 2/25/2015   • Sleep apnea      Patient Active Problem List    Diagnosis Date Noted   • Olecranon bursitis of right elbow 11/09/2021   • Dysuria 11/09/2021   • Presence of drug coated stent in right coronary artery 2019    • PAF (paroxysmal atrial fibrillation) ([facility]) - s/p ablation 2015    • Dyslipidemia    • S/P radiofrequency ablation operation for arrhythmia 03/01/2017   • SVT (supraventricular tachycardia) ([facility]) 04/11/2016   • Anxiety 03/21/2016   • Coronary artery disease due to lipid rich plaque - moderate RCA FFR negative 9/2015    • Panlobular emphysema ([facility]) 02/09/2016   • Overweight 02/09/2016   • Sleep apnea 02/09/2016   • Type 2 diabetes mellitus without complication ([facility]) 02/09/2016       Past Surgical History  Past Surgical History:   Procedure Laterality Date   • " CARDIAC CATH, RIGHT/LEFT HEART     • OTHER CARDIAC SURGERY     • WEDGE RESECTION LUNG          Objective:   There were no vitals filed for this visit.  There is no height or weight on file to calculate BMI.     Physical Exam  Constitutional:       Appearance: Normal appearance.   HENT:      Head: Normocephalic.   Eyes:      Extraocular Movements: Extraocular movements intact.      Conjunctiva/sclera: Conjunctivae normal.   Neurological:      Mental Status: She is alert. Mental status is at baseline.   Psychiatric:         Mood and Affect: Mood normal.         Behavior: Behavior normal.     Mildly diminished breath sounds bilaterally    Assessment and Plan:   Lc Briggs is a 61 y.o. female with a No chief complaint on file.     The following was discussed with the patient today.    Problem List Items Addressed This Visit     None        Medications reviewed and refills provided, discussed treatment options for COPD exacerbation with recommendation for a prednisone burst given recent use of antibiotics.  Reviewed recent laboratory studies with stable findings, close follow-up as planned in approximately 4 to 6 weeks.    Followup: No follow-ups on file.    Gaetano Guardado M.D.    Please note that this dictation was created using voice recognition software. I have made every reasonable attempt to correct obvious errors, but I expect that there are errors of grammar and possibly content that I did not discover before finalizing the note.

## 2022-01-18 DIAGNOSIS — I20.0 UNSTABLE ANGINA (HCC): ICD-10-CM

## 2022-01-20 RX ORDER — NITROGLYCERIN 0.4 MG/1
0.4 TABLET SUBLINGUAL PRN
Qty: 25 TABLET | Refills: 1 | Status: SHIPPED | OUTPATIENT
Start: 2022-01-20 | End: 2023-04-24 | Stop reason: SDUPTHER

## 2022-02-16 ENCOUNTER — OFFICE VISIT (OUTPATIENT)
Dept: MEDICAL GROUP | Facility: OTHER | Age: 62
End: 2022-02-16
Payer: COMMERCIAL

## 2022-02-16 ENCOUNTER — APPOINTMENT (OUTPATIENT)
Dept: RADIOLOGY | Facility: OTHER | Age: 62
End: 2022-02-16
Attending: FAMILY MEDICINE
Payer: COMMERCIAL

## 2022-02-16 VITALS
SYSTOLIC BLOOD PRESSURE: 116 MMHG | HEIGHT: 70 IN | TEMPERATURE: 97.4 F | WEIGHT: 182 LBS | BODY MASS INDEX: 26.05 KG/M2 | HEART RATE: 71 BPM | OXYGEN SATURATION: 93 % | RESPIRATION RATE: 16 BRPM | DIASTOLIC BLOOD PRESSURE: 70 MMHG

## 2022-02-16 DIAGNOSIS — M25.561 ACUTE PAIN OF RIGHT KNEE: ICD-10-CM

## 2022-02-16 DIAGNOSIS — M25.461 EFFUSION OF RIGHT KNEE JOINT: ICD-10-CM

## 2022-02-16 DIAGNOSIS — E11.9 TYPE 2 DIABETES MELLITUS WITHOUT COMPLICATION, WITHOUT LONG-TERM CURRENT USE OF INSULIN (HCC): ICD-10-CM

## 2022-02-16 DIAGNOSIS — M70.21 OLECRANON BURSITIS OF RIGHT ELBOW: ICD-10-CM

## 2022-02-16 PROCEDURE — 99214 OFFICE O/P EST MOD 30 MIN: CPT | Mod: 25 | Performed by: FAMILY MEDICINE

## 2022-02-16 PROCEDURE — 73562 X-RAY EXAM OF KNEE 3: CPT | Mod: TC,FY,RT | Performed by: FAMILY MEDICINE

## 2022-02-16 RX ORDER — METHYLPREDNISOLONE ACETATE 80 MG/ML
80 INJECTION, SUSPENSION INTRA-ARTICULAR; INTRALESIONAL; INTRAMUSCULAR; SOFT TISSUE ONCE
Status: COMPLETED | OUTPATIENT
Start: 2022-02-16 | End: 2022-02-16

## 2022-02-16 RX ADMIN — METHYLPREDNISOLONE ACETATE 80 MG: 80 INJECTION, SUSPENSION INTRA-ARTICULAR; INTRALESIONAL; INTRAMUSCULAR; SOFT TISSUE at 17:05

## 2022-02-16 ASSESSMENT — FIBROSIS 4 INDEX: FIB4 SCORE: 1.291238469992825914

## 2022-02-16 NOTE — PROGRESS NOTES
Subjective:   Lc Briggs is a 61 y.o. female here for the evaluation and management of Follow-Up (Rt knee,leg , possible blood clot )    Right knee pain and swelling-spontaneous onset, 3 weeks duration, progressive with pain levels currently high and limiting her ability to walk.    Diabetes-she reports has been under good control    Right olecranon bursitis-chronic, previous orthopedic evaluation with consideration for surgery but deferred based on her COPD  Problem   Effusion of Right Knee Joint       Review of Systems   Constitutional: Negative.    Cardiovascular: Positive for palpitations.   Musculoskeletal: Positive for joint pain.       Current Outpatient Medications   Medication Sig Dispense Refill   • nitroglycerin (NITROSTAT) 0.4 MG SL Tab Place 1 Tablet under the tongue as needed for Chest Pain (every 5 minutes up to 3 doses, if chest pain still present call 911). 25 Tablet 1   • simvastatin (ZOCOR) 20 MG Tab Take 1 Tablet by mouth every evening. 90 Tablet 3   • Clobetasol Propionate 0.025 % Cream Apply 1 Inch topically 1 time a day as needed. 1 Each 2   • atenolol (TENORMIN) 25 MG Tab Take 1 Tablet by mouth every day. 90 Tablet 3   • predniSONE (DELTASONE) 50 MG Tab Take 1 Tablet by mouth every day. 5 Tablet 0   • albuterol 108 (90 Base) MCG/ACT Aero Soln inhalation aerosol Inhale 2 Puffs every four hours as needed for Shortness of Breath. 1 Each 11   • Tiotropium Bromide-Olodaterol (STIOLTO RESPIMAT) 2.5-2.5 MCG/ACT Aero Soln Take 2 Puffs by mouth every day. 1 Each 0   • metformin (GLUCOPHAGE) 1000 MG tablet Indications: Twice a day     • ipratropium-albuterol (DUONEB) 0.5-2.5 (3) MG/3ML nebulizer solution use 1 vial via nebulizer every 4 hours as needed for shortness of breath 360 mL 4   • OXYGEN-HELIUM INH oxygen       No current facility-administered medications for this visit.     Allergies  Cephalexin, Clindamycin, Codeine, Keflex, Lisinopril, Penicillins, Codeine, and  "Rosuvastatin    Past Medical History:   Diagnosis Date   • Arthritis current    knees, hands, elbows   • ASTHMA 2008    recurring   • Atrial fibrillation ([facility])    • Breath shortness 02/25/2019    uses oxygen at 3 liters/min cont. \"Preferred\" oxygen company   • Bronchitis    • Cancer ([facility]) 1980s    Hodgkins lymphoma   • COPD with acute exacerbation ([facility]) 2/25/2015   • Coronary artery disease due to lipid rich plaque - moderate RCA FFR negative    • Diabetes ([facility])    • Dyslipidemia    • High cholesterol 02/25/2019   • Hypertension    • Indigestion current    tx with OTC rolaids   • MI (myocardial infarction) ([facility])    • Oxygen dependent    • PAF (paroxysmal atrial fibrillation) ([facility]) - s/p ablation 2015    • Persistent atrial fibrillation ([facility]) 2/9/2016   • Pneumonia    • Pulmonary emphysema ([facility])    • Pulmonary nodule    • Restless leg syndrome    • Sepsis ([facility]) 2/25/2015   • Sleep apnea      Patient Active Problem List    Diagnosis Date Noted   • Effusion of right knee joint 02/16/2022   • Olecranon bursitis of right elbow 11/09/2021   • Dysuria 11/09/2021   • Presence of drug coated stent in right coronary artery 2019    • PAF (paroxysmal atrial fibrillation) ([facility]) - s/p ablation 2015    • Dyslipidemia    • S/P radiofrequency ablation operation for arrhythmia 03/01/2017   • SVT (supraventricular tachycardia) ([facility]) 04/11/2016   • Anxiety 03/21/2016   • Coronary artery disease due to lipid rich plaque - moderate RCA FFR negative 9/2015    • Panlobular emphysema ([facility]) 02/09/2016   • Overweight 02/09/2016   • Sleep apnea 02/09/2016   • Type 2 diabetes mellitus without complication ([facility]) 02/09/2016       Past Surgical History  Past Surgical History:   Procedure Laterality Date   • CARDIAC CATH, RIGHT/LEFT HEART     • OTHER CARDIAC SURGERY     • WEDGE RESECTION LUNG          Objective:     Vitals:    02/16/22 1511   BP: 116/70   BP Location: Left arm   Patient Position: Sitting   BP Cuff Size: Adult   Pulse: 71   Resp: 16 " Statement Selected "  Temp: 36.3 °C (97.4 °F)   TempSrc: Temporal   SpO2: 93%   Weight: 82.6 kg (182 lb)   Height: 1.778 m (5' 10\")     Body mass index is 26.11 kg/m².     Physical Exam  Constitutional:       Appearance: Normal appearance.   HENT:      Head: Normocephalic.   Eyes:      Extraocular Movements: Extraocular movements intact.      Conjunctiva/sclera: Conjunctivae normal.   Cardiovascular:      Rate and Rhythm: Normal rate and regular rhythm.      Heart sounds: Normal heart sounds.   Pulmonary:      Effort: Pulmonary effort is normal.      Breath sounds: Normal breath sounds.   Skin:     General: Skin is warm and dry.   Neurological:      Mental Status: She is alert and oriented to person, place, and time. Mental status is at baseline.   Psychiatric:         Mood and Affect: Mood normal.         Behavior: Behavior normal.       Right knee-inspection demonstrates a large effusion, limited motion by pain, knee is slightly warm but there is no erythema and she is able to demonstrate motion up to approximately 90 degrees of flexion without significant discomfort, strength and sensation are adequate  Assessment and Plan:   Lc Briggs is a 61 y.o. female with a Follow-Up (Rt knee,leg , possible blood clot )     The following was discussed with the patient today.    Problem List Items Addressed This Visit     Effusion of right knee joint        Differential diagnosis was discussed regarding her right knee effusion, imaging was reviewed and discussed with mild arthritic findings identified, suspect rheumatologic causes of her right knee effusion with a strong suspicion of gout, discussed treatment options including aspiration and injection and she elected to proceed.  She reports her diabetes has been doing relatively well, we briefly discussed previous cardiology evaluations despite her ongoing palpitations with benign findings.  Leg exam is otherwise benign with no calf tenderness and negative Homans' sign.  At this " point I reassured her there is strong evidence to suggest the leg discomfort and knee swelling is secondary to an intra-articular process and not a DVT.  Followup: No follow-ups on file.    Gaetano Guardado M.D.    Please note that this dictation was created using voice recognition software. I have made every reasonable attempt to correct obvious errors, but I expect that there are errors of grammar and possibly content that I did not discover before finalizing the note.

## 2022-02-16 NOTE — LETTER
Patient: Lc Briggs  Provider: Gaetano Guardado M.D.    JOINT and SOFT TISSUE INJECTION CONSENT FORM     The purpose of this document is to provide written information regarding the risks, benefits and alternatives of joint injections. This is supplementary to the discussion you have had with the doctor.     Common indications: Tendonitis, Bursitis,  and Arthritis      The Procedure:  After the skin surface is thoroughly cleaned, the joint is entered with a needle attached to a syringe. At this point, either joint fluid can be obtained and sent for laboratory testing or medications can be injected. This technique also applies to injections into a bursa or tendon to treat tendonitis and bursitis. Joint injections are given to treat inflammatory and degenerative conditions, such as osteoarthritis. Corticosteroids are frequently used for this procedure as they are anti-inflammatory agents that slow down the accumulation of cells responsible for producing inflammation within the joint space.       Benefits:  You might receive the benefit of relief from pain and swelling with this procedure, but this cannot be guaranteed.       Risks:  No procedure is risk-free. The following risks are well recognized, but there may also be risks not included in this list that are unforeseen by the doctors.      • Allergic reactions to the medications    • Subsequent infection, although this is extremely rare.     • Development of ‘post-injection flare’ which is transient joint pain after the corticosteroid injection.    • Joint damage may result from frequent corticosteroid injections. Generally, repeated and numerous injections into the same site are discouraged.    • De-pigmentation (a whitening of the skin).    • Local fat atrophy (thinning of the skin) at the injection site.    • Rupture of a tendon near the area of the injection.    • Pain may be associated with this procedure and the healing process.      Alternatives:   Anti-inflammatory medications, physical therapy, specialist evaluation, or do nothing      Consent: I consent to the joint injection procedure. I have been informed of the benefits and risks as detailed above, and I accept.       ....................................................................................................................      Signature Lc Briggs, 2/16/2022

## 2022-02-17 PROCEDURE — 20610 DRAIN/INJ JOINT/BURSA W/O US: CPT | Mod: RT | Performed by: FAMILY MEDICINE

## 2022-02-17 RX ORDER — METHYLPREDNISOLONE ACETATE 80 MG/ML
80 INJECTION, SUSPENSION INTRA-ARTICULAR; INTRALESIONAL; INTRAMUSCULAR; SOFT TISSUE ONCE
OUTPATIENT
Start: 2022-02-17 | End: 2022-02-20

## 2022-02-17 ASSESSMENT — ENCOUNTER SYMPTOMS
CONSTITUTIONAL NEGATIVE: 1
PALPITATIONS: 1

## 2022-02-17 NOTE — PROCEDURES
Joint Inj - LG: knee, R knee on 2/17/2022 9:16 AM  Indications: pain and joint swelling  Details: (25-gauge 1 inch needle was inserted with injection of 2 cc of 1% lidocaine at the superior lateral aspect of the knee with subsequent aspiration performed with an 18-gauge 1 and half inch needle.) needle, superolateral approach  Medications: (3 cc of lidocaine and 1 cc of Depo-Medrol 80 mg)  Aspirate: 50 mL serous and yellow; sent for lab analysis  Outcome: tolerated well, no immediate complications  Procedure, treatment alternatives, risks and benefits explained, specific risks discussed. Consent was given by the patient. Patient was prepped and draped in the usual sterile fashion.

## 2022-03-22 ENCOUNTER — OFFICE VISIT (OUTPATIENT)
Dept: SLEEP MEDICINE | Facility: MEDICAL CENTER | Age: 62
End: 2022-03-22
Payer: COMMERCIAL

## 2022-03-22 VITALS
HEART RATE: 69 BPM | BODY MASS INDEX: 25.77 KG/M2 | DIASTOLIC BLOOD PRESSURE: 64 MMHG | WEIGHT: 180 LBS | RESPIRATION RATE: 16 BRPM | HEIGHT: 70 IN | OXYGEN SATURATION: 94 % | SYSTOLIC BLOOD PRESSURE: 122 MMHG

## 2022-03-22 DIAGNOSIS — J44.9 CHRONIC OBSTRUCTIVE PULMONARY DISEASE, UNSPECIFIED COPD TYPE (HCC): ICD-10-CM

## 2022-03-22 DIAGNOSIS — J96.11 CHRONIC RESPIRATORY FAILURE WITH HYPOXIA (HCC): ICD-10-CM

## 2022-03-22 DIAGNOSIS — Z87.891 FORMER SMOKER: ICD-10-CM

## 2022-03-22 PROCEDURE — 99214 OFFICE O/P EST MOD 30 MIN: CPT | Performed by: INTERNAL MEDICINE

## 2022-03-22 RX ORDER — TIOTROPIUM BROMIDE AND OLODATEROL 3.124; 2.736 UG/1; UG/1
2 SPRAY, METERED RESPIRATORY (INHALATION) DAILY
Qty: 1 EACH | Refills: 0 | COMMUNITY
Start: 2022-03-22 | End: 2022-06-29 | Stop reason: SDUPTHER

## 2022-03-22 RX ORDER — IPRATROPIUM BROMIDE AND ALBUTEROL SULFATE 2.5; .5 MG/3ML; MG/3ML
SOLUTION RESPIRATORY (INHALATION)
Qty: 360 ML | Refills: 4 | Status: SHIPPED | OUTPATIENT
Start: 2022-03-22 | End: 2022-09-26 | Stop reason: SDUPTHER

## 2022-03-22 RX ORDER — ALBUTEROL SULFATE 90 UG/1
2 AEROSOL, METERED RESPIRATORY (INHALATION) EVERY 4 HOURS PRN
Qty: 1 EACH | Refills: 11 | Status: SHIPPED | OUTPATIENT
Start: 2022-03-22 | End: 2022-05-11 | Stop reason: SDUPTHER

## 2022-03-22 ASSESSMENT — FIBROSIS 4 INDEX: FIB4 SCORE: 1.291238469992825914

## 2022-03-22 ASSESSMENT — ENCOUNTER SYMPTOMS
FEVER: 0
HEMOPTYSIS: 0
COUGH: 0
WHEEZING: 0
FALLS: 0
BACK PAIN: 0
ABDOMINAL PAIN: 0
TREMORS: 0
SPUTUM PRODUCTION: 0
DOUBLE VISION: 0
FOCAL WEAKNESS: 0
DIZZINESS: 0
NAUSEA: 0
PALPITATIONS: 0
SINUS PAIN: 0
CLAUDICATION: 0
HEARTBURN: 0
EYE REDNESS: 0
SPEECH CHANGE: 0
CHILLS: 0
PHOTOPHOBIA: 0
PND: 0
WEIGHT LOSS: 0
DEPRESSION: 0
STRIDOR: 0
SHORTNESS OF BREATH: 1
CONSTIPATION: 0
NECK PAIN: 0
WEAKNESS: 0
HEADACHES: 0
MYALGIAS: 0
DIARRHEA: 0
SORE THROAT: 0
DIAPHORESIS: 0
BLURRED VISION: 0
VOMITING: 0
EYE PAIN: 0
EYE DISCHARGE: 0
ORTHOPNEA: 0

## 2022-03-22 NOTE — NON-PROVIDER
Dispensed Stiolto samples x1.    Lot#: [id number]  Exp: 8/23    Provider: Tiffany Sands MD     Logged distribution of sample on data sheet.     Dispensed by: Dolores Ambriz, Med Ass't

## 2022-03-22 NOTE — PROGRESS NOTES
"Chief Complaint   Patient presents with   • COPD     Last seen 9/21/21          HPI: This patient is a 61 y.o. female whom is followed in our clinic for COPD c/b chronic hypoxic respiratory failure last seen by me on 9/21/21. PMHX is significant for ABY on 2LPM with OPO showing adequate oxygenation, atrial arrhythmia s/p ablation 2015, CAD. She is a former smoker with 35-pack-year history, quit 2013.  Patient has severe airflow obstruction based on pulmonary function testing from August 2021 showing FEV1 of 0.9 L, 30% predicted, hyperinflation, air trapping and reduced DLCO at 63% predicted.  All variables are slightly worse since 2019.  We did test her for alpha-1 antitrypsin and she is MM phenotype.  She has not been able to tolerate inhaled corticosteroids but has done well on Stiolto.  She has not been able to maintain regular controller therapy due to cost relying mainly on samples and short acting bronchodilators via MDI or nebulizer.  CT chest done for lung cancer screening 12/19 showed right lower lobe atelectasis versus pneumonitis as well as some atelectatic changes in the right middle lobe and right upper lobe and lingula.  No discrete nodules or lymphadenopathy.  She has declined surveillance imaging due to cost. She has not been treated for acute exacerbation since September but is using her nebulizer and rescue inhaler more frequently. She works full time at a retail store which increases sxs of SOB although she denies cough, wheezing, mucous production.     Past Medical History:   Diagnosis Date   • Arthritis current    knees, hands, elbows   • ASTHMA 2008    recurring   • Atrial fibrillation (HCC)    • Breath shortness 02/25/2019    uses oxygen at 3 liters/min cont. \"Preferred\" oxygen company   • Bronchitis    • Cancer (HCC) 1980s    Hodgkins lymphoma   • COPD with acute exacerbation (HCC) 2/25/2015   • Coronary artery disease due to lipid rich plaque - moderate RCA FFR negative    • Diabetes (HCC)  "   • Dyslipidemia    • High cholesterol 2019   • Hypertension    • Indigestion current    tx with OTC rolaids   • MI (myocardial infarction) ([facility])    • Oxygen dependent    • PAF (paroxysmal atrial fibrillation) ([facility]) - s/p ablation     • Persistent atrial fibrillation ([facility]) 2016   • Pneumonia    • Pulmonary emphysema ([facility])    • Pulmonary nodule    • Restless leg syndrome    • Sepsis ([facility]) 2015   • Sleep apnea        Social History     Socioeconomic History   • Marital status:      Spouse name: Not on file   • Number of children: Not on file   • Years of education: Not on file   • Highest education level: Not on file   Occupational History   • Not on file   Tobacco Use   • Smoking status: Former Smoker     Packs/day: 1.00     Years: 35.00     Pack years: 35.00     Types: Cigarettes     Quit date: 3/21/2013     Years since quittin.0   • Smokeless tobacco: Never Used   • Tobacco comment: quit 4 days ago, smoked less than 1ppd   Vaping Use   • Vaping Use: Never used   Substance and Sexual Activity   • Alcohol use: No   • Drug use: No     Comment: former cocaine 'lots of it', former meth clean 9 years   • Sexual activity: Not on file   Other Topics Concern   • Not on file   Social History Narrative   • Not on file     Social Determinants of Health     Financial Resource Strain: Not on file   Food Insecurity: Not on file   Transportation Needs: Not on file   Physical Activity: Not on file   Stress: Not on file   Social Connections: Not on file   Intimate Partner Violence: Not on file   Housing Stability: Not on file       Family History   Problem Relation Age of Onset   • Heart Disease Mother    • Other Mother 72        pacemaker   • Lung Disease Father         severe asthma   • Alcohol/Drug Maternal Grandfather    • Lung Disease Paternal Grandmother        Current Outpatient Medications on File Prior to Visit   Medication Sig Dispense Refill   • metformin (GLUCOPHAGE) 1000 MG tablet Take 1  "Tablet by mouth 2 times a day. Indications: Twice a day 200 Tablet 3   • nitroglycerin (NITROSTAT) 0.4 MG SL Tab Place 1 Tablet under the tongue as needed for Chest Pain (every 5 minutes up to 3 doses, if chest pain still present call 911). 25 Tablet 1   • simvastatin (ZOCOR) 20 MG Tab Take 1 Tablet by mouth every evening. 90 Tablet 3   • Clobetasol Propionate 0.025 % Cream Apply 1 Inch topically 1 time a day as needed. 1 Each 2   • atenolol (TENORMIN) 25 MG Tab Take 1 Tablet by mouth every day. 90 Tablet 3   • OXYGEN-HELIUM INH oxygen       No current facility-administered medications on file prior to visit.       Cephalexin, Clindamycin, Codeine, Keflex, Lisinopril, Penicillins, Codeine, and Rosuvastatin      ROS:   Review of Systems   Constitutional: Negative for chills, diaphoresis, fever, malaise/fatigue and weight loss.   HENT: Negative for congestion, ear discharge, ear pain, hearing loss, nosebleeds, sinus pain, sore throat and tinnitus.    Eyes: Negative for blurred vision, double vision, photophobia, pain, discharge and redness.   Respiratory: Positive for shortness of breath. Negative for cough, hemoptysis, sputum production, wheezing and stridor.    Cardiovascular: Negative for chest pain, palpitations, orthopnea, claudication, leg swelling and PND.   Gastrointestinal: Negative for abdominal pain, constipation, diarrhea, heartburn, nausea and vomiting.   Genitourinary: Negative for dysuria and urgency.   Musculoskeletal: Negative for back pain, falls, joint pain, myalgias and neck pain.   Skin: Negative for itching and rash.   Neurological: Negative for dizziness, tremors, speech change, focal weakness, weakness and headaches.   Endo/Heme/Allergies: Negative for environmental allergies.   Psychiatric/Behavioral: Negative for depression.       /64 (BP Location: Right arm, Patient Position: Sitting, BP Cuff Size: Adult)   Pulse 69   Resp 16   Ht 1.778 m (5' 10\")   Wt 81.6 kg (180 lb)   SpO2 94% "   Physical Exam  Vitals reviewed.   Constitutional:       General: She is not in acute distress.     Appearance: Normal appearance. She is well-developed and normal weight.   HENT:      Head: Normocephalic and atraumatic.      Right Ear: External ear normal.      Left Ear: External ear normal.      Nose: Nose normal. No congestion.      Mouth/Throat:      Mouth: Mucous membranes are moist.      Pharynx: Oropharynx is clear. No oropharyngeal exudate.   Eyes:      General: No scleral icterus.     Extraocular Movements: Extraocular movements intact.      Conjunctiva/sclera: Conjunctivae normal.      Pupils: Pupils are equal, round, and reactive to light.   Neck:      Vascular: No JVD.      Trachea: No tracheal deviation.   Cardiovascular:      Rate and Rhythm: Normal rate and regular rhythm.      Heart sounds: Normal heart sounds. No murmur heard.    No friction rub. No gallop.   Pulmonary:      Effort: Pulmonary effort is normal. No accessory muscle usage or respiratory distress.      Breath sounds: Normal breath sounds. No wheezing or rales.   Abdominal:      General: There is no distension.      Palpations: Abdomen is soft.      Tenderness: There is no abdominal tenderness.   Musculoskeletal:         General: No tenderness or deformity. Normal range of motion.      Cervical back: Normal range of motion and neck supple.      Right lower leg: No edema.      Left lower leg: No edema.   Lymphadenopathy:      Cervical: No cervical adenopathy.   Skin:     General: Skin is warm and dry.      Findings: No rash.      Nails: There is no clubbing.   Neurological:      Mental Status: She is alert and oriented to person, place, and time.      Cranial Nerves: No cranial nerve deficit.      Gait: Gait normal.   Psychiatric:         Behavior: Behavior normal.         PFTs as reviewed by me personally: as per HPI    Imaging as reviewed by me personally:  As per hPI    Assessment:  1. Chronic obstructive pulmonary disease,  unspecified COPD type (HCC)  Tiotropium Bromide-Olodaterol (STIOLTO RESPIMAT) 2.5-2.5 MCG/ACT Aero Soln    Referral to Pharmacotherapy Service    ipratropium-albuterol (DUONEB) 0.5-2.5 (3) MG/3ML nebulizer solution    albuterol 108 (90 Base) MCG/ACT Aero Soln inhalation aerosol   2. Chronic respiratory failure with hypoxia (HCC)     3. Former smoker         Plan:  1. Chronic, severe and symptom control has not been good however we have avoided tx for exacerbations and hospitalizations. Functionally MMRC 2. I would like to refer her to our COPD pharmacology program to see if there is a way we can keep her on control therapy at an acceptable cost. In the mean time, she is up to date on vaccines and tobacco free. She is compliant with and benefiting from therapy.   2. 2/2 COPD. Chronic. Pt requires O2 at 2LPM with activity and at rest. Continue oxygen which she is compliant with and benefiting from  3. Tobacco free. Pt has decline referral to lung Ca screening due to cost. Encouraged ongoing abstinence.   Return in about 6 months (around 9/22/2022) for copd.

## 2022-04-11 ENCOUNTER — DOCUMENTATION (OUTPATIENT)
Dept: VASCULAR LAB | Facility: MEDICAL CENTER | Age: 62
End: 2022-04-11

## 2022-04-11 NOTE — PROGRESS NOTES
[facility]    Received COPD referral to assist with inhaler coverage. Spoke with Lc who will not qualify for patient assistance based on income. She did not want to schedule an appointment at this time.     Stevie Dueñas, Maria IsabelD

## 2022-04-15 ENCOUNTER — TELEPHONE (OUTPATIENT)
Dept: MEDICAL GROUP | Facility: OTHER | Age: 62
End: 2022-04-15

## 2022-04-15 DIAGNOSIS — J43.1 PANLOBULAR EMPHYSEMA (HCC): ICD-10-CM

## 2022-04-15 RX ORDER — LEVOFLOXACIN 500 MG/1
500 TABLET, FILM COATED ORAL DAILY
Qty: 5 TABLET | Refills: 0 | Status: SHIPPED
Start: 2022-04-15 | End: 2022-05-23

## 2022-04-15 NOTE — TELEPHONE ENCOUNTER
Phone Number Called: [phone]    Call outcome: Left detailed message for patient. Informed to call back with any additional questions.    Message: I have return the patient phone call back, no answer left a message to call us back. She had called requesting some antibiotics. No answer when I return her phone call.

## 2022-04-15 NOTE — TELEPHONE ENCOUNTER
Caller Name: Lc Burns  Call Back Number: [phone]    How would the patient prefer to be contacted with a response: Phone call OK to leave a detailed message    Patient called requesting an antibiotics, as she has a sore throat and cough, that started today,no fever. very congested.    levoFLOXacin (LEVAQUIN) 500 MG tablet

## 2022-04-16 NOTE — TELEPHONE ENCOUNTER
Based on message provided and my clinical knowledge of this patient along with her frequent COPD exacerbation I elected to provide antibiotic short-term and will attempt to see her next week

## 2022-05-11 DIAGNOSIS — J44.9 CHRONIC OBSTRUCTIVE PULMONARY DISEASE, UNSPECIFIED COPD TYPE (HCC): ICD-10-CM

## 2022-05-11 RX ORDER — ALBUTEROL SULFATE 90 UG/1
2 AEROSOL, METERED RESPIRATORY (INHALATION) EVERY 6 HOURS PRN
COMMUNITY
End: 2022-05-11 | Stop reason: CLARIF

## 2022-05-11 NOTE — TELEPHONE ENCOUNTER
Received request via: Patient    Was the patient seen in the last year in this department? Yes    Does the patient have an active prescription (recently filled or refills available) for medication(s) requested? No         Patient called requesting, also a refill on her Verio one touch test strips testing 1-2 daily.

## 2022-05-12 RX ORDER — ALBUTEROL SULFATE 90 UG/1
2 AEROSOL, METERED RESPIRATORY (INHALATION) EVERY 4 HOURS PRN
Qty: 1 EACH | Refills: 11 | Status: SHIPPED | OUTPATIENT
Start: 2022-05-12 | End: 2022-11-29 | Stop reason: SDUPTHER

## 2022-05-23 ENCOUNTER — OFFICE VISIT (OUTPATIENT)
Dept: MEDICAL GROUP | Facility: OTHER | Age: 62
End: 2022-05-23
Payer: COMMERCIAL

## 2022-05-23 VITALS
BODY MASS INDEX: 26.05 KG/M2 | OXYGEN SATURATION: 93 % | TEMPERATURE: 97.5 F | SYSTOLIC BLOOD PRESSURE: 120 MMHG | WEIGHT: 182 LBS | DIASTOLIC BLOOD PRESSURE: 80 MMHG | HEIGHT: 70 IN | HEART RATE: 67 BPM | RESPIRATION RATE: 15 BRPM

## 2022-05-23 DIAGNOSIS — M70.21 OLECRANON BURSITIS OF RIGHT ELBOW: ICD-10-CM

## 2022-05-23 DIAGNOSIS — E11.9 TYPE 2 DIABETES MELLITUS WITHOUT COMPLICATION, WITHOUT LONG-TERM CURRENT USE OF INSULIN (HCC): ICD-10-CM

## 2022-05-23 DIAGNOSIS — J44.9 CHRONIC OBSTRUCTIVE PULMONARY DISEASE, UNSPECIFIED COPD TYPE (HCC): ICD-10-CM

## 2022-05-23 PROBLEM — M25.461 EFFUSION OF RIGHT KNEE JOINT: Status: RESOLVED | Noted: 2022-02-16 | Resolved: 2022-05-23

## 2022-05-23 PROBLEM — R30.0 DYSURIA: Status: RESOLVED | Noted: 2021-11-09 | Resolved: 2022-05-23

## 2022-05-23 PROCEDURE — 99214 OFFICE O/P EST MOD 30 MIN: CPT | Performed by: FAMILY MEDICINE

## 2022-05-23 RX ORDER — PREDNISONE 50 MG/1
50 TABLET ORAL DAILY
Qty: 5 TABLET | Refills: 0 | Status: SHIPPED
Start: 2022-05-23 | End: 2022-07-06

## 2022-05-23 RX ORDER — ALBUTEROL SULFATE 90 UG/1
2 AEROSOL, METERED RESPIRATORY (INHALATION) EVERY 6 HOURS PRN
Qty: 8.5 G | Refills: 11 | Status: SHIPPED
Start: 2022-05-23 | End: 2022-09-26 | Stop reason: SDUPTHER

## 2022-05-23 ASSESSMENT — FIBROSIS 4 INDEX: FIB4 SCORE: 1.291238469992825914

## 2022-05-23 ASSESSMENT — ENCOUNTER SYMPTOMS: CONSTITUTIONAL NEGATIVE: 1

## 2022-05-23 NOTE — PROGRESS NOTES
Subjective:   Lc Briggs is a 61 y.o. female here for the evaluation and management of Follow-Up (LUMPS ARMS, MEDICATION )    COPD-patient reports inhalers have been less than helpful, she reports increased work of breathing but no significant wheezing    She reports sensitivity on the right upper arm located over the lateral aspect-spontaneous onset over the last month with associated nodularity in the subcutaneous tissue    Diabetes- stable, requests refills on blood glucose monitor and test strips    Olecranon bursitis of the right elbow-previous orthopedic opinion and she thinks it is progressive and asked potentially about aspiration    No problems updated.    Review of Systems   Constitutional: Negative.    Musculoskeletal: Positive for joint pain.       Current Outpatient Medications   Medication Sig Dispense Refill   • albuterol 108 (90 Base) MCG/ACT Aero Soln inhalation aerosol Inhale 2 Puffs every four hours as needed for Shortness of Breath. 1 Each 11   • levoFLOXacin (LEVAQUIN) 500 MG tablet Take 1 Tablet by mouth every day. 5 Tablet 0   • Tiotropium Bromide-Olodaterol (STIOLTO RESPIMAT) 2.5-2.5 MCG/ACT Aero Soln Take 2 Puffs by mouth every day. 1 Each 0   • ipratropium-albuterol (DUONEB) 0.5-2.5 (3) MG/3ML nebulizer solution use 1 vial via nebulizer every 4 hours as needed for shortness of breath 360 mL 4   • metformin (GLUCOPHAGE) 1000 MG tablet Take 1 Tablet by mouth 2 times a day. Indications: Twice a day 200 Tablet 3   • nitroglycerin (NITROSTAT) 0.4 MG SL Tab Place 1 Tablet under the tongue as needed for Chest Pain (every 5 minutes up to 3 doses, if chest pain still present call 911). 25 Tablet 1   • simvastatin (ZOCOR) 20 MG Tab Take 1 Tablet by mouth every evening. 90 Tablet 3   • Clobetasol Propionate 0.025 % Cream Apply 1 Inch topically 1 time a day as needed. 1 Each 2   • atenolol (TENORMIN) 25 MG Tab Take 1 Tablet by mouth every day. 90 Tablet 3   • OXYGEN-HELIUM INH oxygen    "    No current facility-administered medications for this visit.     Allergies  Cephalexin, Clindamycin, Codeine, Keflex, Lisinopril, Penicillins, Codeine, and Rosuvastatin    Past Medical History:   Diagnosis Date   • Arthritis current    knees, hands, elbows   • ASTHMA 2008    recurring   • Atrial fibrillation ([facility])    • Breath shortness 02/25/2019    uses oxygen at 3 liters/min cont. \"Preferred\" oxygen company   • Bronchitis    • Cancer ([facility]) 1980s    Hodgkins lymphoma   • COPD with acute exacerbation ([facility]) 2/25/2015   • Coronary artery disease due to lipid rich plaque - moderate RCA FFR negative    • Diabetes ([facility])    • Dyslipidemia    • High cholesterol 02/25/2019   • Hypertension    • Indigestion current    tx with OTC rolaids   • MI (myocardial infarction) ([facility])    • Oxygen dependent    • PAF (paroxysmal atrial fibrillation) ([facility]) - s/p ablation 2015    • Persistent atrial fibrillation ([facility]) 2/9/2016   • Pneumonia    • Pulmonary emphysema ([facility])    • Pulmonary nodule    • Restless leg syndrome    • Sepsis ([facility]) 2/25/2015   • Sleep apnea      Patient Active Problem List    Diagnosis Date Noted   • Effusion of right knee joint 02/16/2022   • Olecranon bursitis of right elbow 11/09/2021   • Dysuria 11/09/2021   • Presence of drug coated stent in right coronary artery 2019    • PAF (paroxysmal atrial fibrillation) ([facility]) - s/p ablation 2015    • Dyslipidemia    • S/P radiofrequency ablation operation for arrhythmia 03/01/2017   • SVT (supraventricular tachycardia) ([facility]) 04/11/2016   • Anxiety 03/21/2016   • Coronary artery disease due to lipid rich plaque - moderate RCA FFR negative 9/2015    • Panlobular emphysema ([facility]) 02/09/2016   • Overweight 02/09/2016   • Sleep apnea 02/09/2016   • Type 2 diabetes mellitus without complication ([facility]) 02/09/2016       Past Surgical History  Past Surgical History:   Procedure Laterality Date   • CARDIAC CATH, RIGHT/LEFT HEART     • OTHER CARDIAC SURGERY     • WEDGE RESECTION " "LUNG          Objective:     Vitals:    05/23/22 1306   BP: 120/80   BP Location: Left arm   Patient Position: Sitting   BP Cuff Size: Adult   Pulse: 67   Resp: 15   Temp: 36.4 °C (97.5 °F)   TempSrc: Temporal   SpO2: 93%   Weight: 82.6 kg (182 lb)   Height: 1.778 m (5' 10\")     Body mass index is 26.11 kg/m².     Physical Exam  Constitutional:       Appearance: Normal appearance.   HENT:      Head: Normocephalic.   Eyes:      Extraocular Movements: Extraocular movements intact.      Conjunctiva/sclera: Conjunctivae normal.   Cardiovascular:      Rate and Rhythm: Normal rate and regular rhythm.      Heart sounds: Normal heart sounds.   Pulmonary:      Effort: Pulmonary effort is normal.      Breath sounds: Normal breath sounds.   Skin:     General: Skin is warm and dry.   Neurological:      Mental Status: She is alert and oriented to person, place, and time. Mental status is at baseline.   Psychiatric:         Mood and Affect: Mood normal.         Behavior: Behavior normal.       Right arm-normal inspection, some nodularity on palpation of the subcutaneous tissues with associated discomfort, distally she is neurovascularly intact with no obvious edema  Assessment and Plan:   Lc Briggs is a 61 y.o. female with a Follow-Up (LUMPS ARMS, MEDICATION )     The following was discussed with the patient today.    Problem List Items Addressed This Visit    None       Reviewed previous laboratory studies and chest imaging, discussed treatment options for suspected COPD exacerbation and recommended a prednisone burst, she is using successfully in the past and I would also like to see how the right arm responds.  I discussed the differential diagnosis of a possible overuse injury from her occupation versus local irritation.  Medications reviewed and refills provided, laboratory studies recommended with close follow-up in approximate 1 month  Followup: No follow-ups on file.    Gaetano Guardado M.D.    Please note that " this dictation was created using voice recognition software. I have made every reasonable attempt to correct obvious errors, but I expect that there are errors of grammar and possibly content that I did not discover before finalizing the note.

## 2022-06-29 ENCOUNTER — TELEPHONE (OUTPATIENT)
Dept: MEDICAL GROUP | Facility: OTHER | Age: 62
End: 2022-06-29

## 2022-06-29 DIAGNOSIS — J44.9 CHRONIC OBSTRUCTIVE PULMONARY DISEASE, UNSPECIFIED COPD TYPE (HCC): ICD-10-CM

## 2022-06-29 RX ORDER — TIOTROPIUM BROMIDE AND OLODATEROL 3.124; 2.736 UG/1; UG/1
2 SPRAY, METERED RESPIRATORY (INHALATION) DAILY
Qty: 2 EACH | Refills: 0 | COMMUNITY
Start: 2022-06-29 | End: 2023-06-08

## 2022-06-29 NOTE — TELEPHONE ENCOUNTER
FINAL PRIOR AUTHORIZATION STATUS:    1.  Name of Medication & Dose: STIOLTO RESPIMAT     2. Prior Auth Status: Approved through 06/2722-6/27/23     3. Action Taken: Pharmacy Notified: yes Patient Notified: yes

## 2022-06-30 NOTE — TELEPHONE ENCOUNTER
Dispensed Stiolto  samples x2.    Lot#: [id number] 12/22  [id number] 8/23  Exp:     Provider: RAMAKRISHNA Sands MD     Logged distribution of sample on data sheet.     Dispensed by: Ramón Greenberg Ass't    Samples placed at the  for  patient is aware.

## 2022-07-06 ENCOUNTER — OFFICE VISIT (OUTPATIENT)
Dept: MEDICAL GROUP | Facility: OTHER | Age: 62
End: 2022-07-06
Payer: COMMERCIAL

## 2022-07-06 VITALS
WEIGHT: 180 LBS | TEMPERATURE: 98.7 F | BODY MASS INDEX: 24.38 KG/M2 | HEART RATE: 79 BPM | OXYGEN SATURATION: 94 % | HEIGHT: 72 IN | SYSTOLIC BLOOD PRESSURE: 116 MMHG | DIASTOLIC BLOOD PRESSURE: 58 MMHG

## 2022-07-06 DIAGNOSIS — J44.1 COPD WITH ACUTE EXACERBATION (HCC): ICD-10-CM

## 2022-07-06 DIAGNOSIS — J96.11 CHRONIC HYPOXEMIC RESPIRATORY FAILURE (HCC): ICD-10-CM

## 2022-07-06 DIAGNOSIS — L57.0 ACTINIC KERATOSIS: ICD-10-CM

## 2022-07-06 PROCEDURE — 99214 OFFICE O/P EST MOD 30 MIN: CPT | Performed by: FAMILY MEDICINE

## 2022-07-06 RX ORDER — AZITHROMYCIN 250 MG/1
TABLET, FILM COATED ORAL
Qty: 6 TABLET | Refills: 1 | Status: SHIPPED
Start: 2022-07-06 | End: 2022-07-11

## 2022-07-06 RX ORDER — BLOOD SUGAR DIAGNOSTIC
STRIP MISCELLANEOUS
COMMUNITY
Start: 2022-05-23

## 2022-07-06 RX ORDER — PREDNISONE 20 MG/1
20 TABLET ORAL 2 TIMES DAILY
Qty: 14 TABLET | Refills: 1 | Status: SHIPPED
Start: 2022-07-06 | End: 2022-07-11

## 2022-07-06 ASSESSMENT — FIBROSIS 4 INDEX: FIB4 SCORE: 1.291238469992825914

## 2022-07-06 ASSESSMENT — PATIENT HEALTH QUESTIONNAIRE - PHQ9: CLINICAL INTERPRETATION OF PHQ2 SCORE: 0

## 2022-07-06 NOTE — PATIENT INSTRUCTIONS
Get Chest X-ray if feeling worse.     Azithromycin for 5 days.     Prednisone for 7 days.     Recheck in 1-2 weeks.

## 2022-07-06 NOTE — ASSESSMENT & PLAN NOTE
I think an antibiotic and steroids may help her.    Prednisone 20 mg bid for 7 day course prescribed.   Azithromycin for 5 day course.   CXR, 2 view, ordered to check for pneumonia if she worsens.   Has regular Pulmonary Medicine follow-up if needed.

## 2022-07-06 NOTE — PROGRESS NOTES
[facility]     PATIENT ID:  NAME:  Lc Briggs  MRN:               [id number]  YOB: 1960    Date: 4:13 PM      CC:  Breathing problems.       HPI: Lc Briggs is a 61 y.o. female who presented with increased difficulty breathing today.     Problem   Chronic Hypoxemic Respiratory Failure (Hcc)    She is on Oxygen day and night.  Uses Oxygen 4-5L/min on demand during the day and uses Oxygen 2.5L/min via concentrator at night.  Has bad COPD.      Copd With Acute Exacerbation (Hcc)    She has been struggling to breathe for a few days and coughing up green sputum and more dyspneic.  She has long standing COPD and is on Oxygen therapy.  She uses Oxygen on demand at 4-5L/minute.  Use 2.5L/min at home at night on her Oxygen concentrator.         REVIEW OF SYSTEMS:   Ten systems reviewed and were negative except as noted in the HPI.                PROBLEM LIST  Patient Active Problem List   Diagnosis   • COPD with acute exacerbation (HCC)   • Panlobular emphysema ([facility])   • Overweight   • Sleep apnea   • Type 2 diabetes mellitus without complication ([facility])   • Anxiety   • Coronary artery disease due to lipid rich plaque - moderate RCA FFR negative 9/2015   • SVT (supraventricular tachycardia) ([facility])   • S/P radiofrequency ablation operation for arrhythmia   • PAF (paroxysmal atrial fibrillation) ([facility]) - s/p ablation 2015   • Dyslipidemia   • Presence of drug coated stent in right coronary artery 2019   • Olecranon bursitis of right elbow   • Chronic obstructive pulmonary disease (HCC)   • Chronic hypoxemic respiratory failure (HCC)        PAST SURGICAL HISTORY:  Past Surgical History:   Procedure Laterality Date   • CARDIAC CATH, RIGHT/LEFT HEART     • OTHER CARDIAC SURGERY     • WEDGE RESECTION LUNG         FAMILY HISTORY:  Family History   Problem Relation Age of Onset   • Heart Disease Mother    • Other Mother 72        pacemaker   • Lung Disease Father         severe asthma  "  • Alcohol/Drug Maternal Grandfather    • Lung Disease Paternal Grandmother        SOCIAL HISTORY:   Social History     Tobacco Use   • Smoking status: Former Smoker     Packs/day: 1.00     Years: 35.00     Pack years: 35.00     Types: Cigarettes     Quit date: 3/21/2013     Years since quittin.2   • Smokeless tobacco: Never Used   • Tobacco comment: quit 4 days ago, smoked less than 1ppd   Substance Use Topics   • Alcohol use: No       ALLERGIES:  Allergies   Allergen Reactions   • Cephalexin Anaphylaxis and Shortness of Breath     Rxn date: .  Other reaction(s): SHORTNESS OF BREATH   • Clindamycin Anaphylaxis     Rxn date: .   • Codeine Rash   • Keflex Anaphylaxis   • Lisinopril Palpitations     Other reaction(s): PALPITATIONS   • Penicillins Hives, Rash and Itching     Rxn date: \"As a child.\"   • Codeine Hives, Rash and Itching     Rxn date: \"As a child.\"   • Rosuvastatin Myalgia       OUTPATIENT MEDICATIONS:    Current Outpatient Medications:   •  ONETOUCH VERIO strip, , Disp: , Rfl:   •  predniSONE (DELTASONE) 20 MG Tab, Take 1 Tablet by mouth 2 times a day., Disp: 14 Tablet, Rfl: 1  •  azithromycin (ZITHROMAX) 250 MG Tab, 2 tabs by mouth once on first day and then one daily for four more days., Disp: 6 Tablet, Rfl: 1  •  Tiotropium Bromide-Olodaterol (STIOLTO RESPIMAT) 2.5-2.5 MCG/ACT Aero Soln, Take 2 Puffs by mouth every day., Disp: 2 Each, Rfl: 0  •  Blood Glucose Test Strips, Test strips order: Test strips for One Touch Verio meter. Sig: use BID and prn ssx high or low sugar #100 RF x 3, Disp: 100 Strip, Rfl: 11  •  Blood Glucose Monitoring Suppl Device, Meter: Dispense Device of Insurance Preference. Sig. Use as directed for blood sugar monitoring. #1. NR., Disp: 1 Each, Rfl: 1  •  Blood Glucose Test Strips, Test strips order: Test strips for generic meter. Sig: use BID and prn ssx high or low sugar #100 RF x 3, Disp: 100 Strip, Rfl: 11  •  VENTOLIN  (90 Base) MCG/ACT Aero Soln " inhalation aerosol, Inhale 2 Puffs every 6 hours as needed for Shortness of Breath., Disp: 8.5 g, Rfl: 11  •  albuterol 108 (90 Base) MCG/ACT Aero Soln inhalation aerosol, Inhale 2 Puffs every four hours as needed for Shortness of Breath., Disp: 1 Each, Rfl: 11  •  ipratropium-albuterol (DUONEB) 0.5-2.5 (3) MG/3ML nebulizer solution, use 1 vial via nebulizer every 4 hours as needed for shortness of breath, Disp: 360 mL, Rfl: 4  •  metformin (GLUCOPHAGE) 1000 MG tablet, Take 1 Tablet by mouth 2 times a day. Indications: Twice a day, Disp: 200 Tablet, Rfl: 3  •  nitroglycerin (NITROSTAT) 0.4 MG SL Tab, Place 1 Tablet under the tongue as needed for Chest Pain (every 5 minutes up to 3 doses, if chest pain still present call 911)., Disp: 25 Tablet, Rfl: 1  •  simvastatin (ZOCOR) 20 MG Tab, Take 1 Tablet by mouth every evening., Disp: 90 Tablet, Rfl: 3  •  Clobetasol Propionate 0.025 % Cream, Apply 1 Inch topically 1 time a day as needed., Disp: 1 Each, Rfl: 2  •  atenolol (TENORMIN) 25 MG Tab, Take 1 Tablet by mouth every day., Disp: 90 Tablet, Rfl: 3  •  OXYGEN-HELIUM INH, oxygen, Disp: , Rfl:     PHYSICAL EXAM:  Vitals:    07/06/22 1529   BP: 116/58   BP Location: Left arm   Patient Position: Sitting   Pulse: 79   Temp: 37.1 °C (98.7 °F)   SpO2: 94%   Weight: 81.6 kg (180 lb)   Height: 1.829 m (6')       General: Pt resting in NAD, cooperative   Skin:  Pink, warm and dry.  HEENT: NC/AT. EOMI.  Lungs:  Symmetrical.  CTAB, good air movement   Cardiovascular:  S1/S2 RRR   Abdomen:  Abdomen is soft, nontender  Extremities:  Full range of motion.  CNS:  Muscle tone is normal. No gross focal neurologic deficits      ASSESSMENT/PLAN:   61 y.o. female     Problem List Items Addressed This Visit     Chronic hypoxemic respiratory failure (HCC)     Continue using Oxygen as prescribed.            COPD with acute exacerbation (HCC)     I think an antibiotic and steroids may help her.    Prednisone 20 mg bid for 7 day course  prescribed.   Azithromycin for 5 day course.   CXR, 2 view, ordered to check for pneumonia if she worsens.   Has regular Pulmonary Medicine follow-up if needed.            Relevant Medications    predniSONE (DELTASONE) 20 MG Tab    azithromycin (ZITHROMAX) 250 MG Tab    Other Relevant Orders    DX-CHEST-2 VIEWS          Dewayne Benton MD  [facility]

## 2022-07-07 ENCOUNTER — TELEPHONE (OUTPATIENT)
Dept: SLEEP MEDICINE | Facility: MEDICAL CENTER | Age: 62
End: 2022-07-07

## 2022-07-07 NOTE — TELEPHONE ENCOUNTER
MEDICATION PRIOR AUTHORIZATION NEEDED:    1. Name of Medication: Stiolto Respimat 2.5-2.5 mcg    2. Requested By (Name of Pharmacy): [facility]     3. Is insurance on file current? yes    4. What is the name & phone number of the 3rd party payor? Jim Beauchamp

## 2022-07-11 ENCOUNTER — OFFICE VISIT (OUTPATIENT)
Dept: MEDICAL GROUP | Facility: OTHER | Age: 62
End: 2022-07-11
Payer: COMMERCIAL

## 2022-07-11 ENCOUNTER — APPOINTMENT (OUTPATIENT)
Dept: RADIOLOGY | Facility: OTHER | Age: 62
End: 2022-07-11
Attending: FAMILY MEDICINE
Payer: COMMERCIAL

## 2022-07-11 VITALS
HEART RATE: 77 BPM | DIASTOLIC BLOOD PRESSURE: 60 MMHG | RESPIRATION RATE: 16 BRPM | WEIGHT: 182 LBS | HEIGHT: 72 IN | SYSTOLIC BLOOD PRESSURE: 118 MMHG | TEMPERATURE: 97.8 F | OXYGEN SATURATION: 90 % | BODY MASS INDEX: 24.65 KG/M2

## 2022-07-11 DIAGNOSIS — J44.1 COPD WITH ACUTE EXACERBATION (HCC): ICD-10-CM

## 2022-07-11 PROCEDURE — 99214 OFFICE O/P EST MOD 30 MIN: CPT | Performed by: FAMILY MEDICINE

## 2022-07-11 PROCEDURE — 71046 X-RAY EXAM CHEST 2 VIEWS: CPT | Mod: TC,FY | Performed by: FAMILY MEDICINE

## 2022-07-11 RX ORDER — PREDNISONE 50 MG/1
50 TABLET ORAL DAILY
Qty: 10 TABLET | Refills: 0 | Status: SHIPPED
Start: 2022-07-11 | End: 2022-07-25

## 2022-07-11 RX ORDER — LEVOFLOXACIN 500 MG/1
500 TABLET, FILM COATED ORAL DAILY
Qty: 5 TABLET | Refills: 0 | Status: SHIPPED
Start: 2022-07-11 | End: 2022-07-25

## 2022-07-11 RX ORDER — PREDNISONE 50 MG/1
50 TABLET ORAL DAILY
Qty: 10 TABLET | Refills: 0 | Status: SHIPPED | OUTPATIENT
Start: 2022-07-11 | End: 2022-07-11

## 2022-07-11 ASSESSMENT — ENCOUNTER SYMPTOMS
SHORTNESS OF BREATH: 1
CONSTITUTIONAL NEGATIVE: 1

## 2022-07-11 ASSESSMENT — FIBROSIS 4 INDEX: FIB4 SCORE: 0.97

## 2022-07-11 NOTE — PROGRESS NOTES
Subjective:   Lc Briggs is a 61 y.o. female here for the evaluation and management of Follow-Up (Pt states that she is here for f/u on her breathing. )    COPD exacerbation-patient reports limited improvement with previous low-dose corticosteroids and antibiotics, still very short of breath without significant sputum production, on oxygen chronically but still reports decreased oxygenation with exertion  No problems updated.    Review of Systems   Constitutional: Negative.    Respiratory: Positive for shortness of breath.        Current Outpatient Medications   Medication Sig Dispense Refill   • levoFLOXacin (LEVAQUIN) 500 MG tablet Take 1 Tablet by mouth every day. 5 Tablet 0   • predniSONE (DELTASONE) 50 MG Tab Take 1 Tablet by mouth every day. 10 Tablet 0   • ONETOUCH VERIO strip      • Tiotropium Bromide-Olodaterol (STIOLTO RESPIMAT) 2.5-2.5 MCG/ACT Aero Soln Take 2 Puffs by mouth every day. 2 Each 0   • Blood Glucose Test Strips Test strips order: Test strips for One Touch Verio meter. Sig: use BID and prn ssx high or low sugar #100 RF x 3 100 Strip 11   • Blood Glucose Monitoring Suppl Device Meter: Dispense Device of Insurance Preference. Sig. Use as directed for blood sugar monitoring. #1. NR. 1 Each 1   • VENTOLIN  (90 Base) MCG/ACT Aero Soln inhalation aerosol Inhale 2 Puffs every 6 hours as needed for Shortness of Breath. 8.5 g 11   • albuterol 108 (90 Base) MCG/ACT Aero Soln inhalation aerosol Inhale 2 Puffs every four hours as needed for Shortness of Breath. 1 Each 11   • ipratropium-albuterol (DUONEB) 0.5-2.5 (3) MG/3ML nebulizer solution use 1 vial via nebulizer every 4 hours as needed for shortness of breath 360 mL 4   • metformin (GLUCOPHAGE) 1000 MG tablet Take 1 Tablet by mouth 2 times a day. Indications: Twice a day 200 Tablet 3   • nitroglycerin (NITROSTAT) 0.4 MG SL Tab Place 1 Tablet under the tongue as needed for Chest Pain (every 5 minutes up to 3 doses, if chest pain  "still present call 911). 25 Tablet 1   • simvastatin (ZOCOR) 20 MG Tab Take 1 Tablet by mouth every evening. 90 Tablet 3   • Clobetasol Propionate 0.025 % Cream Apply 1 Inch topically 1 time a day as needed. 1 Each 2   • atenolol (TENORMIN) 25 MG Tab Take 1 Tablet by mouth every day. 90 Tablet 3   • OXYGEN-HELIUM INH oxygen       No current facility-administered medications for this visit.     Allergies  Cephalexin, Clindamycin, Codeine, Keflex, Lisinopril, Penicillins, Codeine, and Rosuvastatin    Past Medical History:   Diagnosis Date   • Arthritis current    knees, hands, elbows   • ASTHMA 2008    recurring   • Atrial fibrillation ([facility])    • Breath shortness 02/25/2019    uses oxygen at 3 liters/min cont. \"Preferred\" oxygen company   • Bronchitis    • Cancer ([facility]) 1980s    Hodgkins lymphoma   • COPD with acute exacerbation ([facility]) 2/25/2015   • Coronary artery disease due to lipid rich plaque - moderate RCA FFR negative    • Diabetes ([facility])    • Dyslipidemia    • High cholesterol 02/25/2019   • Hypertension    • Indigestion current    tx with OTC rolaids   • MI (myocardial infarction) ([facility])    • Oxygen dependent    • PAF (paroxysmal atrial fibrillation) ([facility]) - s/p ablation 2015    • Persistent atrial fibrillation ([facility]) 2/9/2016   • Pneumonia    • Pulmonary emphysema ([facility])    • Pulmonary nodule    • Restless leg syndrome    • Sepsis ([facility]) 2/25/2015   • Sleep apnea      Patient Active Problem List    Diagnosis Date Noted   • Chronic hypoxemic respiratory failure ([facility]) 07/06/2022   • Chronic obstructive pulmonary disease ([facility]) 05/23/2022   • Olecranon bursitis of right elbow 11/09/2021   • Presence of drug coated stent in right coronary artery 2019    • PAF (paroxysmal atrial fibrillation) ([facility]) - s/p ablation 2015    • Dyslipidemia    • S/P radiofrequency ablation operation for arrhythmia 03/01/2017   • SVT (supraventricular tachycardia) ([facility]) 04/11/2016   • Anxiety 03/21/2016   • Coronary artery disease due to lipid " rich plaque - moderate RCA FFR negative 9/2015    • Panlobular emphysema (HCC) 02/09/2016   • Overweight 02/09/2016   • Sleep apnea 02/09/2016   • Type 2 diabetes mellitus without complication (HCC) 02/09/2016   • COPD with acute exacerbation (HCC) 02/25/2015       Past Surgical History  Past Surgical History:   Procedure Laterality Date   • CARDIAC CATH, RIGHT/LEFT HEART     • OTHER CARDIAC SURGERY     • WEDGE RESECTION LUNG          Objective:     Vitals:    07/11/22 0928   BP: 118/60   BP Location: Right arm   Patient Position: Sitting   BP Cuff Size: Adult   Pulse: 77   Resp: 16   Temp: 36.6 °C (97.8 °F)   TempSrc: Temporal   SpO2: 90%   Weight: 82.6 kg (182 lb)   Height: 1.829 m (6')     Body mass index is 24.68 kg/m².     Physical Exam  Constitutional:       Appearance: Normal appearance.   HENT:      Head: Normocephalic.   Eyes:      Extraocular Movements: Extraocular movements intact.      Conjunctiva/sclera: Conjunctivae normal.   Cardiovascular:      Rate and Rhythm: Normal rate and regular rhythm.      Heart sounds: Normal heart sounds.   Pulmonary:      Effort: Pulmonary effort is normal.      Breath sounds: Normal breath sounds.   Skin:     General: Skin is warm and dry.   Neurological:      Mental Status: She is alert and oriented to person, place, and time. Mental status is at baseline.   Psychiatric:         Mood and Affect: Mood normal.         Behavior: Behavior normal.       Respiratory exam demonstrated clear lung fields but limited air movement  Assessment and Plan:   Lc Briggs is a 61 y.o. female with a Follow-Up (Pt states that she is here for f/u on her breathing. )     The following was discussed with the patient today.    Problem List Items Addressed This Visit     COPD with acute exacerbation (HCC)    Relevant Medications    levoFLOXacin (LEVAQUIN) 500 MG tablet    predniSONE (DELTASONE) 50 MG Tab        Reviewed and discussed recent chest x-ray with benign findings, I  reviewed the imaging with the patient, discussed treatment options including lack of response to prednisone 20 mg and azithromycin, patient is previously done well with higher dose prednisone we elected to increase her dosage at 50 mg daily for 5 days and then going down to 25 mg for an additional 5 days, I am doubtful she will need an additional antibiotic but took the liberty of providing levofloxacin if she should fail to improve.  We discussed previous pulmonary evaluation and consideration for reassessment with Dr. Sands.  Followup: No follow-ups on file.    Gaetano Guardado M.D.    Please note that this dictation was created using voice recognition software. I have made every reasonable attempt to correct obvious errors, but I expect that there are errors of grammar and possibly content that I did not discover before finalizing the note.

## 2022-07-25 ENCOUNTER — OFFICE VISIT (OUTPATIENT)
Dept: MEDICAL GROUP | Facility: OTHER | Age: 62
End: 2022-07-25
Payer: COMMERCIAL

## 2022-07-25 VITALS
HEIGHT: 70 IN | SYSTOLIC BLOOD PRESSURE: 114 MMHG | BODY MASS INDEX: 26.05 KG/M2 | HEART RATE: 61 BPM | TEMPERATURE: 97.5 F | DIASTOLIC BLOOD PRESSURE: 60 MMHG | OXYGEN SATURATION: 97 % | RESPIRATION RATE: 16 BRPM | WEIGHT: 182 LBS

## 2022-07-25 DIAGNOSIS — E11.9 TYPE 2 DIABETES MELLITUS WITHOUT COMPLICATION, WITHOUT LONG-TERM CURRENT USE OF INSULIN (HCC): ICD-10-CM

## 2022-07-25 DIAGNOSIS — M70.21 OLECRANON BURSITIS OF RIGHT ELBOW: ICD-10-CM

## 2022-07-25 PROCEDURE — 99213 OFFICE O/P EST LOW 20 MIN: CPT | Performed by: FAMILY MEDICINE

## 2022-07-25 ASSESSMENT — FIBROSIS 4 INDEX: FIB4 SCORE: 0.97

## 2022-07-26 ASSESSMENT — ENCOUNTER SYMPTOMS
PSYCHIATRIC NEGATIVE: 1
CONSTITUTIONAL NEGATIVE: 1

## 2022-07-26 NOTE — PROGRESS NOTES
Subjective:   Lc Briggs is a 61 y.o. female here for the evaluation and management of Follow-Up (Pt is here for med refill and f/u)    Hyperlipidemia-reviewed and discussed recent labs with demonstrated elevation in cholesterol numbers, confirmed statin medication recommended    Olecranon bursitis of right elbow-stable, fairly firm, previous orthopedic evaluation with offer for surgical removal    Diabetes-relatively stable with current medications    COPD-patient reports ongoing challenges with shortness of breath with exertion, home oxygen in place, previous pulmonary evaluation  No problems updated.    Review of Systems   Constitutional: Negative.    Psychiatric/Behavioral: Negative.        Current Outpatient Medications   Medication Sig Dispense Refill   • ONETOUCH VERIO strip      • Tiotropium Bromide-Olodaterol (STIOLTO RESPIMAT) 2.5-2.5 MCG/ACT Aero Soln Take 2 Puffs by mouth every day. 2 Each 0   • Blood Glucose Test Strips Test strips order: Test strips for One Touch Verio meter. Sig: use BID and prn ssx high or low sugar #100 RF x 3 100 Strip 11   • Blood Glucose Monitoring Suppl Device Meter: Dispense Device of Insurance Preference. Sig. Use as directed for blood sugar monitoring. #1. NR. 1 Each 1   • VENTOLIN  (90 Base) MCG/ACT Aero Soln inhalation aerosol Inhale 2 Puffs every 6 hours as needed for Shortness of Breath. 8.5 g 11   • albuterol 108 (90 Base) MCG/ACT Aero Soln inhalation aerosol Inhale 2 Puffs every four hours as needed for Shortness of Breath. 1 Each 11   • ipratropium-albuterol (DUONEB) 0.5-2.5 (3) MG/3ML nebulizer solution use 1 vial via nebulizer every 4 hours as needed for shortness of breath 360 mL 4   • metformin (GLUCOPHAGE) 1000 MG tablet Take 1 Tablet by mouth 2 times a day. Indications: Twice a day 200 Tablet 3   • nitroglycerin (NITROSTAT) 0.4 MG SL Tab Place 1 Tablet under the tongue as needed for Chest Pain (every 5 minutes up to 3 doses, if chest pain  "still present call 911). 25 Tablet 1   • simvastatin (ZOCOR) 20 MG Tab Take 1 Tablet by mouth every evening. 90 Tablet 3   • Clobetasol Propionate 0.025 % Cream Apply 1 Inch topically 1 time a day as needed. 1 Each 2   • atenolol (TENORMIN) 25 MG Tab Take 1 Tablet by mouth every day. 90 Tablet 3   • OXYGEN-HELIUM INH oxygen       No current facility-administered medications for this visit.     Allergies  Cephalexin, Clindamycin, Codeine, Keflex, Lisinopril, Penicillins, Codeine, and Rosuvastatin    Past Medical History:   Diagnosis Date   • Arthritis current    knees, hands, elbows   • ASTHMA 2008    recurring   • Atrial fibrillation ([facility])    • Breath shortness 02/25/2019    uses oxygen at 3 liters/min cont. \"Preferred\" oxygen company   • Bronchitis    • Cancer ([facility]) 1980s    Hodgkins lymphoma   • COPD with acute exacerbation ([facility]) 2/25/2015   • Coronary artery disease due to lipid rich plaque - moderate RCA FFR negative    • Diabetes ([facility])    • Dyslipidemia    • High cholesterol 02/25/2019   • Hypertension    • Indigestion current    tx with OTC rolaids   • MI (myocardial infarction) ([facility])    • Oxygen dependent    • PAF (paroxysmal atrial fibrillation) ([facility]) - s/p ablation 2015    • Persistent atrial fibrillation ([facility]) 2/9/2016   • Pneumonia    • Pulmonary emphysema ([facility])    • Pulmonary nodule    • Restless leg syndrome    • Sepsis ([facility]) 2/25/2015   • Sleep apnea      Patient Active Problem List    Diagnosis Date Noted   • Chronic hypoxemic respiratory failure ([facility]) 07/06/2022   • Chronic obstructive pulmonary disease ([facility]) 05/23/2022   • Olecranon bursitis of right elbow 11/09/2021   • Presence of drug coated stent in right coronary artery 2019    • PAF (paroxysmal atrial fibrillation) ([facility]) - s/p ablation 2015    • Dyslipidemia    • S/P radiofrequency ablation operation for arrhythmia 03/01/2017   • SVT (supraventricular tachycardia) ([facility]) 04/11/2016   • Anxiety 03/21/2016   • Coronary artery disease due to lipid " "rich plaque - moderate RCA FFR negative 9/2015    • Panlobular emphysema (HCC) 02/09/2016   • Overweight 02/09/2016   • Sleep apnea 02/09/2016   • Type 2 diabetes mellitus without complication (HCC) 02/09/2016   • COPD with acute exacerbation (HCC) 02/25/2015       Past Surgical History  Past Surgical History:   Procedure Laterality Date   • CARDIAC CATH, RIGHT/LEFT HEART     • OTHER CARDIAC SURGERY     • WEDGE RESECTION LUNG          Objective:     Vitals:    07/25/22 1119   BP: 114/60   BP Location: Right arm   Patient Position: Sitting   BP Cuff Size: Adult   Pulse: 61   Resp: 16   Temp: 36.4 °C (97.5 °F)   TempSrc: Temporal   SpO2: 97%   Weight: 82.6 kg (182 lb)   Height: 1.778 m (5' 10\")     Body mass index is 26.11 kg/m².     Physical Exam  Constitutional:       Appearance: Normal appearance.   HENT:      Head: Normocephalic.   Eyes:      Extraocular Movements: Extraocular movements intact.      Conjunctiva/sclera: Conjunctivae normal.   Cardiovascular:      Rate and Rhythm: Normal rate and regular rhythm.      Heart sounds: Normal heart sounds.   Pulmonary:      Effort: Pulmonary effort is normal.      Breath sounds: Normal breath sounds.   Skin:     General: Skin is warm and dry.   Neurological:      Mental Status: She is alert and oriented to person, place, and time. Mental status is at baseline.   Psychiatric:         Mood and Affect: Mood normal.         Behavior: Behavior normal.         Assessment and Plan:   Lc Briggs is a 61 y.o. female with a Follow-Up (Pt is here for med refill and f/u)     The following was discussed with the patient today.    Problem List Items Addressed This Visit    None       Medications reviewed, laboratory studies reviewed and discussed, I support ongoing treatment plan for follow-up with pulmonary as planned.  Completed [place] paperwork for her employer so that she can sit when needed given her COPD  Followup: No follow-ups on file.    Gaetano Guardado, " M.D.    Please note that this dictation was created using voice recognition software. I have made every reasonable attempt to correct obvious errors, but I expect that there are errors of grammar and possibly content that I did not discover before finalizing the note.

## 2022-09-02 ENCOUNTER — TELEPHONE (OUTPATIENT)
Dept: SCHEDULING | Facility: IMAGING CENTER | Age: 62
End: 2022-09-02

## 2022-09-26 ENCOUNTER — OFFICE VISIT (OUTPATIENT)
Dept: SLEEP MEDICINE | Facility: MEDICAL CENTER | Age: 62
End: 2022-09-26
Payer: COMMERCIAL

## 2022-09-26 VITALS
BODY MASS INDEX: 26.05 KG/M2 | WEIGHT: 182 LBS | OXYGEN SATURATION: 98 % | DIASTOLIC BLOOD PRESSURE: 76 MMHG | HEART RATE: 70 BPM | SYSTOLIC BLOOD PRESSURE: 122 MMHG | RESPIRATION RATE: 16 BRPM | HEIGHT: 70 IN

## 2022-09-26 DIAGNOSIS — J44.9 CHRONIC OBSTRUCTIVE PULMONARY DISEASE, UNSPECIFIED COPD TYPE (HCC): ICD-10-CM

## 2022-09-26 DIAGNOSIS — J96.11 CHRONIC RESPIRATORY FAILURE WITH HYPOXIA (HCC): ICD-10-CM

## 2022-09-26 DIAGNOSIS — Z87.891 FORMER SMOKER: ICD-10-CM

## 2022-09-26 PROCEDURE — 99214 OFFICE O/P EST MOD 30 MIN: CPT | Performed by: INTERNAL MEDICINE

## 2022-09-26 PROCEDURE — 94761 N-INVAS EAR/PLS OXIMETRY MLT: CPT | Performed by: INTERNAL MEDICINE

## 2022-09-26 RX ORDER — CLOBETASOL PROPIONATE 0.5 MG/G
CREAM TOPICAL
COMMUNITY
Start: 2022-09-04 | End: 2022-10-25

## 2022-09-26 RX ORDER — TIOTROPIUM BROMIDE AND OLODATEROL 3.124; 2.736 UG/1; UG/1
2 SPRAY, METERED RESPIRATORY (INHALATION) DAILY
Qty: 2 EACH | Refills: 0 | COMMUNITY
Start: 2022-09-26 | End: 2022-10-25

## 2022-09-26 RX ORDER — IPRATROPIUM BROMIDE AND ALBUTEROL SULFATE 2.5; .5 MG/3ML; MG/3ML
SOLUTION RESPIRATORY (INHALATION)
Qty: 360 ML | Refills: 4 | Status: SHIPPED | OUTPATIENT
Start: 2022-09-26 | End: 2022-11-29 | Stop reason: SDUPTHER

## 2022-09-26 RX ORDER — ALBUTEROL SULFATE 90 UG/1
2 AEROSOL, METERED RESPIRATORY (INHALATION) EVERY 6 HOURS PRN
Qty: 8.5 G | Refills: 11 | Status: SHIPPED | OUTPATIENT
Start: 2022-09-26 | End: 2022-10-25

## 2022-09-26 ASSESSMENT — ENCOUNTER SYMPTOMS
SORE THROAT: 0
EYE REDNESS: 0
PALPITATIONS: 0
HEADACHES: 0
TREMORS: 0
CLAUDICATION: 0
WEIGHT LOSS: 0
NECK PAIN: 0
BLURRED VISION: 0
PND: 0
PHOTOPHOBIA: 0
FEVER: 0
VOMITING: 0
SHORTNESS OF BREATH: 0
DOUBLE VISION: 0
COUGH: 0
CHILLS: 0
ABDOMINAL PAIN: 0
NAUSEA: 0
CONSTIPATION: 0
DIZZINESS: 0
SPUTUM PRODUCTION: 0
SPEECH CHANGE: 0
WHEEZING: 0
EYE DISCHARGE: 0
DEPRESSION: 0
DIAPHORESIS: 0
SINUS PAIN: 0
HEMOPTYSIS: 0
BACK PAIN: 0
MYALGIAS: 0
FOCAL WEAKNESS: 0
EYE PAIN: 0
FALLS: 0
DIARRHEA: 0
WEAKNESS: 0
HEARTBURN: 0
STRIDOR: 0
ORTHOPNEA: 0

## 2022-09-26 ASSESSMENT — FIBROSIS 4 INDEX: FIB4 SCORE: 0.97

## 2022-09-26 NOTE — PROCEDURES
Multi-Ox Readings  Multi Ox #1 Room air   O2 sat % at rest 94   O2 sat % on exertion 88   O2 sat average on exertion     Multi Ox #2 2 LPM   O2 sat % at rest 97   O2 sat % on exertion 94   O2 sat average on exertion       Oxygen Use 2   Oxygen Frequency 24/7   Duration of need     Is the patient mobile within the home?     CPAP Use?     BIPAP Use?     Servo Titration

## 2022-09-26 NOTE — LETTER
September 26, 2022        Lc Briggs    To Whom It May Concern:    Lc Briggs is a patient of mine in the pulmonary medicine clinic with [facility], last seen on 9/26/22. She is treated for a chronic respiratory condition that does require that she use supplemental oxygen with activity as needed and can be complicated by acute flairs requiring increased use of medications and oxygen. I appreciate any accommodations that may be necessary at work for her respiratory health.               Tiffany Sands M.D.

## 2022-09-26 NOTE — PROGRESS NOTES
"Chief Complaint   Patient presents with    COPD     Last seen 3/22/22          HPI: This patient is a 61 y.o. female whom is followed in our clinic for COPD c/b chronic hypoxic respiratory failure last seen by me on 3/22/22.  PMHX is significant for ABY on 2LPM with OPO showing adequate oxygenation, atrial arrhythmia s/p ablation 2015, CAD. She is a former smoker with 35-pack-year history, quit 2013.  Patient has severe airflow obstruction based on pulmonary function testing from August 2021 showing FEV1 of 0.9 L, 30% predicted, hyperinflation, air trapping and reduced DLCO at 63% predicted.  All variables are slightly worse since 2019.  We did test her for alpha-1 antitrypsin and she is MM phenotype.  She has not been able to tolerate inhaled corticosteroids but has done well on Stiolto.  She has not been able to maintain regular controller therapy due to cost relying mainly on samples and short acting bronchodilators via MDI or nebulizer.  CT chest done for lung cancer screening 12/19 showed right lower lobe atelectasis versus pneumonitis as well as some atelectatic changes in the right middle lobe and right upper lobe and lingula.  She presents today for routine f/u. She did not qualify for assistance for pharmacologic tx for COPD.  She has been using her nebulizer at minimum 2x/day but up to 4x daily due to increase in sxs from envrionmental smoke. She was tx once in the past year with oral steroids for AECOPD. Functionally she is MMRC group 2. She uses supplemental O2 at 2LPM with activity and at night and continues to work.     Past Medical History:   Diagnosis Date    Arthritis current    knees, hands, elbows    ASTHMA 2008    recurring    Atrial fibrillation (HCC)     Breath shortness 02/25/2019    uses oxygen at 3 liters/min cont. \"Preferred\" oxygen company    Bronchitis     Cancer (HCC) 1980s    Hodgkins lymphoma    COPD with acute exacerbation (HCC) 2/25/2015    Coronary artery disease due to lipid rich " plaque - moderate RCA FFR negative     Diabetes ([facility])     Dyslipidemia     High cholesterol 2019    Hypertension     Indigestion current    tx with OTC rolaids    MI (myocardial infarction) ([facility])     Oxygen dependent     PAF (paroxysmal atrial fibrillation) ([facility]) - s/p ablation      Persistent atrial fibrillation ([facility]) 2016    Pneumonia     Pulmonary emphysema ([facility])     Pulmonary nodule     Restless leg syndrome     Sepsis ([facility]) 2015    Sleep apnea        Social History     Socioeconomic History    Marital status:      Spouse name: Not on file    Number of children: Not on file    Years of education: Not on file    Highest education level: Not on file   Occupational History    Not on file   Tobacco Use    Smoking status: Former     Packs/day: 1.00     Years: 35.00     Pack years: 35.00     Types: Cigarettes     Quit date: 3/21/2013     Years since quittin.5    Smokeless tobacco: Never    Tobacco comments:     quit 4 days ago, smoked less than 1ppd   Vaping Use    Vaping Use: Never used   Substance and Sexual Activity    Alcohol use: No    Drug use: No     Comment: former cocaine 'lots of it', former meth clean 9 years    Sexual activity: Not on file   Other Topics Concern    Not on file   Social History Narrative    Not on file     Social Determinants of Health     Financial Resource Strain: Not on file   Food Insecurity: Not on file   Transportation Needs: Not on file   Physical Activity: Not on file   Stress: Not on file   Social Connections: Not on file   Intimate Partner Violence: Not on file   Housing Stability: Not on file       Family History   Problem Relation Age of Onset    Heart Disease Mother     Other Mother 72        pacemaker    Lung Disease Father         severe asthma    Alcohol/Drug Maternal Grandfather     Lung Disease Paternal Grandmother        Current Outpatient Medications on File Prior to Visit   Medication Sig Dispense Refill    clobetasol (TEMOVATE) 0.05 %  Cream APPLY ONE GRAM TOPICALLY ONCE DAILY      ONETOUCH VERIO strip       Tiotropium Bromide-Olodaterol (STIOLTO RESPIMAT) 2.5-2.5 MCG/ACT Aero Soln Take 2 Puffs by mouth every day. 2 Each 0    Blood Glucose Test Strips Test strips order: Test strips for One Touch Verio meter. Sig: use BID and prn ssx high or low sugar #100 RF x 3 100 Strip 11    Blood Glucose Monitoring Suppl Device Meter: Dispense Device of Insurance Preference. Sig. Use as directed for blood sugar monitoring. #1. NR. 1 Each 1    albuterol 108 (90 Base) MCG/ACT Aero Soln inhalation aerosol Inhale 2 Puffs every four hours as needed for Shortness of Breath. 1 Each 11    metformin (GLUCOPHAGE) 1000 MG tablet Take 1 Tablet by mouth 2 times a day. Indications: Twice a day 200 Tablet 3    nitroglycerin (NITROSTAT) 0.4 MG SL Tab Place 1 Tablet under the tongue as needed for Chest Pain (every 5 minutes up to 3 doses, if chest pain still present call 911). 25 Tablet 1    simvastatin (ZOCOR) 20 MG Tab Take 1 Tablet by mouth every evening. 90 Tablet 3    Clobetasol Propionate 0.025 % Cream Apply 1 Inch topically 1 time a day as needed. 1 Each 2    atenolol (TENORMIN) 25 MG Tab Take 1 Tablet by mouth every day. 90 Tablet 3    OXYGEN-HELIUM INH oxygen       No current facility-administered medications on file prior to visit.       Cephalexin, Clindamycin, Codeine, Keflex, Lisinopril, Penicillins, Codeine, and Rosuvastatin      ROS:   Review of Systems   Constitutional:  Negative for chills, diaphoresis, fever, malaise/fatigue and weight loss.   HENT:  Negative for congestion, ear discharge, ear pain, hearing loss, nosebleeds, sinus pain, sore throat and tinnitus.    Eyes:  Negative for blurred vision, double vision, photophobia, pain, discharge and redness.   Respiratory:  Negative for cough, hemoptysis, sputum production, shortness of breath, wheezing and stridor.    Cardiovascular:  Negative for chest pain, palpitations, orthopnea, claudication, leg swelling  "and PND.   Gastrointestinal:  Negative for abdominal pain, constipation, diarrhea, heartburn, nausea and vomiting.   Genitourinary:  Negative for dysuria and urgency.   Musculoskeletal:  Negative for back pain, falls, joint pain, myalgias and neck pain.   Skin:  Negative for itching and rash.   Neurological:  Negative for dizziness, tremors, speech change, focal weakness, weakness and headaches.   Endo/Heme/Allergies:  Negative for environmental allergies.   Psychiatric/Behavioral:  Negative for depression.      /76 (BP Location: Right arm, Patient Position: Sitting, BP Cuff Size: Adult)   Pulse 70   Resp 16   Ht 1.778 m (5' 10\")   Wt 82.6 kg (182 lb)   SpO2 98%   Physical Exam  Constitutional:       General: She is not in acute distress.     Appearance: Normal appearance. She is well-developed and normal weight.   HENT:      Head: Normocephalic and atraumatic.      Right Ear: External ear normal.      Left Ear: External ear normal.      Nose: Nose normal. No congestion.      Mouth/Throat:      Mouth: Mucous membranes are moist.      Pharynx: Oropharynx is clear. No oropharyngeal exudate.   Eyes:      General: No scleral icterus.     Extraocular Movements: Extraocular movements intact.      Conjunctiva/sclera: Conjunctivae normal.      Pupils: Pupils are equal, round, and reactive to light.   Neck:      Vascular: No JVD.      Trachea: No tracheal deviation.   Cardiovascular:      Rate and Rhythm: Normal rate and regular rhythm.      Heart sounds: Normal heart sounds. No murmur heard.    No friction rub. No gallop.   Pulmonary:      Effort: Pulmonary effort is normal. No accessory muscle usage or respiratory distress.      Breath sounds: Wheezing present. No rales.      Comments: Faint expiratory wheezes mid-lung fields, decreased bs b/l at apices  Abdominal:      General: There is no distension.      Palpations: Abdomen is soft.      Tenderness: There is no abdominal tenderness.   Musculoskeletal:         " General: No tenderness or deformity. Normal range of motion.      Cervical back: Normal range of motion and neck supple.      Right lower leg: No edema.      Left lower leg: No edema.   Lymphadenopathy:      Cervical: No cervical adenopathy.   Skin:     General: Skin is warm and dry.      Findings: No rash.      Nails: There is no clubbing.   Neurological:      Mental Status: She is alert and oriented to person, place, and time.      Cranial Nerves: No cranial nerve deficit.      Gait: Gait normal.   Psychiatric:         Behavior: Behavior normal.       PFTs as reviewed by me personally: as per hPI    Imaging as reviewed by me personally:  as per hPI    Assessment:  1. Chronic obstructive pulmonary disease, unspecified COPD type (HCC)  Tiotropium Bromide-Olodaterol (STIOLTO RESPIMAT) 2.5-2.5 MCG/ACT Aero Soln    PULMONARY FUNCTION TESTS -Test requested: Complete Pulmonary Function Test    DME Other    VENTOLIN  (90 Base) MCG/ACT Aero Soln inhalation aerosol    ipratropium-albuterol (DUONEB) 0.5-2.5 (3) MG/3ML nebulizer solution      2. Chronic respiratory failure with hypoxia (HCC)  DME Other    Multiple Oximetry    Multiple Oximetry      3. Former smoker            Plan:  Chronic and moderate to severe based on PFTs. Technically she is group B, possibly C. Continue stiolto; was given samples today, continue Chiqui via MDI and nebulizer. Update PFTs next visit. Tobacco free.   Chronic, 2/2 #1. O2 needs are stable. Continue supplemental O2 at 2LPM with activity and at night  Tobacco free; encouraged ongoing abstinence; declined lung Ca screening  Return in about 6 months (around 3/26/2023) for PFTs same time as follow up.

## 2022-09-27 ENCOUNTER — OFFICE VISIT (OUTPATIENT)
Dept: CARDIOLOGY | Facility: MEDICAL CENTER | Age: 62
End: 2022-09-27
Payer: COMMERCIAL

## 2022-09-27 VITALS
RESPIRATION RATE: 14 BRPM | OXYGEN SATURATION: 90 % | WEIGHT: 182 LBS | DIASTOLIC BLOOD PRESSURE: 52 MMHG | HEIGHT: 70 IN | BODY MASS INDEX: 26.05 KG/M2 | SYSTOLIC BLOOD PRESSURE: 110 MMHG | HEART RATE: 79 BPM

## 2022-09-27 DIAGNOSIS — I48.0 PAF (PAROXYSMAL ATRIAL FIBRILLATION) (HCC): Chronic | ICD-10-CM

## 2022-09-27 DIAGNOSIS — Z95.5 PRESENCE OF DRUG COATED STENT IN RIGHT CORONARY ARTERY: Chronic | ICD-10-CM

## 2022-09-27 DIAGNOSIS — Z98.890 S/P RADIOFREQUENCY ABLATION OPERATION FOR ARRHYTHMIA: ICD-10-CM

## 2022-09-27 DIAGNOSIS — Z86.79 S/P RADIOFREQUENCY ABLATION OPERATION FOR ARRHYTHMIA: ICD-10-CM

## 2022-09-27 DIAGNOSIS — E78.5 DYSLIPIDEMIA: Chronic | ICD-10-CM

## 2022-09-27 DIAGNOSIS — I25.10 CORONARY ARTERY DISEASE DUE TO LIPID RICH PLAQUE: Chronic | ICD-10-CM

## 2022-09-27 DIAGNOSIS — I25.83 CORONARY ARTERY DISEASE DUE TO LIPID RICH PLAQUE: Chronic | ICD-10-CM

## 2022-09-27 DIAGNOSIS — I47.10 SVT (SUPRAVENTRICULAR TACHYCARDIA) (HCC): ICD-10-CM

## 2022-09-27 PROCEDURE — 99214 OFFICE O/P EST MOD 30 MIN: CPT | Performed by: INTERNAL MEDICINE

## 2022-09-27 RX ORDER — SIMVASTATIN 40 MG
40 TABLET ORAL EVERY EVENING
Qty: 90 TABLET | Refills: 3 | Status: SHIPPED | OUTPATIENT
Start: 2022-09-27 | End: 2022-11-29 | Stop reason: SDUPTHER

## 2022-09-27 RX ORDER — ASPIRIN 81 MG/1
81 TABLET, CHEWABLE ORAL DAILY
Qty: 100 TABLET | COMMUNITY
End: 2022-10-25

## 2022-09-27 RX ORDER — ATENOLOL 25 MG/1
25 TABLET ORAL DAILY
Qty: 90 TABLET | Refills: 3 | Status: SHIPPED | OUTPATIENT
Start: 2022-09-27 | End: 2022-11-29 | Stop reason: SDUPTHER

## 2022-09-27 ASSESSMENT — FIBROSIS 4 INDEX: FIB4 SCORE: 0.97

## 2022-09-27 NOTE — PROGRESS NOTES
"Chief Complaint   Patient presents with    Atrial Fibrillation     F/V Dx: PAF (paroxysmal atrial fibrillation) ([facility]) - s/p ablation 2015    Coronary Artery Disease     F/V Dx: Coronary artery disease due to lipid rich plaque - moderate RCA FFR negative 9/2015      Supraventricular Tachycardia (SVT)       Subjective     Lc Briggs is a 61 y.o. female who presents today for follow-up of her history of coronary disease paroxysmal atrial fibrillation in the setting advanced lung disease    She is done well for heart has lost significant weight over the year    She is struggled with hand arthritis    Past Medical History:   Diagnosis Date    Arthritis current    knees, hands, elbows    ASTHMA 2008    recurring    Atrial fibrillation ([facility])     Breath shortness 02/25/2019    uses oxygen at 3 liters/min cont. \"Preferred\" oxygen company    Bronchitis     Cancer ([facility]) 1980s    Hodgkins lymphoma    COPD with acute exacerbation ([facility]) 2/25/2015    Coronary artery disease due to lipid rich plaque - moderate RCA FFR negative     Diabetes ([facility])     Dyslipidemia     High cholesterol 02/25/2019    Hypertension     Indigestion current    tx with OTC rolaids    MI (myocardial infarction) ([facility])     Oxygen dependent     PAF (paroxysmal atrial fibrillation) ([facility]) - s/p ablation 2015     Persistent atrial fibrillation ([facility]) 2/9/2016    Pneumonia     Pulmonary emphysema ([facility])     Pulmonary nodule     Restless leg syndrome     Sepsis ([facility]) 2/25/2015    Sleep apnea      Past Surgical History:   Procedure Laterality Date    CARDIAC CATH, RIGHT/LEFT HEART      OTHER CARDIAC SURGERY      WEDGE RESECTION LUNG       Family History   Problem Relation Age of Onset    Heart Disease Mother     Other Mother 72        pacemaker    Lung Disease Father         severe asthma    Alcohol/Drug Maternal Grandfather     Lung Disease Paternal Grandmother      Social History     Socioeconomic History    Marital status:      Spouse name: Not " "on file    Number of children: Not on file    Years of education: Not on file    Highest education level: Not on file   Occupational History    Not on file   Tobacco Use    Smoking status: Former     Packs/day: 1.00     Years: 35.00     Pack years: 35.00     Types: Cigarettes     Quit date: 3/21/2013     Years since quittin.5    Smokeless tobacco: Never    Tobacco comments:     quit 4 days ago, smoked less than 1ppd   Vaping Use    Vaping Use: Never used   Substance and Sexual Activity    Alcohol use: No    Drug use: No     Comment: former cocaine 'lots of it', former meth clean 9 years    Sexual activity: Not on file   Other Topics Concern    Not on file   Social History Narrative    Not on file     Social Determinants of Health     Financial Resource Strain: Not on file   Food Insecurity: Not on file   Transportation Needs: Not on file   Physical Activity: Not on file   Stress: Not on file   Social Connections: Not on file   Intimate Partner Violence: Not on file   Housing Stability: Not on file     Allergies   Allergen Reactions    Cephalexin Anaphylaxis and Shortness of Breath     Rxn date: .  Other reaction(s): SHORTNESS OF BREATH    Clindamycin Anaphylaxis     Rxn date: .    Codeine Rash    Keflex Anaphylaxis    Lisinopril Palpitations     Other reaction(s): PALPITATIONS    Penicillins Hives, Rash and Itching     Rxn date: \"As a child.\"    Codeine Hives, Rash and Itching     Rxn date: \"As a child.\"    Rosuvastatin Myalgia     Outpatient Encounter Medications as of 2022   Medication Sig Dispense Refill    clobetasol (TEMOVATE) 0.05 % Cream APPLY ONE GRAM TOPICALLY ONCE DAILY      VENTOLIN  (90 Base) MCG/ACT Aero Soln inhalation aerosol Inhale 2 Puffs every 6 hours as needed for Shortness of Breath. 8.5 g 11    ipratropium-albuterol (DUONEB) 0.5-2.5 (3) MG/3ML nebulizer solution use 1 vial via nebulizer every 4 hours as needed for shortness of breath 360 mL 4    ONETOUCH VERIO strip       " "Tiotropium Bromide-Olodaterol (STIOLTO RESPIMAT) 2.5-2.5 MCG/ACT Aero Soln Take 2 Puffs by mouth every day. 2 Each 0    Blood Glucose Test Strips Test strips order: Test strips for One Touch Verio meter. Sig: use BID and prn ssx high or low sugar #100 RF x 3 100 Strip 11    Blood Glucose Monitoring Suppl Device Meter: Dispense Device of Insurance Preference. Sig. Use as directed for blood sugar monitoring. #1. NR. 1 Each 1    albuterol 108 (90 Base) MCG/ACT Aero Soln inhalation aerosol Inhale 2 Puffs every four hours as needed for Shortness of Breath. 1 Each 11    metformin (GLUCOPHAGE) 1000 MG tablet Take 1 Tablet by mouth 2 times a day. Indications: Twice a day 200 Tablet 3    nitroglycerin (NITROSTAT) 0.4 MG SL Tab Place 1 Tablet under the tongue as needed for Chest Pain (every 5 minutes up to 3 doses, if chest pain still present call 911). 25 Tablet 1    simvastatin (ZOCOR) 20 MG Tab Take 1 Tablet by mouth every evening. 90 Tablet 3    atenolol (TENORMIN) 25 MG Tab Take 1 Tablet by mouth every day. 90 Tablet 3    OXYGEN-HELIUM INH oxygen      Tiotropium Bromide-Olodaterol (STIOLTO RESPIMAT) 2.5-2.5 MCG/ACT Aero Soln Take 2 Puffs by mouth every day. 2 Each 0    Clobetasol Propionate 0.025 % Cream Apply 1 Inch topically 1 time a day as needed. 1 Each 2     No facility-administered encounter medications on file as of 9/27/2022.     ROS           Objective     /52 (BP Location: Left arm, Patient Position: Sitting, BP Cuff Size: Adult)   Pulse 79   Resp 14   Ht 1.778 m (5' 10\")   Wt 82.6 kg (182 lb)   LMP 01/01/2006   SpO2 90%   BMI 26.11 kg/m²     Physical Exam  Constitutional:       General: She is not in acute distress.     Appearance: She is not diaphoretic.   HENT:      Mouth/Throat:      Comments: Wearing a mask for COVID protocol  Eyes:      General: No scleral icterus.  Neck:      Vascular: No JVD.   Cardiovascular:      Rate and Rhythm: Normal rate.      Heart sounds: Normal heart sounds. No " murmur heard.    No friction rub. No gallop.   Pulmonary:      Effort: No respiratory distress.      Breath sounds: No wheezing or rales.   Abdominal:      General: Bowel sounds are normal.      Palpations: Abdomen is soft.   Musculoskeletal:      Right lower leg: No edema.      Left lower leg: No edema.   Skin:     Findings: No rash.   Neurological:      Mental Status: She is alert. Mental status is at baseline.   Psychiatric:         Mood and Affect: Mood normal.          We reviewed in person the most recent labs  Recent Results (from the past 5040 hour(s))   COMP METABOLIC PANEL    Collection Time: 07/05/22  4:11 AM   Result Value Ref Range    Glucose 176 (H) 65 - 99 mg/dL    Bun 17 8 - 27 mg/dL    Creatinine 0.61 0.57 - 1.00 mg/dL    eGFR 100 >59 mL/min/1.73    Bun-Creatinine Ratio 28 12 - 28    Sodium 141 134 - 144 mmol/L    Potassium 4.9 3.5 - 5.2 mmol/L    Chloride 101 96 - 106 mmol/L    Co2 26 20 - 29 mmol/L    Calcium 9.6 8.7 - 10.3 mg/dL    Total Protein 7.5 6.0 - 8.5 g/dL    Albumin 4.8 3.8 - 4.8 g/dL    Globulin 2.7 1.5 - 4.5 g/dL    A-G Ratio 1.8 1.2 - 2.2    Total Bilirubin 0.5 0.0 - 1.2 mg/dL    Alkaline Phosphatase 82 44 - 121 IU/L    AST(SGOT) 11 0 - 40 IU/L    ALT(SGPT) 12 0 - 32 IU/L   LIPID PANEL    Collection Time: 07/05/22  4:11 AM   Result Value Ref Range    Cholesterol,Tot 220 (H) 100 - 199 mg/dL    Triglycerides 79 0 - 149 mg/dL    HDL 66 >39 mg/dL    VLDL Cholesterol Calc 14 5 - 40 mg/dL    LDL Chol Calc (NIH) 140 (H) 0 - 99 mg/dL    Comment: CANCELED    MICROALB/CREAT RATIO RAND. UR    Collection Time: 07/05/22  4:11 AM   Result Value Ref Range    Creatinine, Random Urine 109.2 Not Estab. mg/dL    Microalbumin, Urine Random 28.7 Not Estab. ug/mL    Albumin / Creatinine Ratio 26 0 - 29 mg/g creat   HEMOGLOBIN A1C    Collection Time: 07/05/22  4:11 AM   Result Value Ref Range    Glycohemoglobin 6.9 (H) 4.8 - 5.6 %         Assessment & Plan     1. PAF (paroxysmal atrial fibrillation) (HCC) -  s/p ablation 2015        2. Coronary artery disease due to lipid rich plaque - moderate RCA FFR negative 9/2015  atenolol (TENORMIN) 25 MG Tab      3. Dyslipidemia  simvastatin (ZOCOR) 40 MG Tab    LIPID PANEL    Lipid Profile      4. Presence of drug coated stent in right coronary artery 2019        5. S/P radiofrequency ablation operation for arrhythmia        6. SVT (supraventricular tachycardia) ([facility])            Medical Decision Making: Today's Assessment/Status/Plan:        It was my pleasure to meet with Ms. Grant Briggs.    We addressed the management of hypertension at today's visit. Blood pressure is well controlled.  We specifically assessed the labs on hypertension treatment    We addressed the management of dyslipidemia at today's visit. She is on appropriate statin. Increased simvastatin    We addressed the management of coronary artery disease.  She is on proper antiplatelet, cholesterol management and beta-blockers as appropriate.  We addressed the potential side effects and laboratory follow-up for these medications.    I will see Ms. Grant Briggs back in 1 year time and encouraged her to follow up with us over the phone or electronically using my MyChart as issues arise.    It is my pleasure to participate in the care of Ms. Grant Briggs.  Please do not hesitate to contact me with questions or concerns.    Walker Adames MD PhD FACC  Cardiologist [facility]    Please note that this dictation was created using voice recognition software. There may be errors I did not discover before finalizing the note.

## 2022-09-27 NOTE — PATIENT INSTRUCTIONS
We discussed that you should talk with primary care (or endocrinology) about oral medication: empagliflozin (JARDIANCE®  Preferred for CAD), dapagliflozin (FARXIGA®, preferred for CHF),  there are cardiovascular benefits but there are also risks.  You may also want to consider liraglutide (Victoza®), semaglutide (Ozempic®), dulaglutide (Trulicity®) which are injection with cardiovascular benefits but also risks.      Multiple Oximetry  Order: [id number]  Status: Final result    Visible to patient: No (inaccessible in MyChart)    Next appt: 10/04/2022 at 10:20 AM [facility] (Annalisa Boston M.D.)    Dx: Chronic respiratory failure with hypo...    0 Result Notes     Procedure Note    Multi-Ox Readings  Multi Ox #1 Room air   O2 sat % at rest 94   O2 sat % on exertion 88   O2 sat average on exertion     Multi Ox #2 2 LPM   O2 sat % at rest 97   O2 sat % on exertion 94   O2 sat average on exertion        Oxygen Use 2   Oxygen Frequency 24/7   Duration of need     Is the patient mobile within the home?     CPAP Use?     BIPAP Use?     Servo Titration                Last Resulted: 09/26/22  8:30 AM

## 2022-10-25 ENCOUNTER — OFFICE VISIT (OUTPATIENT)
Dept: INTERNAL MEDICINE | Facility: OTHER | Age: 62
End: 2022-10-25
Payer: COMMERCIAL

## 2022-10-25 VITALS
WEIGHT: 182 LBS | HEIGHT: 70 IN | HEART RATE: 65 BPM | DIASTOLIC BLOOD PRESSURE: 61 MMHG | SYSTOLIC BLOOD PRESSURE: 100 MMHG | TEMPERATURE: 98.3 F | OXYGEN SATURATION: 96 % | BODY MASS INDEX: 26.05 KG/M2

## 2022-10-25 DIAGNOSIS — M79.601 RIGHT ARM PAIN: ICD-10-CM

## 2022-10-25 DIAGNOSIS — L98.9 SKIN LESION OF CHEST WALL: ICD-10-CM

## 2022-10-25 DIAGNOSIS — Z12.11 SCREENING FOR COLON CANCER: ICD-10-CM

## 2022-10-25 PROBLEM — Z86.79 S/P RADIOFREQUENCY ABLATION OPERATION FOR ARRHYTHMIA: Status: RESOLVED | Noted: 2017-03-01 | Resolved: 2022-10-25

## 2022-10-25 PROBLEM — J44.9 CHRONIC OBSTRUCTIVE PULMONARY DISEASE (HCC): Status: RESOLVED | Noted: 2022-05-23 | Resolved: 2022-10-25

## 2022-10-25 PROBLEM — J96.11 CHRONIC HYPOXEMIC RESPIRATORY FAILURE (HCC): Status: RESOLVED | Noted: 2022-07-06 | Resolved: 2022-10-25

## 2022-10-25 PROBLEM — Z98.890 S/P RADIOFREQUENCY ABLATION OPERATION FOR ARRHYTHMIA: Status: RESOLVED | Noted: 2017-03-01 | Resolved: 2022-10-25

## 2022-10-25 PROBLEM — M70.21 OLECRANON BURSITIS OF RIGHT ELBOW: Status: RESOLVED | Noted: 2021-11-09 | Resolved: 2022-10-25

## 2022-10-25 PROCEDURE — 99203 OFFICE O/P NEW LOW 30 MIN: CPT | Mod: GC | Performed by: STUDENT IN AN ORGANIZED HEALTH CARE EDUCATION/TRAINING PROGRAM

## 2022-10-25 ASSESSMENT — ENCOUNTER SYMPTOMS
ABDOMINAL PAIN: 0
NAUSEA: 0
CONSTIPATION: 0
SHORTNESS OF BREATH: 1
VOMITING: 0
SPUTUM PRODUCTION: 1
WEAKNESS: 0
CHILLS: 0
COUGH: 1
DIARRHEA: 0
DIZZINESS: 0
FEVER: 0
BACK PAIN: 0
HEADACHES: 0

## 2022-10-25 ASSESSMENT — FIBROSIS 4 INDEX: FIB4 SCORE: 0.98

## 2022-10-25 NOTE — PROGRESS NOTES
CC:   Chief Complaint   Patient presents with    New Patient     Patients states she has joint pain     Knee Pain     Right x a long time                                                                                                                                            HPI:   Lc presents today with the following.    Reestablish care, since moving of previous FM ([facility]). Other primary concern is R humeral pain/lump, arthritis with swelling of R hand.    R hand pain and inability to move hand for approximately 2 weeks, has been slowly improving.    R humeral pain has been present for about 2 months, lump has been present for about 4-6. Discomfort improves with rest, worsened with heavy lifting.      Patient Active Problem List    Diagnosis Date Noted    Right arm pain 10/25/2022    Skin lesion of chest wall 10/25/2022    Presence of drug coated stent in right coronary artery 2019     PAF (paroxysmal atrial fibrillation) ([facility]) - s/p ablation 2015     Dyslipidemia     Anxiety 03/21/2016    Coronary artery disease due to lipid rich plaque - moderate RCA FFR negative 9/2015     Panlobular emphysema ([facility]) 02/09/2016    Overweight 02/09/2016    Sleep apnea 02/09/2016    Type 2 diabetes mellitus without complication ([facility]) 02/09/2016       Current Outpatient Medications   Medication Sig Dispense Refill    simvastatin (ZOCOR) 40 MG Tab Take 1 Tablet by mouth every evening. 90 Tablet 3    atenolol (TENORMIN) 25 MG Tab Take 1 Tablet by mouth every day. 90 Tablet 3    ipratropium-albuterol (DUONEB) 0.5-2.5 (3) MG/3ML nebulizer solution use 1 vial via nebulizer every 4 hours as needed for shortness of breath 360 mL 4    ONETOUCH VERIO strip       Tiotropium Bromide-Olodaterol (STIOLTO RESPIMAT) 2.5-2.5 MCG/ACT Aero Soln Take 2 Puffs by mouth every day. 2 Each 0    Blood Glucose Test Strips Test strips order: Test strips for One Touch Verio meter. Sig: use BID and prn ssx high or low sugar #100 RF x 3 100 Strip 11  "   Blood Glucose Monitoring Suppl Device Meter: Dispense Device of Insurance Preference. Sig. Use as directed for blood sugar monitoring. #1. NR. 1 Each 1    albuterol 108 (90 Base) MCG/ACT Aero Soln inhalation aerosol Inhale 2 Puffs every four hours as needed for Shortness of Breath. 1 Each 11    metformin (GLUCOPHAGE) 1000 MG tablet Take 1 Tablet by mouth 2 times a day. Indications: Twice a day 200 Tablet 3    nitroglycerin (NITROSTAT) 0.4 MG SL Tab Place 1 Tablet under the tongue as needed for Chest Pain (every 5 minutes up to 3 doses, if chest pain still present call 911). 25 Tablet 1    Clobetasol Propionate 0.025 % Cream Apply 1 Inch topically 1 time a day as needed. 1 Each 2     No current facility-administered medications for this visit.         Allergies as of 10/25/2022 - Reviewed 10/25/2022   Allergen Reaction Noted    Cephalexin Anaphylaxis and Shortness of Breath 02/25/2015    Clindamycin Anaphylaxis 02/25/2015    Codeine Rash 11/02/2020    Keflex Anaphylaxis 02/25/2015    Lisinopril Palpitations 11/22/2019    Penicillins Hives, Rash, and Itching 02/25/2015    Codeine Hives, Rash, and Itching 02/25/2015    Rosuvastatin Myalgia 01/23/2019          /61 (BP Location: Left arm, Patient Position: Sitting, BP Cuff Size: Adult)   Pulse 65   Temp 36.8 °C (98.3 °F) (Temporal)   Ht 1.778 m (5' 10\") Comment: patient did not want to get measured  Wt 82.6 kg (182 lb) Comment: patient did not want to get weighed  LMP 01/01/2006   SpO2 96%   BMI 26.11 kg/m²     Review of Systems   Constitutional:  Negative for chills and fever.   Respiratory:  Positive for cough, sputum production and shortness of breath.    Gastrointestinal:  Negative for abdominal pain, constipation, diarrhea, nausea and vomiting.   Genitourinary:  Negative for dysuria, frequency and urgency.   Musculoskeletal:  Positive for joint pain. Negative for back pain.   Neurological:  Negative for dizziness, weakness and headaches.  "     Physical Exam:  Gen:  Alert and oriented, No apparent distress.  Neuro:  CN II-XII intact, no focal deficits  Lungs:  CTAB  CV:  RRR no m/g/r  Abd:  Soft, nontender, nondistended  Skin:  Actinic keratoses/sebhorretic keratoses  Ext:  Shoulder/elbow flexion/extension 5/5 bilaterally and symmetric; ambulates independently with steady gait.      Assessment and Plan.   Lc Briggs is a 62 y.o. female with the following issues:    Right arm pain  Chronic right arm pain, with deep mass palpated. Ddx: neuroma, adenoma, lipoma.  -US soft tissue R arm    Skin lesion of chest wall  Chronic skin lesions, without irregular colour/contour.  -dermatology referral       Follow up in 3 months for preventative health discussions, referral coordinations.

## 2022-10-25 NOTE — ASSESSMENT & PLAN NOTE
Chronic right arm pain, with deep mass palpated. Ddx: neuroma, adenoma, lipoma.  -US soft tissue R arm

## 2022-11-29 ENCOUNTER — OFFICE VISIT (OUTPATIENT)
Dept: MEDICAL GROUP | Facility: OTHER | Age: 62
End: 2022-11-29
Payer: COMMERCIAL

## 2022-11-29 VITALS — RESPIRATION RATE: 16 BRPM | WEIGHT: 180 LBS | HEIGHT: 70 IN | BODY MASS INDEX: 25.77 KG/M2

## 2022-11-29 DIAGNOSIS — I25.10 CORONARY ARTERY DISEASE DUE TO LIPID RICH PLAQUE: Chronic | ICD-10-CM

## 2022-11-29 DIAGNOSIS — E78.5 DYSLIPIDEMIA: Chronic | ICD-10-CM

## 2022-11-29 DIAGNOSIS — M79.601 RIGHT ARM PAIN: ICD-10-CM

## 2022-11-29 DIAGNOSIS — E11.9 TYPE 2 DIABETES MELLITUS WITHOUT COMPLICATION, WITHOUT LONG-TERM CURRENT USE OF INSULIN (HCC): ICD-10-CM

## 2022-11-29 DIAGNOSIS — L30.9 DERMATITIS: ICD-10-CM

## 2022-11-29 DIAGNOSIS — M25.461 EFFUSION OF RIGHT KNEE JOINT: ICD-10-CM

## 2022-11-29 DIAGNOSIS — J44.9 CHRONIC OBSTRUCTIVE PULMONARY DISEASE, UNSPECIFIED COPD TYPE (HCC): ICD-10-CM

## 2022-11-29 DIAGNOSIS — I25.83 CORONARY ARTERY DISEASE DUE TO LIPID RICH PLAQUE: Chronic | ICD-10-CM

## 2022-11-29 DIAGNOSIS — I48.0 PAF (PAROXYSMAL ATRIAL FIBRILLATION) (HCC): Chronic | ICD-10-CM

## 2022-11-29 PROCEDURE — 99214 OFFICE O/P EST MOD 30 MIN: CPT | Performed by: FAMILY MEDICINE

## 2022-11-29 RX ORDER — ALBUTEROL SULFATE 90 UG/1
2 AEROSOL, METERED RESPIRATORY (INHALATION) EVERY 4 HOURS PRN
Qty: 3 EACH | Refills: 3 | Status: SHIPPED | OUTPATIENT
Start: 2022-11-29 | End: 2023-12-22 | Stop reason: SDUPTHER

## 2022-11-29 RX ORDER — ATENOLOL 25 MG/1
25 TABLET ORAL DAILY
Qty: 90 TABLET | Refills: 3 | Status: SHIPPED | OUTPATIENT
Start: 2022-11-29 | End: 2023-12-04

## 2022-11-29 RX ORDER — IPRATROPIUM BROMIDE AND ALBUTEROL SULFATE 2.5; .5 MG/3ML; MG/3ML
SOLUTION RESPIRATORY (INHALATION)
Qty: 360 ML | Refills: 4 | Status: SHIPPED | OUTPATIENT
Start: 2022-11-29 | End: 2023-06-08 | Stop reason: SDUPTHER

## 2022-11-29 RX ORDER — SIMVASTATIN 40 MG
40 TABLET ORAL EVERY EVENING
Qty: 90 TABLET | Refills: 3 | Status: SHIPPED
Start: 2022-11-29 | End: 2023-04-25

## 2022-11-29 ASSESSMENT — FIBROSIS 4 INDEX: FIB4 SCORE: 0.98

## 2022-11-29 NOTE — ASSESSMENT & PLAN NOTE
Encouraged moisturizers --- Cetaphil, Lubriderm, or similar product.   Clobetasol prn itching.   Dermatology follow-up in near future.

## 2022-11-29 NOTE — ASSESSMENT & PLAN NOTE
Discussed she can see my Sports Medicine colleagues as needed for draining of her recurrent effusion.

## 2022-11-29 NOTE — PROGRESS NOTES
[facility]     PATIENT ID:  NAME:  Lc Briggs  MRN:               [id number]  YOB: 1960    Date: 8:51 AM      CC:  arm pain, rash, knee pain, sore joints, COPD      HPI: Lc Briggs is a 62 y.o. female who presented with multiple concerns.     Problem   Dermatitis    Has had some persistent dermatitis of right and left wrist areas on chronic basis.  Does itch.  Has used Clobetasol on and off without much effect.  Also, has a rash on lower legs that itches.  Has no lower leg swelling.  Does have some diabetic neuropathy.      Right Arm Pain    Has persistent right arm pain and just had an ultrasound done at [facility] on 11/1/22 which showed nothing obvious.  Had a firmness to palpation in deep tissues of right arm.  Has bothered her for three months.  No known injury or trauma.      Chronic Obstructive Pulmonary Disease (Hcc) (Resolved)    Has been breathing better lately.  Saw Pulmonary recently.  Has Duoneb for use if she needs it.      Effusion of Right Knee Joint (Resolved)    Gets her knee drained every so often and has chronic right knee pain.  Takes Tylenol daily which helps.          REVIEW OF SYSTEMS:   Ten systems reviewed and were negative except as noted in the HPI.                PROBLEM LIST  Patient Active Problem List   Diagnosis    Panlobular emphysema (HCC)    Overweight    Sleep apnea    Type 2 diabetes mellitus without complication (HCC)    Anxiety    Coronary artery disease due to lipid rich plaque - moderate RCA FFR negative 9/2015    PAF (paroxysmal atrial fibrillation) ([facility]) - s/p ablation 2015    Dyslipidemia    Presence of drug coated stent in right coronary artery 2019    Right arm pain    Skin lesion of chest wall    Dermatitis        PAST SURGICAL HISTORY:  Past Surgical History:   Procedure Laterality Date    CARDIAC CATH, RIGHT/LEFT HEART      OTHER CARDIAC SURGERY      WEDGE RESECTION LUNG         FAMILY HISTORY:  Family History   Problem  "Relation Age of Onset    Heart Disease Mother     Other Mother 72        pacemaker    Lung Disease Father         severe asthma    Alcohol/Drug Maternal Grandfather     Lung Disease Paternal Grandmother        SOCIAL HISTORY:   Social History     Tobacco Use    Smoking status: Former     Packs/day: 1.00     Years: 35.00     Pack years: 35.00     Types: Cigarettes     Quit date: 3/21/2013     Years since quittin.6    Smokeless tobacco: Never    Tobacco comments:     quit 4 days ago, smoked less than 1ppd   Substance Use Topics    Alcohol use: No       ALLERGIES:  Allergies   Allergen Reactions    Cephalexin Anaphylaxis and Shortness of Breath     Rxn date: .  Other reaction(s): SHORTNESS OF BREATH    Clindamycin Anaphylaxis     Rxn date: .    Codeine Rash    Keflex Anaphylaxis    Lisinopril Palpitations     Other reaction(s): PALPITATIONS    Penicillins Hives, Rash and Itching     Rxn date: \"As a child.\"    Codeine Hives, Rash and Itching     Rxn date: \"As a child.\"    Rosuvastatin Myalgia       OUTPATIENT MEDICATIONS:    Current Outpatient Medications:     simvastatin (ZOCOR) 40 MG Tab, Take 1 Tablet by mouth every evening., Disp: 90 Tablet, Rfl: 3    atenolol (TENORMIN) 25 MG Tab, Take 1 Tablet by mouth every day., Disp: 90 Tablet, Rfl: 3    ipratropium-albuterol (DUONEB) 0.5-2.5 (3) MG/3ML nebulizer solution, use 1 vial via nebulizer every 4 hours as needed for shortness of breath, Disp: 360 mL, Rfl: 4    ONETOUCH VERIO strip, , Disp: , Rfl:     Tiotropium Bromide-Olodaterol (STIOLTO RESPIMAT) 2.5-2.5 MCG/ACT Aero Soln, Take 2 Puffs by mouth every day., Disp: 2 Each, Rfl: 0    Blood Glucose Test Strips, Test strips order: Test strips for One Touch Verio meter. Sig: use BID and prn ssx high or low sugar #100 RF x 3, Disp: 100 Strip, Rfl: 11    Blood Glucose Monitoring Suppl Device, Meter: Dispense Device of Insurance Preference. Sig. Use as directed for blood sugar monitoring. #1. NR., Disp: 1 Each, " "Rfl: 1    albuterol 108 (90 Base) MCG/ACT Aero Soln inhalation aerosol, Inhale 2 Puffs every four hours as needed for Shortness of Breath., Disp: 1 Each, Rfl: 11    metformin (GLUCOPHAGE) 1000 MG tablet, Take 1 Tablet by mouth 2 times a day. Indications: Twice a day, Disp: 200 Tablet, Rfl: 3    nitroglycerin (NITROSTAT) 0.4 MG SL Tab, Place 1 Tablet under the tongue as needed for Chest Pain (every 5 minutes up to 3 doses, if chest pain still present call 911)., Disp: 25 Tablet, Rfl: 1    Clobetasol Propionate 0.025 % Cream, Apply 1 Inch topically 1 time a day as needed., Disp: 1 Each, Rfl: 2    PHYSICAL EXAM:  Vitals:    11/29/22 0806   BP: (P) 110/50   BP Location: (P) Left arm   Patient Position: (P) Sitting   BP Cuff Size: (P) Adult   Pulse: (P) 80   Resp: 16   Temp: (P) 36.3 °C (97.3 °F)   TempSrc: (P) Temporal   SpO2: (P) 92%   Weight: 81.6 kg (180 lb)   Height: 1.778 m (5' 10\")       General: Pt resting in NAD, cooperative   Skin:  Pink, warm and dry.  Has some chronic eczematous changes on anterior wrists and also on left lower leg that may be venous stasis or diabetes related, changes.   HEENT: NC/AT. EOMI.  Extremities:  Full range of motion. No right knee effusion noted. Has a tender firm area of right upper arm tissues noted.   CNS:  Muscle tone is normal. No gross focal neurologic deficits      ASSESSMENT/PLAN:   62 y.o. female     Problem List Items Addressed This Visit       Coronary artery disease due to lipid rich plaque - moderate RCA FFR negative 9/2015 (Chronic)    Dyslipidemia (Chronic)    PAF (paroxysmal atrial fibrillation) (HCC) - s/p ablation 2015 (Chronic)    RESOLVED: Chronic obstructive pulmonary disease (HCC)     May refill pulmonary meds as needed.   Encouraged flu shot, new Covid booster.  -Discussion of benefits of vaccines at her age and with COPD.          Dermatitis     Encouraged moisturizers --- Cetaphil, Lubriderm, or similar product.   Clobetasol prn itching.   Dermatology " follow-up in near future.            RESOLVED: Effusion of right knee joint     Discussed she can see my Sports Medicine colleagues as needed for draining of her recurrent effusion.          Right arm pain     U/S report reviewed with patient.   Will proceed to MRI right arm.          Relevant Orders    MR-HUMERUS W/O RIGHT    Type 2 diabetes mellitus without complication (HCC)     Right knee effusion --- discussed can schedule here with Sports Medicine when needed for knee joint aspiration.     Dewayne Benton MD  [facility]

## 2022-11-29 NOTE — ASSESSMENT & PLAN NOTE
May refill pulmonary meds as needed.   Encouraged flu shot, new Covid booster.  -Discussion of benefits of vaccines at her age and with COPD.

## 2022-12-12 ENCOUNTER — TELEPHONE (OUTPATIENT)
Dept: MEDICAL GROUP | Facility: CLINIC | Age: 62
End: 2022-12-12

## 2022-12-12 DIAGNOSIS — J96.11 CHRONIC HYPOXEMIC RESPIRATORY FAILURE (HCC): ICD-10-CM

## 2022-12-12 DIAGNOSIS — J43.1 PANLOBULAR EMPHYSEMA (HCC): ICD-10-CM

## 2022-12-12 NOTE — TELEPHONE ENCOUNTER
Pt called requesting a to Pulmonology, she sees  at [facility].  Thank you

## 2022-12-13 ENCOUNTER — APPOINTMENT (OUTPATIENT)
Dept: RADIOLOGY | Facility: MEDICAL CENTER | Age: 62
End: 2022-12-13
Attending: FAMILY MEDICINE
Payer: COMMERCIAL

## 2022-12-19 ENCOUNTER — HOSPITAL ENCOUNTER (OUTPATIENT)
Dept: RADIOLOGY | Facility: MEDICAL CENTER | Age: 62
End: 2022-12-19
Attending: FAMILY MEDICINE
Payer: COMMERCIAL

## 2022-12-19 DIAGNOSIS — M79.601 RIGHT ARM PAIN: ICD-10-CM

## 2022-12-19 PROCEDURE — 73218 MRI UPPER EXTREMITY W/O DYE: CPT | Mod: RT

## 2022-12-20 ENCOUNTER — TELEPHONE (OUTPATIENT)
Dept: MEDICAL GROUP | Facility: OTHER | Age: 62
End: 2022-12-20

## 2022-12-20 NOTE — TELEPHONE ENCOUNTER
Lc had her MRI yesterday and wanted to follow up with Dr. Benton about the findings. She would like to be contacted.    [phone]    Lc is calling in today and would like to know how to follow up. I can schedule her, but don't know if you want me to go over this with her. She's stating she's in pain and wants to know how to follow [facility], can you reach out to her? Thank you for taking a look

## 2022-12-21 NOTE — TELEPHONE ENCOUNTER
Called and spoke with pt, advised of MRI findings and  recommends that she see one of the Sports Medicine doctors for possible cortisone injection.

## 2023-01-10 ENCOUNTER — OFFICE VISIT (OUTPATIENT)
Dept: MEDICAL GROUP | Facility: OTHER | Age: 63
End: 2023-01-10
Payer: COMMERCIAL

## 2023-01-10 VITALS
HEIGHT: 70 IN | BODY MASS INDEX: 25.77 KG/M2 | SYSTOLIC BLOOD PRESSURE: 120 MMHG | OXYGEN SATURATION: 92 % | WEIGHT: 180 LBS | TEMPERATURE: 98.4 F | HEART RATE: 60 BPM | DIASTOLIC BLOOD PRESSURE: 56 MMHG

## 2023-01-10 DIAGNOSIS — M79.641 PAIN OF RIGHT HAND: ICD-10-CM

## 2023-01-10 DIAGNOSIS — M79.601 RIGHT ARM PAIN: ICD-10-CM

## 2023-01-10 PROCEDURE — 20610 DRAIN/INJ JOINT/BURSA W/O US: CPT | Performed by: FAMILY MEDICINE

## 2023-01-10 PROCEDURE — 99213 OFFICE O/P EST LOW 20 MIN: CPT | Mod: 25 | Performed by: FAMILY MEDICINE

## 2023-01-10 ASSESSMENT — FIBROSIS 4 INDEX: FIB4 SCORE: 0.98

## 2023-01-19 PROBLEM — M79.641 PAIN OF RIGHT HAND: Status: ACTIVE | Noted: 2023-01-19

## 2023-01-19 RX ORDER — TRIAMCINOLONE ACETONIDE 40 MG/ML
40 INJECTION, SUSPENSION INTRA-ARTICULAR; INTRAMUSCULAR ONCE
OUTPATIENT
Start: 2023-01-19 | End: 2023-01-22

## 2023-01-19 ASSESSMENT — ENCOUNTER SYMPTOMS
EYES NEGATIVE: 1
PSYCHIATRIC NEGATIVE: 1
CONSTITUTIONAL NEGATIVE: 1
CARDIOVASCULAR NEGATIVE: 1
NEUROLOGICAL NEGATIVE: 1
RESPIRATORY NEGATIVE: 1
GASTROINTESTINAL NEGATIVE: 1

## 2023-01-19 NOTE — ASSESSMENT & PLAN NOTE
Believe that the majority of his pain is probably rotator cuff generated.  In addition I think that there may be some subacromial issues as well so went ahead and injected him today using a glenohumeral approach.  We will follow-up with him in 3 to 4 weeks if he is not any better.  Recommend physical therapy as well consult written

## 2023-01-19 NOTE — ASSESSMENT & PLAN NOTE
Exam it appears that he has little bit of Dupuytren's contracture starting on his right hand will refer to Ortho hand at this moment we will follow-up in a month

## 2023-01-19 NOTE — PROGRESS NOTES
Subjective:   CC:   Chief Complaint   Patient presents with    Bump     Bump on right arm       HPI: Lc is a 62 y.o. female who presents today for the following problems:    Problem   Pain of Right Hand    She also came in today with her right hand pain.  He states that he feels like he cannot extend his finger ring finger completely.  States she had this for quite some time its been probably over a year since he noticed this.  Denies any other issues with his hands no numbness or tingling and states that he is concerned about his loss of range of motion.     Right Arm Pain    Patient in for follow-up right arm pain.  States that he is still having right arm pain had an ultrasound ordered by Dr. Benton which was negative for any abnormalities in the right humeral area.  Patient states that he has a pain sort of at the mid deltoid area that the pain does from time to time radiate down to the biceps area.  States is a sharp pain denies any numbness or tingling.  And is worse when he is active and using the shoulder.          Current Outpatient Medications   Medication Sig Dispense Refill    metformin (GLUCOPHAGE) 1000 MG tablet Take 1 Tablet by mouth 2 times a day. Indications: Twice a day 200 Tablet 3    atenolol (TENORMIN) 25 MG Tab Take 1 Tablet by mouth every day. 90 Tablet 3    simvastatin (ZOCOR) 40 MG Tab Take 1 Tablet by mouth every evening. 90 Tablet 3    albuterol 108 (90 Base) MCG/ACT Aero Soln inhalation aerosol Inhale 2 Puffs every four hours as needed for Shortness of Breath. 3 Each 3    ipratropium-albuterol (DUONEB) 0.5-2.5 (3) MG/3ML nebulizer solution use 1 vial via nebulizer every 4 hours as needed for shortness of breath 360 mL 4    ONETOUCH VERIO strip       Blood Glucose Test Strips Test strips order: Test strips for One Touch Verio meter. Sig: use BID and prn ssx high or low sugar #100 RF x 3 100 Strip 11    Blood Glucose Monitoring Suppl Device Meter: Dispense Device of Insurance  "Preference. Sig. Use as directed for blood sugar monitoring. #1. NR. 1 Each 1    nitroglycerin (NITROSTAT) 0.4 MG SL Tab Place 1 Tablet under the tongue as needed for Chest Pain (every 5 minutes up to 3 doses, if chest pain still present call 911). 25 Tablet 1    Clobetasol Propionate 0.025 % Cream Apply 1 Inch topically 1 time a day as needed. 1 Each 2    Tiotropium Bromide-Olodaterol (STIOLTO RESPIMAT) 2.5-2.5 MCG/ACT Aero Soln Take 2 Puffs by mouth every day. (Patient not taking: Reported on 1/10/2023) 2 Each 0     Current Facility-Administered Medications   Medication Dose Route Frequency Provider Last Rate Last Admin    triamcinolone acetonide (KENALOG-40) injection 40 mg  40 mg Injection Once Tres Neves M.D.           Social History     Tobacco Use    Smoking status: Former     Packs/day: 1.00     Years: 35.00     Pack years: 35.00     Types: Cigarettes     Quit date: 3/21/2013     Years since quittin.8    Smokeless tobacco: Never    Tobacco comments:     quit 4 days ago, smoked less than 1ppd   Vaping Use    Vaping Use: Never used   Substance Use Topics    Alcohol use: No    Drug use: No     Comment: former cocaine 'lots of it', former meth clean 9 years       Review of Systems   Constitutional: Negative.    HENT: Negative.     Eyes: Negative.    Respiratory: Negative.     Cardiovascular: Negative.    Gastrointestinal: Negative.    Skin: Negative.    Neurological: Negative.    Psychiatric/Behavioral: Negative.         Objective:     Vitals:    01/10/23 0843   BP: 120/56   BP Location: Left arm   Patient Position: Sitting   Pulse: 60   Temp: 36.9 °C (98.4 °F)   TempSrc: Temporal   SpO2: 92%   Weight: 81.6 kg (180 lb)   Height: 1.778 m (5' 10\")     Body mass index is 25.83 kg/m².     Physical Exam  Vitals reviewed.   Constitutional:       Appearance: Normal appearance.   HENT:      Head: Normocephalic and atraumatic.   Cardiovascular:      Rate and Rhythm: Normal rate and regular rhythm.      Pulses: " Normal pulses.      Heart sounds: No murmur heard.    No friction rub. No gallop.   Pulmonary:      Effort: Pulmonary effort is normal.      Breath sounds: Normal breath sounds.   Musculoskeletal:      Comments: Rt Shoulder     No ttp   Funmilayo but painful at extremes Lift off test was negative   Neers was negative   Hawkings/Hank's was negative   Jobes was negative   Obriens was negative    crank tests were negative   Speeds was negative   Yeargasons was negative   Cross body ac test was negative   Apprehension test was negative        Rt hand - appreciate mild dupytrens contracture like apperance    Neurological:      Mental Status: She is alert.     Patient consented  Patient prepped in usual fashion  1 mL of Kenalog (40 mg/mL) was combined with 4 cc of 2% lidocaine and a 10 cc syringe.  The syringe was capped with a 22-gauge 1-1/2 inch needle and the entire solution was delivered and 1 unit dose utilizing a posterior glenohumeral approach.  Ethyl chloride was used as a topical anesthetic the patient tolerated the procedure well.  A Band-Aid was placed.       Assessment & Plan:   Right arm pain  Believe that the majority of his pain is probably rotator cuff generated.  In addition I think that there may be some subacromial issues as well so went ahead and injected him today using a glenohumeral approach.  We will follow-up with him in 3 to 4 weeks if he is not any better.  Recommend physical therapy as well consult written    Pain of right hand  Exam it appears that he has little bit of Dupuytren's contracture starting on his right hand will refer to Ortho hand at this moment we will follow-up in a month      Followup: Return in about 1 month (around 2/10/2023), or if symptoms worsen or fail to improve.    Tres Neves M.D.    Please note that this dictation was created using voice recognition software. I have made every reasonable attempt to correct obvious errors, but I expect that there are errors of  grammar and possibly content that I did not discover before finalizing the note.

## 2023-02-28 ENCOUNTER — TELEPHONE (OUTPATIENT)
Dept: MEDICAL GROUP | Facility: OTHER | Age: 63
End: 2023-02-28

## 2023-03-01 NOTE — TELEPHONE ENCOUNTER
Lc called and wants a referral to Pulmonary, Dr. Dejuan Green, and additional referral to   Cardiology . . .    She would like to be contacted so she can make an appointment. She was left with a bill last time she saw pulmonologist because she didn't have a referral.    Her phone number is [phone]

## 2023-03-06 ENCOUNTER — TELEPHONE (OUTPATIENT)
Dept: MEDICAL GROUP | Facility: OTHER | Age: 63
End: 2023-03-06

## 2023-03-06 DIAGNOSIS — J43.1 PANLOBULAR EMPHYSEMA (HCC): ICD-10-CM

## 2023-03-06 DIAGNOSIS — L30.9 DERMATITIS: ICD-10-CM

## 2023-03-06 DIAGNOSIS — L98.9 SKIN LESION OF CHEST WALL: ICD-10-CM

## 2023-03-06 DIAGNOSIS — I48.0 PAF (PAROXYSMAL ATRIAL FIBRILLATION) (HCC): Chronic | ICD-10-CM

## 2023-03-06 NOTE — TELEPHONE ENCOUNTER
Lc called and wants a referral to Pulmonary, Dr. Dejuan Green, and additional referral to   Cardiology . . .    She wants the Cardiology to be [facility]    She also wants a referral to Dermatology  [facility] / Dr. Reinier Crenshaw     She would like to be contacted so she can make an appointment. She was left with a bill last time she saw pulmonologist because she didn't have a referral.    This is her second request as she is adding more referral requests and asked if possibly Dr. Neves could do it if [facility] isn't available.     Her phone number is [phone]

## 2023-04-24 ENCOUNTER — OFFICE VISIT (OUTPATIENT)
Dept: MEDICAL GROUP | Facility: OTHER | Age: 63
End: 2023-04-24
Payer: COMMERCIAL

## 2023-04-24 VITALS
SYSTOLIC BLOOD PRESSURE: 116 MMHG | TEMPERATURE: 98.6 F | OXYGEN SATURATION: 90 % | DIASTOLIC BLOOD PRESSURE: 52 MMHG | HEART RATE: 73 BPM | BODY MASS INDEX: 26.48 KG/M2 | HEIGHT: 70 IN | WEIGHT: 185 LBS

## 2023-04-24 DIAGNOSIS — L30.8 OTHER ECZEMA: ICD-10-CM

## 2023-04-24 DIAGNOSIS — M25.561 CHRONIC PAIN OF RIGHT KNEE: ICD-10-CM

## 2023-04-24 DIAGNOSIS — E11.9 TYPE 2 DIABETES MELLITUS WITHOUT COMPLICATION, WITHOUT LONG-TERM CURRENT USE OF INSULIN (HCC): ICD-10-CM

## 2023-04-24 DIAGNOSIS — R59.9 SWOLLEN LYMPH NODES: ICD-10-CM

## 2023-04-24 DIAGNOSIS — I25.10 CORONARY ARTERY DISEASE DUE TO LIPID RICH PLAQUE: Chronic | ICD-10-CM

## 2023-04-24 DIAGNOSIS — G89.29 CHRONIC PAIN OF RIGHT KNEE: ICD-10-CM

## 2023-04-24 DIAGNOSIS — I25.83 CORONARY ARTERY DISEASE DUE TO LIPID RICH PLAQUE: Chronic | ICD-10-CM

## 2023-04-24 DIAGNOSIS — I20.0 UNSTABLE ANGINA (HCC): ICD-10-CM

## 2023-04-24 PROCEDURE — 99214 OFFICE O/P EST MOD 30 MIN: CPT | Mod: GC | Performed by: FAMILY MEDICINE

## 2023-04-24 RX ORDER — CLOBETASOL PROPIONATE 0.5 MG/G
1 CREAM TOPICAL 2 TIMES DAILY
Qty: 30 G | Refills: 0 | Status: SHIPPED | OUTPATIENT
Start: 2023-04-24

## 2023-04-24 RX ORDER — NITROGLYCERIN 0.4 MG/1
0.4 TABLET SUBLINGUAL PRN
Qty: 25 TABLET | Refills: 1 | Status: SHIPPED | OUTPATIENT
Start: 2023-04-24

## 2023-04-24 RX ORDER — SIMVASTATIN 20 MG
TABLET ORAL
COMMUNITY
End: 2023-04-24 | Stop reason: SDUPTHER

## 2023-04-24 RX ORDER — ASPIRIN 81 MG/1
81 TABLET, CHEWABLE ORAL DAILY
COMMUNITY

## 2023-04-24 RX ORDER — SIMVASTATIN 20 MG
TABLET ORAL
Qty: 90 TABLET | Refills: 3 | Status: SHIPPED | OUTPATIENT
Start: 2023-04-24 | End: 2023-12-22 | Stop reason: SDUPTHER

## 2023-04-24 ASSESSMENT — FIBROSIS 4 INDEX: FIB4 SCORE: 0.98

## 2023-04-24 NOTE — PROGRESS NOTES
Subjective:   Lc Briggs is a 62 y.o. female here for the evaluation and management of Knee Pain (Right knee pain/Medication refills)    Printed out the referrals    Problem   Swollen Lymph Nodes    Concerns for a lump in front of her right ear.  Unsure how long is been present for.  Concerned with her CAD that it would be associated with that.  Denies fever/chills.  Denies weight loss.     Chronic Pain of Right Knee    Continues to endorse right knee tightness.  More on the inside aspect of her knee.  Reports a history of an effusion that required aspiration, and per chart review, had around 50 cc drained.  Denies recent fevers/chills.     Eczema    Continues to have persistent dermatitis the right and left wrists and hands.  Pruritic on occasion.  Clobetasol has provided some benefit.  Would like a refill on this.     Coronary artery disease due to lipid rich plaque - moderate RCA FFR negative 9/2015    Concerns regarding medications.  Needs a refill on nitroglycerin.  Has not had to use it often, but believes her prescription is expiring.  Also has concerns with the simvastatin.  The 40 mg tablet was too much for her.  She would like a refill on the 20 mg tablet.       Type 2 Diabetes Mellitus Without Complication (Hcc)    On oral medication         ROS    Current Outpatient Medications   Medication Sig Dispense Refill    aspirin (ASA) 81 MG Chew Tab chewable tablet Chew 81 mg every day.      nitroglycerin (NITROSTAT) 0.4 MG SL Tab Place 1 Tablet under the tongue as needed for Chest Pain (every 5 minutes up to 3 doses, if chest pain still present call 911). 25 Tablet 1    simvastatin (ZOCOR) 20 MG Tab simvastatin 20 mg tablet PO qhs 90 Tablet 3    clobetasol (TEMOVATE) 0.05 % Cream Apply 1 Each topically 2 times a day. 30 g 0    metformin (GLUCOPHAGE) 1000 MG tablet Take 1 Tablet by mouth 2 times a day. Indications: Twice a day 200 Tablet 3    atenolol (TENORMIN) 25 MG Tab Take 1 Tablet by mouth  "every day. 90 Tablet 3    albuterol 108 (90 Base) MCG/ACT Aero Soln inhalation aerosol Inhale 2 Puffs every four hours as needed for Shortness of Breath. 3 Each 3    ipratropium-albuterol (DUONEB) 0.5-2.5 (3) MG/3ML nebulizer solution use 1 vial via nebulizer every 4 hours as needed for shortness of breath 360 mL 4    ONETOUCH VERIO strip       Blood Glucose Test Strips Test strips order: Test strips for One Touch Verio meter. Sig: use BID and prn ssx high or low sugar #100 RF x 3 100 Strip 11    Blood Glucose Monitoring Suppl Device Meter: Dispense Device of Insurance Preference. Sig. Use as directed for blood sugar monitoring. #1. NR. 1 Each 1    Tiotropium Bromide-Olodaterol (STIOLTO RESPIMAT) 2.5-2.5 MCG/ACT Aero Soln Take 2 Puffs by mouth every day. (Patient not taking: Reported on 1/10/2023) 2 Each 0     No current facility-administered medications for this visit.       Allergies  Cephalexin, Clindamycin, Codeine, Keflex, Lisinopril, Penicillins, Codeine, and Rosuvastatin    Past Medical History:   Diagnosis Date    Arthritis current    knees, hands, elbows    ASTHMA 2008    recurring    Atrial fibrillation ([facility])     Breath shortness 02/25/2019    uses oxygen at 3 liters/min cont. \"Preferred\" oxygen company    Bronchitis     Cancer ([facility]) 1980s    Hodgkins lymphoma    COPD with acute exacerbation ([facility]) 2/25/2015    Coronary artery disease due to lipid rich plaque - moderate RCA FFR negative     Diabetes ([facility])     Dyslipidemia     High cholesterol 02/25/2019    Hypertension     Indigestion current    tx with OTC rolaids    MI (myocardial infarction) ([facility])     Oxygen dependent     PAF (paroxysmal atrial fibrillation) ([facility]) - s/p ablation 2015     Persistent atrial fibrillation ([facility]) 2/9/2016    Pneumonia     Pulmonary emphysema ([facility])     Pulmonary nodule     Restless leg syndrome     Sepsis ([facility]) 2/25/2015    Sleep apnea        Patient Active Problem List    Diagnosis Date Noted    Swollen lymph nodes 04/24/2023 " "   Chronic pain of right knee 04/24/2023    Pain of right hand 01/19/2023    Eczema 11/29/2022    Right arm pain 10/25/2022    Skin lesion of chest wall 10/25/2022    Presence of drug coated stent in right coronary artery 2019     PAF (paroxysmal atrial fibrillation) ([facility]) - s/p ablation 2015     Dyslipidemia     Anxiety 03/21/2016    Coronary artery disease due to lipid rich plaque - moderate RCA FFR negative 9/2015     Panlobular emphysema ([facility]) 02/09/2016    Overweight 02/09/2016    Sleep apnea 02/09/2016    Type 2 diabetes mellitus without complication ([facility]) 02/09/2016       Past Surgical History  Past Surgical History:   Procedure Laterality Date    CARDIAC CATH, RIGHT/LEFT HEART      OTHER CARDIAC SURGERY      WEDGE RESECTION LUNG         Social History     Socioeconomic History    Marital status:    Tobacco Use    Smoking status: Former     Packs/day: 1.00     Years: 35.00     Pack years: 35.00     Types: Cigarettes     Quit date: 3/21/2013     Years since quitting: 10.1    Smokeless tobacco: Never    Tobacco comments:     quit 4 days ago, smoked less than 1ppd   Vaping Use    Vaping Use: Never used   Substance and Sexual Activity    Alcohol use: No    Drug use: No     Comment: former cocaine 'lots of it', former meth clean 9 years        Objective:     Vitals:    04/24/23 1102   BP: 116/52   BP Location: Right arm   Pulse: 73   Temp: 37 °C (98.6 °F)   TempSrc: Temporal   SpO2: 90%   Weight: 83.9 kg (185 lb)   Height: 1.778 m (5' 10\")     Body mass index is 26.54 kg/m².     Physical Exam  Gen:  AO, NAD  ENMT: small pre-auricular mass right ear  Cardiac:  RRR  MSK:  right knee minimally tender to palpation, ligaments intact.  No effusion/ecchymosis/erythema present    Assessment and Plan:   Lc Burns Treyruth is a 62 y.o. female with a Knee Pain (Right knee pain/Medication refills)     The following was discussed with the patient today.    Problem List Items Addressed This Visit       Coronary " artery disease due to lipid rich plaque - moderate RCA FFR negative 9/2015 (Chronic)     Chronic, stable  Refilled nitroglycerin  Refilled simvastatin at 20 mg daily         Relevant Medications    nitroglycerin (NITROSTAT) 0.4 MG SL Tab    simvastatin (ZOCOR) 20 MG Tab    Other Relevant Orders    Lipid Profile    Type 2 diabetes mellitus without complication (HCC)     Chronic, stable  Will obtain hemoglobin [id number] and pending  Continue metformin 1000 mg twice daily         Relevant Orders    HEMOGLOBIN A1C    Eczema     Acute on chronic, stable  Continue moisturizers as able  Refill clobetasol:  Clobetasol 0.05% cream to be applied twice daily  Consider dermatology referral         Swollen lymph nodes     New problem to provider  We will obtain ultrasound head and neck, focused on the preauricular lymphadenopathy noted on the right         Relevant Orders    CBC WITH DIFFERENTIAL    Comp Metabolic Panel    US-SOFT TISSUES OF HEAD - NECK    Chronic pain of right knee     Acute on chronic, worsening  Exam unremarkable  At this time, will monitor for changes          Other Visit Diagnoses       Unstable angina (HCC)        Relevant Medications    nitroglycerin (NITROSTAT) 0.4 MG SL Tab    simvastatin (ZOCOR) 20 MG Tab            Followup: Return in about 4 weeks (around 5/22/2023).    Orlando Olivier M.D.    Patient seen/discussed with Dr. Pura MD.    Please note that this dictation was created using voice recognition software. I have made every reasonable attempt to correct obvious errors, but I expect that there are errors of grammar and possibly content that I did not discover before finalizing the note.

## 2023-04-25 ENCOUNTER — TELEPHONE (OUTPATIENT)
Dept: MEDICAL GROUP | Facility: OTHER | Age: 63
End: 2023-04-25
Payer: COMMERCIAL

## 2023-04-26 NOTE — ASSESSMENT & PLAN NOTE
Chronic, stable  Will obtain hemoglobin R3c-mnyyliv and pending  Continue metformin 1000 mg twice daily

## 2023-04-26 NOTE — ASSESSMENT & PLAN NOTE
Acute on chronic, stable  Continue moisturizers as able  Refill clobetasol:  Clobetasol 0.05% cream to be applied twice daily  Consider dermatology referral

## 2023-04-26 NOTE — ASSESSMENT & PLAN NOTE
New problem to provider  We will obtain ultrasound head and neck, focused on the preauricular lymphadenopathy noted on the right

## 2023-05-02 ENCOUNTER — TELEPHONE (OUTPATIENT)
Dept: MEDICAL GROUP | Facility: OTHER | Age: 63
End: 2023-05-02

## 2023-05-02 NOTE — TELEPHONE ENCOUNTER
Lc called in following up on her referral request below as the previous referral to [facility] isn't viable because they no longer take her insurance.     She is requesting contact once the new referral is completed.

## 2023-05-02 NOTE — TELEPHONE ENCOUNTER
Please place referral for [facility] for Dr. Dennis Bueno, patient is requesting new referral because [facility] no longer excepts her insurance please advise thank you

## 2023-05-08 DIAGNOSIS — I25.10 CORONARY ARTERY DISEASE DUE TO LIPID RICH PLAQUE: Chronic | ICD-10-CM

## 2023-05-08 DIAGNOSIS — I25.83 CORONARY ARTERY DISEASE DUE TO LIPID RICH PLAQUE: Chronic | ICD-10-CM

## 2023-06-08 ENCOUNTER — OFFICE VISIT (OUTPATIENT)
Dept: MEDICAL GROUP | Facility: CLINIC | Age: 63
End: 2023-06-08
Payer: COMMERCIAL

## 2023-06-08 VITALS — BODY MASS INDEX: 26.09 KG/M2 | HEIGHT: 72 IN | WEIGHT: 192.6 LBS | RESPIRATION RATE: 16 BRPM

## 2023-06-08 DIAGNOSIS — J43.1 PANLOBULAR EMPHYSEMA (HCC): ICD-10-CM

## 2023-06-08 DIAGNOSIS — Z95.5 PRESENCE OF DRUG COATED STENT IN RIGHT CORONARY ARTERY: Chronic | ICD-10-CM

## 2023-06-08 DIAGNOSIS — G89.29 CHRONIC NONINTRACTABLE HEADACHE, UNSPECIFIED HEADACHE TYPE: ICD-10-CM

## 2023-06-08 DIAGNOSIS — J44.9 CHRONIC OBSTRUCTIVE PULMONARY DISEASE, UNSPECIFIED COPD TYPE (HCC): ICD-10-CM

## 2023-06-08 DIAGNOSIS — E11.9 TYPE 2 DIABETES MELLITUS WITHOUT COMPLICATION, WITHOUT LONG-TERM CURRENT USE OF INSULIN (HCC): ICD-10-CM

## 2023-06-08 DIAGNOSIS — R09.02 HYPOXEMIA: ICD-10-CM

## 2023-06-08 DIAGNOSIS — R51.9 CHRONIC NONINTRACTABLE HEADACHE, UNSPECIFIED HEADACHE TYPE: ICD-10-CM

## 2023-06-08 PROCEDURE — 99214 OFFICE O/P EST MOD 30 MIN: CPT | Performed by: FAMILY MEDICINE

## 2023-06-08 RX ORDER — IPRATROPIUM BROMIDE AND ALBUTEROL SULFATE 2.5; .5 MG/3ML; MG/3ML
SOLUTION RESPIRATORY (INHALATION)
Qty: 360 ML | Refills: 4 | Status: SHIPPED | OUTPATIENT
Start: 2023-06-08

## 2023-06-08 RX ORDER — PREDNISONE 20 MG/1
TABLET ORAL
Qty: 20 TABLET | Refills: 3 | Status: SHIPPED | OUTPATIENT
Start: 2023-06-08

## 2023-06-08 ASSESSMENT — PATIENT HEALTH QUESTIONNAIRE - PHQ9: CLINICAL INTERPRETATION OF PHQ2 SCORE: 0

## 2023-06-08 ASSESSMENT — FIBROSIS 4 INDEX: FIB4 SCORE: 0.92

## 2023-06-08 NOTE — ASSESSMENT & PLAN NOTE
Consider neoplasm, CVA, vascular abnormality, other causes.   MRI brain with contrast.   Acetaminophen prn headaches.   Neurology consultation as needed.

## 2023-06-09 PROBLEM — E78.5 DYSLIPIDEMIA: Chronic | Status: ACTIVE | Noted: 2023-01-11

## 2023-06-09 PROBLEM — R09.02 HYPOXEMIA: Status: ACTIVE | Noted: 2023-01-11

## 2023-06-09 PROBLEM — Z95.5 PRESENCE OF DRUG COATED STENT IN RIGHT CORONARY ARTERY: Chronic | Status: ACTIVE | Noted: 2023-01-11

## 2023-06-09 NOTE — PROGRESS NOTES
"[facility] MEDICINE     PATIENT ID:  NAME:  Lc Briggs  MRN:               [id number]  YOB: 1960    Date: 10:39 AM      CC:  Follow up labs, diabetes, lungs      HPI: Lc Briggs is a 62 y.o. female who presented with multiple comorbidities including past NSTEMI.     Problem   Chronic Nonintractable Headache    Has had daily severe headaches for over 3 months.  Having some vision problems and loses vision intermittently.  Denies nausea or vomiting with headaches.      Has cataracts but her vision \"loss\" seems different.      Dyslipidemia   Presence of drug coated stent in right coronary artery 2019    She is taking her medications.  Works at "[employer]  Sees Cardiology regularly.  Denies any angina.  Has atrial fibrillation as well.      Hypoxemia    Requires Oxygen at night and sometimes during the day; doesn't wear it to work.  No longer smoking.      Skin Lesion of Chest Wall    Formatting of this note might be different from the original.  Formatting of this note might be different from the original.  Has had some persistent dermatitis of right and left wrist areas on chronic basis.  Does itch.  Has used Clobetasol on and off without much effect.  Also, has a rash on lower legs that itches.  Has no lower leg swelling.  Does have some diabetic neuropathy.     Last Assessment & Plan:   Formatting of this note might be different from the original.  Encouraged moisturizers --- Cetaphil, Lubriderm, or similar product.   Clobetasol prn itching.   Dermatology follow-up in near future.    Last Assessment & Plan:   Formatting of this note might be different from the original.  Chronic skin lesions, without irregular colour/contour.  -dermatology referral     Asthma-Chronic Obstructive Pulmonary Disease Overlap Syndrome (Hcc)   Atrophic Fibrosis of Lung (Hcc)   Panlobular Emphysema (Hcc)    Has chronic need for Oxygen.  Usually doesn't wear it at work.  On Duonebs at home as " needed.  May need new Pulmonology referral due to her insurance.       Atrial Fibrillation With Rapid Ventricular Response (Hcc)   Diabetes Mellitus Without Complication (Hcc)    On oral medication     Benign Hypertension   Mass of Chest Wall   Hyperlipidemia       REVIEW OF SYSTEMS:   Ten systems reviewed and were negative except as noted in the HPI.                PROBLEM LIST  Patient Active Problem List   Diagnosis    Panlobular emphysema (HCC)    Overweight    Sleep apnea    Diabetes mellitus without complication (HCC)    Anxiety    Coronary artery disease due to lipid rich plaque - moderate RCA FFR negative 9/2015    Atrial fibrillation with rapid ventricular response (HCC)    Dyslipidemia    Presence of drug coated stent in right coronary artery 2019    Right arm pain    Skin lesion of chest wall    Eczema    Pain of right hand    Swollen lymph nodes    Chronic pain of right knee    Chronic nonintractable headache    Atrophic fibrosis of lung (HCC)    Benign hypertension    Hyperlipidemia    Hypoxemia    Mass of chest wall    Asthma-chronic obstructive pulmonary disease overlap syndrome (HCC)        PAST SURGICAL HISTORY:  Past Surgical History:   Procedure Laterality Date    CARDIAC CATH, RIGHT/LEFT HEART      OTHER CARDIAC SURGERY      WEDGE RESECTION LUNG         FAMILY HISTORY:  Family History   Problem Relation Age of Onset    Heart Disease Mother     Other Mother 72        pacemaker    Lung Disease Father         severe asthma    Alcohol/Drug Maternal Grandfather     Lung Disease Paternal Grandmother        SOCIAL HISTORY:   Social History     Tobacco Use    Smoking status: Former     Packs/day: 1.00     Years: 35.00     Pack years: 35.00     Types: Cigarettes     Quit date: 3/21/2013     Years since quitting: 10.2    Smokeless tobacco: Never    Tobacco comments:     quit 4 days ago, smoked less than 1ppd   Vaping Use    Vaping Use: Never used   Substance Use Topics    Alcohol use: No  "      ALLERGIES:  Allergies   Allergen Reactions    Cephalexin Anaphylaxis and Shortness of Breath     Rxn date: 2013.  Other reaction(s): SHORTNESS OF BREATH    Clindamycin Anaphylaxis     Rxn date: 2013.    Codeine Rash    Keflex Anaphylaxis    Lisinopril Palpitations     Other reaction(s): PALPITATIONS    Penicillins Hives, Rash and Itching     Rxn date: \"As a child.\"    Codeine Hives, Rash and Itching     Rxn date: \"As a child.\"    Rosuvastatin Myalgia       OUTPATIENT MEDICATIONS:    Current Outpatient Medications:     ipratropium-albuterol (DUONEB) 0.5-2.5 (3) MG/3ML nebulizer solution, use 1 vial via nebulizer every 4 hours as needed for shortness of breath, Disp: 360 mL, Rfl: 4    Blood Glucose Test Strips, Test strips order: Test strips for One Touch Verio meter. Sig: use BID and prn ssx high or low sugar #100 RF x 3, Disp: 100 Strip, Rfl: 11    predniSONE (DELTASONE) 20 MG Tab, One tablet by mouth two times a day for 5 days, and as needed for ling inflammation. ., Disp: 20 Tablet, Rfl: 3    aspirin (ASA) 81 MG Chew Tab chewable tablet, Chew 81 mg every day., Disp: , Rfl:     nitroglycerin (NITROSTAT) 0.4 MG SL Tab, Place 1 Tablet under the tongue as needed for Chest Pain (every 5 minutes up to 3 doses, if chest pain still present call 911)., Disp: 25 Tablet, Rfl: 1    simvastatin (ZOCOR) 20 MG Tab, simvastatin 20 mg tablet PO qhs, Disp: 90 Tablet, Rfl: 3    clobetasol (TEMOVATE) 0.05 % Cream, Apply 1 Each topically 2 times a day., Disp: 30 g, Rfl: 0    metformin (GLUCOPHAGE) 1000 MG tablet, Take 1 Tablet by mouth 2 times a day. Indications: Twice a day, Disp: 200 Tablet, Rfl: 3    atenolol (TENORMIN) 25 MG Tab, Take 1 Tablet by mouth every day., Disp: 90 Tablet, Rfl: 3    albuterol 108 (90 Base) MCG/ACT Aero Soln inhalation aerosol, Inhale 2 Puffs every four hours as needed for Shortness of Breath., Disp: 3 Each, Rfl: 3    ONETOUCH VERIO strip, , Disp: , Rfl:     Blood Glucose Monitoring Suppl Device, " Meter: Dispense Device of Insurance Preference. Sig. Use as directed for blood sugar monitoring. #1. NR., Disp: 1 Each, Rfl: 1    PHYSICAL EXAM:  Vitals:    06/08/23 0853   BP: (P) 116/66   BP Location: (P) Left arm   Patient Position: (P) Sitting   BP Cuff Size: (P) Adult   Pulse: (P) 86   Resp: 16   Temp: (P) 36.4 °C (97.6 °F)   TempSrc: (P) Temporal   SpO2: (P) 95%   Weight: 87.4 kg (192 lb 9.6 oz)   Height: 1.829 m (6')       General: Pt resting in NAD, cooperative   Skin:  Pink, warm and dry.  HEENT: NC/AT. EOMI.  Lungs:  Symmetrical.  CTAB, good air movement   Cardiovascular:  S1/S2 RRR   Abdomen:  Abdomen is soft, nontender  Extremities:  Full range of motion.  CNS:  Muscle tone is normal. No gross focal neurologic deficits      ASSESSMENT/PLAN:   62 y.o. female     Problem List Items Addressed This Visit       Presence of drug coated stent in right coronary artery 2019 (Chronic)     Continue to maximize medical therapy.   Continue daily statin therapy, aspirin therapy.          Chronic nonintractable headache     Consider neoplasm, CVA, vascular abnormality, other causes.   MRI brain with contrast.   Acetaminophen prn headaches.   Neurology consultation as needed.          Relevant Orders    MR-BRAIN-WITH    Diabetes mellitus without complication (HCC)    Relevant Medications    Blood Glucose Test Strips    Hypoxemia     Continue daily Oxygen therapy.   Use her inhalers as prescribed.          Panlobular emphysema (HCC)     DuoNeb refilled.   Pulmonology referral as available for her insurance.   Reviewed meds.          Relevant Medications    ipratropium-albuterol (DUONEB) 0.5-2.5 (3) MG/3ML nebulizer solution    predniSONE (DELTASONE) 20 MG Tab     Other Visit Diagnoses       Chronic obstructive pulmonary disease, unspecified COPD type (HCC)        Relevant Medications    ipratropium-albuterol (DUONEB) 0.5-2.5 (3) MG/3ML nebulizer solution    predniSONE (DELTASONE) 20 MG Tab            Dewayne Benton,  MD  [facility]

## 2023-07-10 ENCOUNTER — TELEPHONE (OUTPATIENT)
Dept: MEDICAL GROUP | Facility: OTHER | Age: 63
End: 2023-07-10
Payer: COMMERCIAL

## 2023-07-10 NOTE — TELEPHONE ENCOUNTER
Lc is requesting a Fecal Culture kit / stated it wasn't Cologard / As she doesn't want to quite do a Colonoscopy at this point because of insurance.    Her number [phone]

## 2023-07-11 DIAGNOSIS — Z12.11 SCREENING FOR COLON CANCER: ICD-10-CM

## 2023-07-18 ENCOUNTER — TELEPHONE (OUTPATIENT)
Dept: SLEEP MEDICINE | Facility: MEDICAL CENTER | Age: 63
End: 2023-07-18
Payer: COMMERCIAL

## 2023-07-18 NOTE — TELEPHONE ENCOUNTER
Per [facility], insurance needs a new updated O2 Rx in order for them to continue covering patient's O2.      [facility] Fax: [phone]

## 2023-07-31 NOTE — TELEPHONE ENCOUNTER
Called patient to inform her that [facility] needs an updated O2 order and recent 60 day chart notes for them to continue covering her oxygen. Also to offer her an appt. At our office.      Per patient, she can not schedule with us because [facility] does not accept her insurance and her new insurance is not active until October/late October.      Per patient, she try to find another provider that will take her insurance.

## 2023-08-07 ENCOUNTER — TELEPHONE (OUTPATIENT)
Dept: MEDICAL GROUP | Facility: OTHER | Age: 63
End: 2023-08-07
Payer: COMMERCIAL

## 2023-08-07 NOTE — TELEPHONE ENCOUNTER
Lc has been seeing Dr. Tucker at [facility]. He has transferred to [facility]. In order to remain his patient, she will need a new referral over to [facility] as she will be scheduled to have surgery in the near future and would like to keep him as her Orthopedic Surgeon.

## 2023-08-14 ENCOUNTER — TELEPHONE (OUTPATIENT)
Dept: MEDICAL GROUP | Facility: OTHER | Age: 63
End: 2023-08-14
Payer: COMMERCIAL

## 2023-08-14 NOTE — TELEPHONE ENCOUNTER
Lc has been seeing Dr. Foreman at [facility].     He has transferred over to [facility]. She wants to maintain him as her provider and [facility] will not schedule her without a new referral for her to them.    She is requesting a referral to [facility] so she can continue care.

## 2023-08-15 DIAGNOSIS — M79.641 PAIN OF RIGHT HAND: ICD-10-CM

## 2023-08-28 ENCOUNTER — TELEPHONE (OUTPATIENT)
Dept: MEDICAL GROUP | Facility: OTHER | Age: 63
End: 2023-08-28
Payer: COMMERCIAL

## 2023-08-28 DIAGNOSIS — H25.9 AGE-RELATED CATARACT OF BOTH EYES, UNSPECIFIED AGE-RELATED CATARACT TYPE: ICD-10-CM

## 2023-08-28 NOTE — TELEPHONE ENCOUNTER
Lc called in requesting another referral. She says she has some serious cataracts and would like the referral as soon as possible.    [facility]  Dr. Milan Nguyen  Their fax [phone]  Phone [phone]

## 2023-11-13 ENCOUNTER — TELEPHONE (OUTPATIENT)
Dept: MEDICAL GROUP | Facility: OTHER | Age: 63
End: 2023-11-13
Payer: COMMERCIAL

## 2023-11-13 NOTE — TELEPHONE ENCOUNTER
Lc called in stating she has lung congestion and knows she won't be able to get in before her appointment next week due to the doctors schedules.    She is requesting Levofloxacin 500 MG to be called in to [facility].    She would like a follow up phone call to let her know one way or another if this can be done.    ADDEND:    Dr. Neves advised that PT should go to Urgent Care to be assessed

## 2023-11-16 NOTE — TELEPHONE ENCOUNTER
Lc received antibiotic RX from pulmonologist, she is feeling much better, she will see you next week on tuesday

## 2023-11-21 ENCOUNTER — OFFICE VISIT (OUTPATIENT)
Dept: MEDICAL GROUP | Facility: OTHER | Age: 63
End: 2023-11-21
Payer: MEDICARE

## 2023-11-21 VITALS
SYSTOLIC BLOOD PRESSURE: 146 MMHG | BODY MASS INDEX: 26.48 KG/M2 | WEIGHT: 185 LBS | OXYGEN SATURATION: 92 % | TEMPERATURE: 98 F | HEIGHT: 70 IN | DIASTOLIC BLOOD PRESSURE: 70 MMHG | HEART RATE: 80 BPM

## 2023-11-21 DIAGNOSIS — E78.5 DYSLIPIDEMIA: Chronic | ICD-10-CM

## 2023-11-21 DIAGNOSIS — Z80.9 FAMILY HISTORY OF CANCER: ICD-10-CM

## 2023-11-21 DIAGNOSIS — E11.9 DIABETES MELLITUS WITHOUT COMPLICATION (HCC): ICD-10-CM

## 2023-11-21 DIAGNOSIS — I10 BENIGN HYPERTENSION: ICD-10-CM

## 2023-11-21 DIAGNOSIS — M79.601 RIGHT ARM PAIN: ICD-10-CM

## 2023-11-21 DIAGNOSIS — E11.9 TYPE 2 DIABETES MELLITUS WITHOUT COMPLICATION, WITHOUT LONG-TERM CURRENT USE OF INSULIN (HCC): ICD-10-CM

## 2023-11-21 PROCEDURE — 99215 OFFICE O/P EST HI 40 MIN: CPT | Mod: GC | Performed by: STUDENT IN AN ORGANIZED HEALTH CARE EDUCATION/TRAINING PROGRAM

## 2023-11-21 PROCEDURE — 3077F SYST BP >= 140 MM HG: CPT | Performed by: STUDENT IN AN ORGANIZED HEALTH CARE EDUCATION/TRAINING PROGRAM

## 2023-11-21 PROCEDURE — 3078F DIAST BP <80 MM HG: CPT | Performed by: STUDENT IN AN ORGANIZED HEALTH CARE EDUCATION/TRAINING PROGRAM

## 2023-11-21 RX ORDER — TIOTROPIUM BROMIDE AND OLODATEROL 3.124; 2.736 UG/1; UG/1
SPRAY, METERED RESPIRATORY (INHALATION)
COMMUNITY
Start: 2023-11-06

## 2023-11-21 ASSESSMENT — FIBROSIS 4 INDEX: FIB4 SCORE: 0.93

## 2023-11-21 NOTE — PROGRESS NOTES
" Subjective:   Lc Briggs is a 63 y.o. female here for the evaluation and management of Follow-Up (Medication refills/Mass on right arm )    HPI  63 year old female here for follow up for multiple concerns    #Genetic Testing  Reports brother just informed her that he underwent genetic testing and there is concern for increased risk of colon cancer due to MUTYH variant. Has never had a colonoscopy. Not interested in having widespread genetic testing, just for this specific gene.     #DM2  Needs refill for test strips. Interested in trying ozempic, particularly for weight loss.     #COPD  Uses oxygen daily, but can get by for hours without needing oxygen. Had recent illness last week, but is doing better.     #R olecranon bursitis  Chronic. Has tried drainage but failed. Dr. Harris will be removing it in January.     #R arm mass  Had MRI in January which was negative. Has tenderness over lateral aspect of biceps muscle belly. Dr. Harris will repeat MRI.     #Vaccination  Declines flu shot every year, \"not interested.\" Did get pneumococcal vaccine last year. Does not want mRNA vaccine due to underlying cardiac history. Also unsure about RSV vaccination.     ROS  Negative except as mentioned in HPI     Current Outpatient Medications   Medication Sig Dispense Refill    STIOLTO RESPIMAT 2.5-2.5 MCG/ACT Aero Soln       Blood Glucose Test Strips Test strips order: Test strips for One Touch Verio meter. Sig: use BID and prn ssx high or low sugar #100 RF x 3 100 Strip 11    ipratropium-albuterol (DUONEB) 0.5-2.5 (3) MG/3ML nebulizer solution use 1 vial via nebulizer every 4 hours as needed for shortness of breath 360 mL 4    aspirin (ASA) 81 MG Chew Tab chewable tablet Chew 81 mg every day.      nitroglycerin (NITROSTAT) 0.4 MG SL Tab Place 1 Tablet under the tongue as needed for Chest Pain (every 5 minutes up to 3 doses, if chest pain still present call 911). 25 Tablet 1    simvastatin (ZOCOR) 20 MG " "Tab simvastatin 20 mg tablet PO qhs 90 Tablet 3    clobetasol (TEMOVATE) 0.05 % Cream Apply 1 Each topically 2 times a day. 30 g 0    metformin (GLUCOPHAGE) 1000 MG tablet Take 1 Tablet by mouth 2 times a day. Indications: Twice a day 200 Tablet 3    atenolol (TENORMIN) 25 MG Tab Take 1 Tablet by mouth every day. 90 Tablet 3    albuterol 108 (90 Base) MCG/ACT Aero Soln inhalation aerosol Inhale 2 Puffs every four hours as needed for Shortness of Breath. 3 Each 3    ONETOUCH VERIO strip       predniSONE (DELTASONE) 20 MG Tab One tablet by mouth two times a day for 5 days, and as needed for ling inflammation. . (Patient not taking: Reported on 11/21/2023) 20 Tablet 3     No current facility-administered medications for this visit.     Allergies  Cephalexin, Clindamycin, Codeine, Keflex, Lisinopril, Penicillins, Codeine, and Rosuvastatin    Past Medical History:   Diagnosis Date    Arthritis current    knees, hands, elbows    ASTHMA 2008    recurring    Atrial fibrillation ([facility])     Breath shortness 02/25/2019    uses oxygen at 3 liters/min cont. \"[employer]erred\" oxygen company    Bronchitis     Cancer ([facility]) 1980s    Hodgkins lymphoma    COPD with acute exacerbation ([facility]) 2/25/2015    Coronary artery disease due to lipid rich plaque - moderate RCA FFR negative     Diabetes ([facility])     Dyslipidemia     High cholesterol 02/25/2019    Hypertension     Indigestion current    tx with OTC rolaids    MI (myocardial infarction) ([facility])     Oxygen dependent     PAF (paroxysmal atrial fibrillation) ([facility]) - s/p ablation 2015     Persistent atrial fibrillation ([facility]) 2/9/2016    Pneumonia     Pulmonary emphysema ([facility])     Pulmonary nodule     Restless leg syndrome     Sepsis ([facility]) 2/25/2015    Sleep apnea      Patient Active Problem List    Diagnosis Date Noted    Family history of cancer 11/21/2023    Chronic nonintractable headache 06/08/2023    Swollen lymph nodes 04/24/2023    Chronic pain of right knee 04/24/2023    Pain of right hand " 01/19/2023    Dyslipidemia 01/11/2023    Presence of drug coated stent in right coronary artery 2019 01/11/2023    Hypoxemia 01/11/2023    Eczema 11/29/2022    Right arm pain 10/25/2022    Skin lesion of chest wall 10/25/2022    Asthma-chronic obstructive pulmonary disease overlap syndrome 10/19/2016    Atrophic fibrosis of lung (HCC) 08/24/2016    Anxiety 03/21/2016    Coronary artery disease due to lipid rich plaque - moderate RCA FFR negative 9/2015     Panlobular emphysema (HCC) 02/09/2016    Overweight 02/09/2016    Sleep apnea 02/09/2016    Atrial fibrillation with rapid ventricular response (HCC) 11/18/2015    Diabetes mellitus without complication (HCC) 09/08/2015    Benign hypertension 08/26/2015    Mass of chest wall 08/26/2015    Hyperlipidemia 03/11/2015       Past Surgical History  Past Surgical History:   Procedure Laterality Date    CARDIAC CATH, RIGHT/LEFT HEART      OTHER CARDIAC SURGERY      WEDGE RESECTION LUNG         Social History     Socioeconomic History    Marital status:      Spouse name: Not on file    Number of children: Not on file    Years of education: Not on file    Highest education level: Not on file   Occupational History    Not on file   Tobacco Use    Smoking status: Former     Current packs/day: 0.00     Average packs/day: 1 pack/day for 35.0 years (35.0 ttl pk-yrs)     Types: Cigarettes     Start date: 3/21/1978     Quit date: 3/21/2013     Years since quitting: 10.6    Smokeless tobacco: Never    Tobacco comments:     quit 4 days ago, smoked less than 1ppd   Vaping Use    Vaping Use: Never used   Substance and Sexual Activity    Alcohol use: No    Drug use: No     Comment: former cocaine 'lots of it', former meth clean 9 years    Sexual activity: Not on file   Other Topics Concern    Not on file   Social History Narrative    Not on file     Social Determinants of Health     Financial Resource Strain: Not on file   Food Insecurity: Not on file   Transportation Needs: Not  "on file   Physical Activity: Not on file   Stress: Not on file   Social Connections: Not on file   Intimate Partner Violence: Not on file   Housing Stability: Not on file        Objective:     Vitals:    11/21/23 0809   BP: (!) 146/70   BP Location: Left arm   Patient Position: Sitting   Pulse: 80   Temp: 36.7 °C (98 °F)   TempSrc: Temporal   SpO2: 92%   Weight: 83.9 kg (185 lb)   Height: 1.778 m (5' 10\")     Body mass index is 26.54 kg/m².     Physical Exam  General: Well appearing, no acute distress  Cardiac: RRR. No murmurs, gallops, or rubs.   Lungs: Distant lung sounds. Normal tympany bilaterally.   Extremity: Large firm swelling over R olecranon. Mildly TTP over right biceps muscle belly.     Assessment and Plan:   Lc Briggs is a 63 y.o. female with a Follow-Up (Medication refills/Mass on right arm )     The following was discussed with the patient today.    1. Dyslipidemia  - Lipid Profile; Future    2. Diabetes mellitus without complication (HCC)  - CBC WITHOUT DIFFERENTIAL; Future  - HEMOGLOBIN A1C; Future  - Lipid Profile; Future  - MICROALBUMIN CREAT RATIO URINE; Future  - Comp Metabolic Panel; Future    3. Type 2 diabetes mellitus without complication, without long-term current use of insulin (HCC)  - Blood Glucose Test Strips; Test strips order: Test strips for One Touch Verio meter. Sig: use BID and prn ssx high or low sugar #100 RF x 3  Dispense: 100 Strip; Refill: 11    4. Right arm pain    5. Benign hypertension    6. Family history of cancer    Other orders  - STIOLTO RESPIMAT 2.5-2.5 MCG/ACT Aero Soln      Problem List Items Addressed This Visit       Dyslipidemia (Chronic)     Currently on simvastatin. Repeat labs ordered.          Relevant Orders    Lipid Profile    Diabetes mellitus without complication (HCC)     Last A1c 7.1, well controlled with metformin. Discussed side effect profile of ozempic, patient would not like to start medication. Repeat labs ordered today.       "    Relevant Medications    Blood Glucose Test Strips    Other Relevant Orders    CBC WITHOUT DIFFERENTIAL    HEMOGLOBIN A1C    Lipid Profile    MICROALBUMIN CREAT RATIO URINE    Comp Metabolic Panel    Right arm pain     Chronic olecranon bursa present, will be having it surgically removed with . R biceps pain possible muscle herniation. Low concern for lipoma or other soft tissue mass. Will be repeating MRI with Dr. Harris. Follow up as needed.          Benign hypertension     BP elevated in 140s today, reports bp at home 110s-120s on atenolol. Continue to monitor BP at home. If still not at goal at next visit, consider addition of ARB given DM2.          Family history of cancer     Discussed genetic testing with patient. Her brother provided information for specific lab doing MUTYH gene mutation testing. Discussed pursing this route as she does not want to have additional genetic testing. Recommended colonoscopy and GI referral, however patient declined. Follow up as needed when patient obtains results.           Other Visit Diagnoses       Type 2 diabetes mellitus without complication, without long-term current use of insulin (HCC)        Relevant Medications    Blood Glucose Test Strips          Kyle Muhammad DO  [employer] [facility] Fellow

## 2023-11-21 NOTE — ASSESSMENT & PLAN NOTE
BP elevated in 140s today, reports bp at home 110s-120s on atenolol. Continue to monitor BP at home. If still not at goal at next visit, consider addition of ARB given DM2.

## 2023-11-21 NOTE — ASSESSMENT & PLAN NOTE
Last A1c 7.1, well controlled with metformin. Discussed side effect profile of ozempic, patient would not like to start medication. Repeat labs ordered today.

## 2023-11-21 NOTE — ASSESSMENT & PLAN NOTE
Discussed genetic testing with patient. Her brother provided information for specific lab doing MUTYH gene mutation testing. Discussed pursing this route as she does not want to have additional genetic testing. Recommended colonoscopy and GI referral, however patient declined. Follow up as needed when patient obtains results.

## 2023-11-21 NOTE — ASSESSMENT & PLAN NOTE
Chronic olecranon bursa present, will be having it surgically removed with . R biceps pain possible muscle herniation. Low concern for lipoma or other soft tissue mass. Will be repeating MRI with Dr. Harris. Follow up as needed.

## 2023-12-03 DIAGNOSIS — I48.0 PAF (PAROXYSMAL ATRIAL FIBRILLATION) (HCC): Chronic | ICD-10-CM

## 2023-12-03 DIAGNOSIS — I25.83 CORONARY ARTERY DISEASE DUE TO LIPID RICH PLAQUE: Chronic | ICD-10-CM

## 2023-12-03 DIAGNOSIS — I25.10 CORONARY ARTERY DISEASE DUE TO LIPID RICH PLAQUE: Chronic | ICD-10-CM

## 2023-12-04 ENCOUNTER — TELEPHONE (OUTPATIENT)
Dept: MEDICAL GROUP | Facility: OTHER | Age: 63
End: 2023-12-04

## 2023-12-04 RX ORDER — ATENOLOL 25 MG/1
25 TABLET ORAL DAILY
Qty: 90 TABLET | Refills: 0 | Status: SHIPPED | OUTPATIENT
Start: 2023-12-04 | End: 2024-02-29 | Stop reason: SDUPTHER

## 2023-12-04 NOTE — TELEPHONE ENCOUNTER
Lc stated when she was in the last part of November, all the RX refill requests weren't put through. She is still requesting:      atenolol (TENORMIN) 25 MG TABS [[id number]]      ALBUTEROL INH [[id number]]      simvastatin (ZOCOR) 20 MG Tab [[id number]]      And she stated [employer] is not covering her meter, so she would like to have a Contour meter ordered and Test Strips for her Diabetes . . . One that [employer] will cover.

## 2023-12-22 DIAGNOSIS — J44.9 CHRONIC OBSTRUCTIVE PULMONARY DISEASE, UNSPECIFIED COPD TYPE (HCC): ICD-10-CM

## 2023-12-26 RX ORDER — SIMVASTATIN 20 MG
TABLET ORAL
Qty: 90 TABLET | Refills: 3 | Status: SHIPPED | OUTPATIENT
Start: 2023-12-26

## 2023-12-26 RX ORDER — ALBUTEROL SULFATE 90 UG/1
2 AEROSOL, METERED RESPIRATORY (INHALATION) EVERY 4 HOURS PRN
Qty: 3 EACH | Refills: 3 | Status: SHIPPED | OUTPATIENT
Start: 2023-12-26

## 2024-02-29 DIAGNOSIS — I48.0 PAF (PAROXYSMAL ATRIAL FIBRILLATION) (HCC): Chronic | ICD-10-CM

## 2024-02-29 DIAGNOSIS — I25.83 CORONARY ARTERY DISEASE DUE TO LIPID RICH PLAQUE: Chronic | ICD-10-CM

## 2024-02-29 DIAGNOSIS — I25.10 CORONARY ARTERY DISEASE DUE TO LIPID RICH PLAQUE: Chronic | ICD-10-CM

## 2024-03-01 RX ORDER — ATENOLOL 25 MG/1
25 TABLET ORAL DAILY
Qty: 90 TABLET | Refills: 0 | Status: SHIPPED | OUTPATIENT
Start: 2024-03-01

## 2024-03-01 NOTE — TELEPHONE ENCOUNTER
Received request via: Pharmacy    Was the patient seen in the last year in this department? Yes    Does the patient have an active prescription (recently filled or refills available) for medication(s) requested? No    Pharmacy Name: [facility]

## 2024-03-06 DIAGNOSIS — E11.9 TYPE 2 DIABETES MELLITUS WITHOUT COMPLICATION, WITHOUT LONG-TERM CURRENT USE OF INSULIN (HCC): ICD-10-CM

## 2024-03-07 NOTE — TELEPHONE ENCOUNTER
Received request via: Pharmacy    Was the patient seen in the last year in this department? Yes    Does the patient have an active prescription (recently filled or refills available) for medication(s) requested? No    Pharmacy Name: [facility]

## 2024-03-12 DIAGNOSIS — I25.10 CORONARY ARTERY DISEASE DUE TO LIPID RICH PLAQUE: ICD-10-CM

## 2024-03-12 DIAGNOSIS — I25.83 CORONARY ARTERY DISEASE DUE TO LIPID RICH PLAQUE: ICD-10-CM

## 2024-05-28 ENCOUNTER — HOSPITAL ENCOUNTER (OUTPATIENT)
Facility: MEDICAL CENTER | Age: 64
End: 2024-05-28
Attending: ORTHOPAEDIC SURGERY
Payer: MEDICARE

## 2024-08-27 DIAGNOSIS — J44.9 CHRONIC OBSTRUCTIVE PULMONARY DISEASE, UNSPECIFIED COPD TYPE (HCC): ICD-10-CM

## 2024-08-28 RX ORDER — IPRATROPIUM BROMIDE AND ALBUTEROL SULFATE 2.5; .5 MG/3ML; MG/3ML
SOLUTION RESPIRATORY (INHALATION)
Qty: 360 ML | Refills: 0 | Status: SHIPPED | OUTPATIENT
Start: 2024-08-28

## 2024-08-28 NOTE — TELEPHONE ENCOUNTER
Received request via: Pharmacy    Was the patient seen in the last year in this department? Yes    Does the patient have an active prescription (recently filled or refills available) for medication(s) requested? No    Pharmacy Name: [facility]    Does the patient have FPC Plus and need 100-day supply? (This applies to ALL medications) Patient does not have SCP

## 2024-09-13 DIAGNOSIS — J44.9 CHRONIC OBSTRUCTIVE PULMONARY DISEASE, UNSPECIFIED COPD TYPE (HCC): ICD-10-CM

## 2024-09-13 NOTE — TELEPHONE ENCOUNTER
Received request via: Pharmacy    Was the patient seen in the last year in this department? Yes    Does the patient have an active prescription (recently filled or refills available) for medication(s) requested? No    Pharmacy Name: [id number] - [place], [place] - [address]      Does the patient have retirement Plus and need 100-day supply? (This applies to ALL medications) Patient does not have SCP

## 2024-09-17 RX ORDER — IPRATROPIUM BROMIDE AND ALBUTEROL SULFATE 2.5; .5 MG/3ML; MG/3ML
SOLUTION RESPIRATORY (INHALATION)
Qty: 360 ML | Refills: 0 | Status: SHIPPED | OUTPATIENT
Start: 2024-09-17

## 2025-03-05 NOTE — TELEPHONE ENCOUNTER
Lc called after her appointment requesting referrals to:  [facility] / Dennis Bueno  Fax [phone]    Pulmonology - Her Insurance is [employer]   . . . So she is requesting whatever DrAngelica Will take her.  [phone]    She is needing it with Dr. Dennis Bueno instead of the Referral Dr. Benton put in because [facility] no longer takes her insurance.    Thank you.   Ambulatory with cane/crutches/walker